# Patient Record
Sex: FEMALE | Race: BLACK OR AFRICAN AMERICAN | NOT HISPANIC OR LATINO | URBAN - METROPOLITAN AREA
[De-identification: names, ages, dates, MRNs, and addresses within clinical notes are randomized per-mention and may not be internally consistent; named-entity substitution may affect disease eponyms.]

---

## 2021-08-02 ENCOUNTER — LAB VISIT (OUTPATIENT)
Dept: PRIMARY CARE CLINIC | Facility: CLINIC | Age: 66
End: 2021-08-02
Payer: OTHER GOVERNMENT

## 2021-08-02 DIAGNOSIS — R11.0 NAUSEA: ICD-10-CM

## 2021-08-02 DIAGNOSIS — R05.9 COUGH: ICD-10-CM

## 2021-08-02 PROCEDURE — U0003 INFECTIOUS AGENT DETECTION BY NUCLEIC ACID (DNA OR RNA); SEVERE ACUTE RESPIRATORY SYNDROME CORONAVIRUS 2 (SARS-COV-2) (CORONAVIRUS DISEASE [COVID-19]), AMPLIFIED PROBE TECHNIQUE, MAKING USE OF HIGH THROUGHPUT TECHNOLOGIES AS DESCRIBED BY CMS-2020-01-R: HCPCS | Performed by: INTERNAL MEDICINE

## 2021-08-02 PROCEDURE — U0005 INFEC AGEN DETEC AMPLI PROBE: HCPCS | Performed by: INTERNAL MEDICINE

## 2021-08-03 LAB
SARS-COV-2 RNA RESP QL NAA+PROBE: NOT DETECTED
SARS-COV-2- CYCLE NUMBER: -1

## 2025-07-02 ENCOUNTER — HOSPITAL ENCOUNTER (INPATIENT)
Facility: HOSPITAL | Age: 70
LOS: 7 days | Discharge: NURSING, REHAB OR LONG-TERM CARE | DRG: 871 | End: 2025-07-09
Attending: EMERGENCY MEDICINE | Admitting: STUDENT IN AN ORGANIZED HEALTH CARE EDUCATION/TRAINING PROGRAM
Payer: MEDICARE

## 2025-07-02 DIAGNOSIS — R07.9 CHEST PAIN: ICD-10-CM

## 2025-07-02 DIAGNOSIS — N30.90 CYSTITIS: ICD-10-CM

## 2025-07-02 DIAGNOSIS — A41.9 SEPTIC SHOCK: ICD-10-CM

## 2025-07-02 DIAGNOSIS — I50.9 HEART FAILURE: ICD-10-CM

## 2025-07-02 DIAGNOSIS — E87.20 LACTIC ACIDOSIS: ICD-10-CM

## 2025-07-02 DIAGNOSIS — R06.02 SHORTNESS OF BREATH: ICD-10-CM

## 2025-07-02 DIAGNOSIS — R41.82 ALTERED MENTAL STATUS, UNSPECIFIED ALTERED MENTAL STATUS TYPE: ICD-10-CM

## 2025-07-02 DIAGNOSIS — A41.9 SEVERE SEPSIS: Primary | ICD-10-CM

## 2025-07-02 DIAGNOSIS — R65.21 SEPTIC SHOCK: ICD-10-CM

## 2025-07-02 DIAGNOSIS — R00.0 SINUS TACHYCARDIA: ICD-10-CM

## 2025-07-02 DIAGNOSIS — T14.8XXA OPEN WOUND OF SKIN: ICD-10-CM

## 2025-07-02 DIAGNOSIS — R65.20 SEVERE SEPSIS: Primary | ICD-10-CM

## 2025-07-02 PROBLEM — Z93.1 PEG (PERCUTANEOUS ENDOSCOPIC GASTROSTOMY) STATUS: Status: ACTIVE | Noted: 2025-07-02

## 2025-07-02 PROBLEM — I50.42 CHRONIC COMBINED SYSTOLIC AND DIASTOLIC CHF (CONGESTIVE HEART FAILURE): Status: ACTIVE | Noted: 2025-07-02

## 2025-07-02 PROBLEM — Z86.73 HISTORY OF CVA (CEREBROVASCULAR ACCIDENT): Status: ACTIVE | Noted: 2025-07-02

## 2025-07-02 PROBLEM — I10 HYPERTENSION: Status: ACTIVE | Noted: 2025-04-29

## 2025-07-02 PROBLEM — Z86.711 HISTORY OF PULMONARY EMBOLISM: Status: ACTIVE | Noted: 2025-07-02

## 2025-07-02 PROBLEM — Z86.19 HISTORY OF CLOSTRIDIOIDES DIFFICILE COLITIS: Status: ACTIVE | Noted: 2025-07-02

## 2025-07-02 PROBLEM — R56.9 SEIZURES: Status: ACTIVE | Noted: 2025-07-02

## 2025-07-02 PROBLEM — R47.01 GLOBAL APHASIA: Status: ACTIVE | Noted: 2025-04-29

## 2025-07-02 PROBLEM — I69.351 HEMIPARESIS OF RIGHT DOMINANT SIDE AS LATE EFFECT OF CEREBRAL INFARCTION: Status: ACTIVE | Noted: 2025-04-29

## 2025-07-02 PROBLEM — Z71.89 ADVANCED CARE PLANNING/COUNSELING DISCUSSION: Status: ACTIVE | Noted: 2025-07-02

## 2025-07-02 LAB
ABSOLUTE EOSINOPHIL (OHS): 0.09 K/UL
ABSOLUTE MONOCYTE (OHS): 2.09 K/UL (ref 0.3–1)
ABSOLUTE NEUTROPHIL COUNT (OHS): 15.32 K/UL (ref 1.8–7.7)
ALBUMIN SERPL BCP-MCNC: 2.3 G/DL (ref 3.5–5.2)
ALLENS TEST: ABNORMAL
ALLENS TEST: NORMAL
ALP SERPL-CCNC: 100 UNIT/L (ref 40–150)
ALT SERPL W/O P-5'-P-CCNC: 35 UNIT/L (ref 10–44)
ANION GAP (OHS): 17 MMOL/L (ref 8–16)
AST SERPL-CCNC: 137 UNIT/L (ref 11–45)
BACTERIA #/AREA URNS AUTO: ABNORMAL /HPF
BASOPHILS # BLD AUTO: 0.15 K/UL
BASOPHILS NFR BLD AUTO: 0.7 %
BILIRUB SERPL-MCNC: 0.3 MG/DL (ref 0.1–1)
BILIRUB UR QL STRIP.AUTO: NEGATIVE
BUN SERPL-MCNC: 40 MG/DL (ref 8–23)
CALCIUM SERPL-MCNC: 11 MG/DL (ref 8.7–10.5)
CHLORIDE SERPL-SCNC: 97 MMOL/L (ref 95–110)
CLARITY UR: ABNORMAL
CO2 SERPL-SCNC: 25 MMOL/L (ref 23–29)
COLOR UR AUTO: YELLOW
CREAT SERPL-MCNC: 1.2 MG/DL (ref 0.5–1.4)
ERYTHROCYTE [DISTWIDTH] IN BLOOD BY AUTOMATED COUNT: 15.2 % (ref 11.5–14.5)
GFR SERPLBLD CREATININE-BSD FMLA CKD-EPI: 49 ML/MIN/1.73/M2
GLUCOSE SERPL-MCNC: 137 MG/DL (ref 70–110)
GLUCOSE UR QL STRIP: ABNORMAL
GRAN CASTS UR QL COMP ASSIST: 18 /LPF (ref ?–0)
HCT VFR BLD AUTO: 33.4 % (ref 37–48.5)
HGB BLD-MCNC: 10.6 GM/DL (ref 12–16)
HGB UR QL STRIP: ABNORMAL
HYALINE CASTS UR QL AUTO: 27 /LPF (ref 0–1)
IMM GRANULOCYTES # BLD AUTO: 0.21 K/UL (ref 0–0.04)
IMM GRANULOCYTES NFR BLD AUTO: 0.9 % (ref 0–0.5)
INFLUENZA A MOLECULAR (OHS): NEGATIVE
INFLUENZA B MOLECULAR (OHS): NEGATIVE
KETONES UR QL STRIP: NEGATIVE
LACTATE SERPL-SCNC: 2.4 MMOL/L (ref 0.5–2.2)
LACTATE SERPL-SCNC: 3.5 MMOL/L (ref 0.5–2.2)
LDH SERPL L TO P-CCNC: 4.08 MMOL/L (ref 0.5–2.2)
LDH SERPL L TO P-CCNC: NORMAL MMOL/L
LEUKOCYTE ESTERASE UR QL STRIP: ABNORMAL
LYMPHOCYTES # BLD AUTO: 4.73 K/UL (ref 1–4.8)
MCH RBC QN AUTO: 32.6 PG (ref 27–31)
MCHC RBC AUTO-ENTMCNC: 31.7 G/DL (ref 32–36)
MCV RBC AUTO: 103 FL (ref 82–98)
MICROSCOPIC COMMENT: ABNORMAL
NITRITE UR QL STRIP: NEGATIVE
NON-SQ EPI CELLS #/AREA URNS AUTO: 3 /HPF
NUCLEATED RBC (/100WBC) (OHS): 0 /100 WBC
PH UR STRIP: 6 [PH]
PLATELET # BLD AUTO: 571 K/UL (ref 150–450)
PMV BLD AUTO: 10.4 FL (ref 9.2–12.9)
POTASSIUM SERPL-SCNC: 4.8 MMOL/L (ref 3.5–5.1)
PROT SERPL-MCNC: 9.2 GM/DL (ref 6–8.4)
PROT UR QL STRIP: ABNORMAL
RBC # BLD AUTO: 3.25 M/UL (ref 4–5.4)
RBC #/AREA URNS AUTO: >100 /HPF (ref 0–4)
RELATIVE EOSINOPHIL (OHS): 0.4 %
RELATIVE LYMPHOCYTE (OHS): 20.9 % (ref 18–48)
RELATIVE MONOCYTE (OHS): 9.3 % (ref 4–15)
RELATIVE NEUTROPHIL (OHS): 67.8 % (ref 38–73)
SAMPLE: ABNORMAL
SAMPLE: NORMAL
SARS-COV-2 RDRP RESP QL NAA+PROBE: NEGATIVE
SITE: ABNORMAL
SITE: NORMAL
SODIUM SERPL-SCNC: 139 MMOL/L (ref 136–145)
SP GR UR STRIP: 1.02
SQUAMOUS #/AREA URNS AUTO: 2 /HPF
UROBILINOGEN UR STRIP-ACNC: NEGATIVE EU/DL
WBC # BLD AUTO: 22.59 K/UL (ref 3.9–12.7)
WBC #/AREA URNS AUTO: >100 /HPF (ref 0–5)
WBC CLUMPS UR QL AUTO: ABNORMAL
YEAST UR QL AUTO: ABNORMAL /HPF

## 2025-07-02 PROCEDURE — 96367 TX/PROPH/DG ADDL SEQ IV INF: CPT

## 2025-07-02 PROCEDURE — 25000003 PHARM REV CODE 250: Performed by: EMERGENCY MEDICINE

## 2025-07-02 PROCEDURE — 0202U NFCT DS 22 TRGT SARS-COV-2: CPT | Performed by: STUDENT IN AN ORGANIZED HEALTH CARE EDUCATION/TRAINING PROGRAM

## 2025-07-02 PROCEDURE — 96360 HYDRATION IV INFUSION INIT: CPT | Mod: 59

## 2025-07-02 PROCEDURE — 94640 AIRWAY INHALATION TREATMENT: CPT

## 2025-07-02 PROCEDURE — U0002 COVID-19 LAB TEST NON-CDC: HCPCS | Performed by: STUDENT IN AN ORGANIZED HEALTH CARE EDUCATION/TRAINING PROGRAM

## 2025-07-02 PROCEDURE — 25000242 PHARM REV CODE 250 ALT 637 W/ HCPCS: Performed by: EMERGENCY MEDICINE

## 2025-07-02 PROCEDURE — 25500020 PHARM REV CODE 255: Performed by: STUDENT IN AN ORGANIZED HEALTH CARE EDUCATION/TRAINING PROGRAM

## 2025-07-02 PROCEDURE — 93005 ELECTROCARDIOGRAM TRACING: CPT

## 2025-07-02 PROCEDURE — 99900035 HC TECH TIME PER 15 MIN (STAT)

## 2025-07-02 PROCEDURE — 83605 ASSAY OF LACTIC ACID: CPT

## 2025-07-02 PROCEDURE — 87449 NOS EACH ORGANISM AG IA: CPT | Performed by: PHYSICIAN ASSISTANT

## 2025-07-02 PROCEDURE — 27000207 HC ISOLATION

## 2025-07-02 PROCEDURE — 20000000 HC ICU ROOM

## 2025-07-02 PROCEDURE — 87502 INFLUENZA DNA AMP PROBE: CPT | Performed by: STUDENT IN AN ORGANIZED HEALTH CARE EDUCATION/TRAINING PROGRAM

## 2025-07-02 PROCEDURE — 99291 CRITICAL CARE FIRST HOUR: CPT

## 2025-07-02 PROCEDURE — 63600175 PHARM REV CODE 636 W HCPCS: Performed by: PHYSICIAN ASSISTANT

## 2025-07-02 PROCEDURE — 81001 URINALYSIS AUTO W/SCOPE: CPT | Performed by: EMERGENCY MEDICINE

## 2025-07-02 PROCEDURE — 25000003 PHARM REV CODE 250: Performed by: INTERNAL MEDICINE

## 2025-07-02 PROCEDURE — 94760 N-INVAS EAR/PLS OXIMETRY 1: CPT | Mod: XB

## 2025-07-02 PROCEDURE — 25000003 PHARM REV CODE 250: Performed by: STUDENT IN AN ORGANIZED HEALTH CARE EDUCATION/TRAINING PROGRAM

## 2025-07-02 PROCEDURE — 80053 COMPREHEN METABOLIC PANEL: CPT | Performed by: EMERGENCY MEDICINE

## 2025-07-02 PROCEDURE — 93010 ELECTROCARDIOGRAM REPORT: CPT | Mod: ,,, | Performed by: INTERNAL MEDICINE

## 2025-07-02 PROCEDURE — 83605 ASSAY OF LACTIC ACID: CPT | Performed by: EMERGENCY MEDICINE

## 2025-07-02 PROCEDURE — 36600 WITHDRAWAL OF ARTERIAL BLOOD: CPT

## 2025-07-02 PROCEDURE — 85025 COMPLETE CBC W/AUTO DIFF WBC: CPT | Performed by: EMERGENCY MEDICINE

## 2025-07-02 PROCEDURE — 63600175 PHARM REV CODE 636 W HCPCS: Performed by: EMERGENCY MEDICINE

## 2025-07-02 PROCEDURE — 96365 THER/PROPH/DIAG IV INF INIT: CPT

## 2025-07-02 PROCEDURE — 87040 BLOOD CULTURE FOR BACTERIA: CPT | Performed by: EMERGENCY MEDICINE

## 2025-07-02 PROCEDURE — 82803 BLOOD GASES ANY COMBINATION: CPT

## 2025-07-02 PROCEDURE — 87086 URINE CULTURE/COLONY COUNT: CPT | Performed by: EMERGENCY MEDICINE

## 2025-07-02 RX ORDER — VANCOMYCIN HYDROCHLORIDE 25 MG/ML
5 KIT ORAL 4 TIMES DAILY
Status: ON HOLD | COMMUNITY
Start: 2025-06-14 | End: 2025-07-08 | Stop reason: HOSPADM

## 2025-07-02 RX ORDER — IPRATROPIUM BROMIDE AND ALBUTEROL SULFATE 2.5; .5 MG/3ML; MG/3ML
3 SOLUTION RESPIRATORY (INHALATION)
Status: COMPLETED | OUTPATIENT
Start: 2025-07-02 | End: 2025-07-02

## 2025-07-02 RX ORDER — LANOLIN ALCOHOL/MO/W.PET/CERES
1000 CREAM (GRAM) TOPICAL
COMMUNITY
Start: 2025-05-21 | End: 2026-05-21

## 2025-07-02 RX ORDER — SODIUM CHLORIDE 0.9 % (FLUSH) 0.9 %
10 SYRINGE (ML) INJECTION
Status: DISCONTINUED | OUTPATIENT
Start: 2025-07-02 | End: 2025-07-09 | Stop reason: HOSPADM

## 2025-07-02 RX ORDER — FLUOXETINE 20 MG/5ML
40 SOLUTION ORAL
COMMUNITY
Start: 2025-05-21 | End: 2026-05-21

## 2025-07-02 RX ORDER — METOPROLOL TARTRATE 25 MG/1
50 TABLET, FILM COATED ORAL 2 TIMES DAILY
Status: ON HOLD | COMMUNITY
Start: 2025-06-14 | End: 2025-07-09

## 2025-07-02 RX ORDER — IPRATROPIUM BROMIDE 0.5 MG/2.5ML
0.5 SOLUTION RESPIRATORY (INHALATION) 4 TIMES DAILY
Status: ON HOLD | COMMUNITY
Start: 2025-05-20 | End: 2025-07-08 | Stop reason: HOSPADM

## 2025-07-02 RX ORDER — ENOXAPARIN SODIUM 100 MG/ML
1 INJECTION SUBCUTANEOUS EVERY 24 HOURS
Status: DISCONTINUED | OUTPATIENT
Start: 2025-07-02 | End: 2025-07-05

## 2025-07-02 RX ORDER — FUROSEMIDE 10 MG/ML
20 INJECTION INTRAMUSCULAR; INTRAVENOUS ONCE
Status: COMPLETED | OUTPATIENT
Start: 2025-07-02 | End: 2025-07-02

## 2025-07-02 RX ORDER — LEVETIRACETAM 500 MG/5ML
500 INJECTION, SOLUTION, CONCENTRATE INTRAVENOUS EVERY 12 HOURS
Status: DISCONTINUED | OUTPATIENT
Start: 2025-07-02 | End: 2025-07-09 | Stop reason: HOSPADM

## 2025-07-02 RX ORDER — IPRATROPIUM BROMIDE AND ALBUTEROL SULFATE 2.5; .5 MG/3ML; MG/3ML
3 SOLUTION RESPIRATORY (INHALATION) EVERY 4 HOURS PRN
Status: DISCONTINUED | OUTPATIENT
Start: 2025-07-02 | End: 2025-07-09 | Stop reason: HOSPADM

## 2025-07-02 RX ORDER — ACETAMINOPHEN 500 MG
1000 TABLET ORAL ONCE
Status: COMPLETED | OUTPATIENT
Start: 2025-07-02 | End: 2025-07-02

## 2025-07-02 RX ORDER — METRONIDAZOLE 500 MG/1
500 TABLET ORAL EVERY 8 HOURS
Status: DISCONTINUED | OUTPATIENT
Start: 2025-07-02 | End: 2025-07-03

## 2025-07-02 RX ORDER — ACETAMINOPHEN 500 MG
1000 TABLET ORAL ONCE
Status: DISCONTINUED | OUTPATIENT
Start: 2025-07-02 | End: 2025-07-02

## 2025-07-02 RX ORDER — CEFEPIME HYDROCHLORIDE 1 G/1
1 INJECTION, POWDER, FOR SOLUTION INTRAMUSCULAR; INTRAVENOUS
Status: DISCONTINUED | OUTPATIENT
Start: 2025-07-02 | End: 2025-07-03

## 2025-07-02 RX ORDER — SODIUM CHLORIDE 0.9 % (FLUSH) 0.9 %
10 SYRINGE (ML) INJECTION EVERY 8 HOURS
Status: DISCONTINUED | OUTPATIENT
Start: 2025-07-02 | End: 2025-07-02

## 2025-07-02 RX ORDER — TALC
6 POWDER (GRAM) TOPICAL NIGHTLY PRN
Status: DISCONTINUED | OUTPATIENT
Start: 2025-07-02 | End: 2025-07-02

## 2025-07-02 RX ORDER — LEVETIRACETAM 500 MG/1
500 TABLET ORAL 2 TIMES DAILY
COMMUNITY
Start: 2025-05-20 | End: 2026-05-20

## 2025-07-02 RX ORDER — TRAMADOL HYDROCHLORIDE 50 MG/1
50 TABLET, FILM COATED ORAL EVERY 6 HOURS PRN
Status: ON HOLD | COMMUNITY
Start: 2025-06-05 | End: 2025-07-08 | Stop reason: HOSPADM

## 2025-07-02 RX ORDER — METRONIDAZOLE 500 MG/1
500 TABLET ORAL EVERY 8 HOURS
Status: DISCONTINUED | OUTPATIENT
Start: 2025-07-02 | End: 2025-07-02

## 2025-07-02 RX ORDER — NALOXONE HCL 0.4 MG/ML
0.02 VIAL (ML) INJECTION
Status: DISCONTINUED | OUTPATIENT
Start: 2025-07-02 | End: 2025-07-09 | Stop reason: HOSPADM

## 2025-07-02 RX ADMIN — LEVETIRACETAM 500 MG: 100 INJECTION INTRAVENOUS at 09:07

## 2025-07-02 RX ADMIN — SODIUM CHLORIDE, POTASSIUM CHLORIDE, SODIUM LACTATE AND CALCIUM CHLORIDE 1170 ML: 600; 310; 30; 20 INJECTION, SOLUTION INTRAVENOUS at 09:07

## 2025-07-02 RX ADMIN — SODIUM CHLORIDE 1000 ML: 9 INJECTION, SOLUTION INTRAVENOUS at 12:07

## 2025-07-02 RX ADMIN — CEFEPIME 1 G: 1 INJECTION, POWDER, FOR SOLUTION INTRAMUSCULAR; INTRAVENOUS at 03:07

## 2025-07-02 RX ADMIN — IPRATROPIUM BROMIDE AND ALBUTEROL SULFATE 3 ML: 2.5; .5 SOLUTION RESPIRATORY (INHALATION) at 01:07

## 2025-07-02 RX ADMIN — ENOXAPARIN SODIUM 40 MG: 40 INJECTION SUBCUTANEOUS at 09:07

## 2025-07-02 RX ADMIN — METRONIDAZOLE 500 MG: 500 TABLET ORAL at 09:07

## 2025-07-02 RX ADMIN — FUROSEMIDE 20 MG: 10 INJECTION, SOLUTION INTRAMUSCULAR; INTRAVENOUS at 07:07

## 2025-07-02 RX ADMIN — PIPERACILLIN SODIUM AND TAZOBACTAM SODIUM 4.5 G: 4; .5 INJECTION, POWDER, FOR SOLUTION INTRAVENOUS at 09:07

## 2025-07-02 RX ADMIN — VANCOMYCIN HYDROCHLORIDE 750 MG: 750 INJECTION, POWDER, LYOPHILIZED, FOR SOLUTION INTRAVENOUS at 09:07

## 2025-07-02 RX ADMIN — IOHEXOL 70 ML: 350 INJECTION, SOLUTION INTRAVENOUS at 02:07

## 2025-07-02 RX ADMIN — ACETAMINOPHEN 1000 MG: 500 TABLET ORAL at 12:07

## 2025-07-02 NOTE — PLAN OF CARE
Case Management Assessment     PCP: Rut Rosenthal MD    Pharmacy: Nursing Home Orders    Patient Arrived From: [facility] ([facility])  Existing Help at Home: Currently at a nursing facility    Barriers to Discharge: none    Discharge Plan:    A. SNF   B. NH      Discharge planning assessment completed with assistance from patient's daughter, Ida Mancilla, via telephone.  When medically stable, patient will return to SNF.             07/02/25 1229   Discharge Assessment   Assessment Type Discharge Planning Assessment   Confirmed/corrected address, phone number and insurance Yes   Confirmed Demographics Correct on Facesheet   Source of Information health record   If unable to respond/provide information was family/caregiver contacted? Yes   Contact Name/Number Ida Mancilla, daughter,  [phone]   People in Home facility resident  (SNF (per daughter))   Name(s) of People in Home n/a   Facility Arrived From: [facility]. ([facility])   Do you expect to return to your current living situation? Yes   Do you have help at home or someone to help you manage your care at home? Yes   Who are your caregiver(s) and their phone number(s)? Nursing home staff  [phone]   Prior to hospitilization cognitive status: Unable to Assess   Current cognitive status: Unable to Assess  (Patient lying on strecher moaning.  Was not able to assist with discharge planning.)   Walking or Climbing Stairs Difficulty yes   Walking or Climbing Stairs ambulation difficulty, requires equipment   Dressing/Bathing Difficulty yes   Dressing/Bathing bathing difficulty, assistance 1 person   Home Accessibility wheelchair accessible  (NH)   Equipment Currently Used at Home wheelchair   Readmission within 30 days? No   Patient currently being followed by outpatient case management? No   Do you currently have service(s) that help you manage your care at home? No   Do you take prescription medications? Yes   Do you have prescription coverage?    (Unknown)   Who is going to help you get home at discharge? Ambulance or wheelchair van (depending on patient's postural status at discharge).   How do you get to doctors appointments? other (see comments)   Are you on dialysis? No   Do you take coumadin? No   Discharge Plan A Skilled Nursing Facility   Discharge Plan B Return to Nursing Home   DME Needed Upon Discharge  none   Discharge Plan discussed with: Adult children   Transition of Care Barriers None

## 2025-07-02 NOTE — ASSESSMENT & PLAN NOTE
"Patient has Combined Systolic and Diastolic heart failure that is Chronic. On presentation their CHF was well compensated. Most recent BNP and echo results are listed below.  No results for input(s): "BNP" in the last 72 hours.  Latest ECHO  No results found for this or any previous visit.    Current Heart Failure Medications  , Daily, Per G Tube  , 2 times daily, FEEDING TUBE    Plan  - Monitor strict I&Os and daily weights.    - Place on telemetry  - Low sodium diet  - Place on fluid restriction of 1.5 L.   - Cardiology has not been consulted  - The patient's volume status is at their baseline  - home metoprolol and entresto held for sepsis.     Echo in care everywhere with EF of 35 to 40% and grade 1 DD.           "

## 2025-07-02 NOTE — SUBJECTIVE & OBJECTIVE
Past Medical History:   Diagnosis Date    Anticoagulant long-term use     CHF (congestive heart failure)     Seizures     Stroke        No past surgical history on file.    Review of patient's allergies indicates:  No Known Allergies    No current facility-administered medications on file prior to encounter.     Current Outpatient Medications on File Prior to Encounter   Medication Sig    apixaban (ELIQUIS) 5 mg Tab 5 mg by Per G Tube route 2 (two) times daily.    cyanocobalamin (VITAMIN B-12) 1000 MCG tablet 1,000 mcg by FEEDING TUBE route.    empagliflozin (JARDIANCE) 10 mg tablet 10 mg by Per G Tube route once daily.    FLUoxetine (PROZAC) 20 mg/5 mL (4 mg/mL) solution 40 mg by FEEDING TUBE route.    ipratropium (ATROVENT) 0.02 % nebulizer solution Inhale 0.5 mg into the lungs 4 (four) times daily.    levETIRAcetam (KEPPRA) 500 MG Tab 500 mg by FEEDING TUBE route 2 (two) times daily.    metoprolol tartrate (LOPRESSOR) 25 MG tablet 50 mg by FEEDING TUBE route 2 (two) times daily.    sacubitriL-valsartan (ENTRESTO) 24-26 mg per tablet 1 tablet by FEEDING TUBE route 2 (two) times daily.    traMADoL (ULTRAM) 50 mg tablet Take 50 mg by mouth every 6 (six) hours as needed.    vancomycin (FIRVANQ) 25 mg/mL SolR 5 mLs by Per G Tube route 4 (four) times daily.     Family History    None       Tobacco Use    Smoking status: Not on file    Smokeless tobacco: Not on file   Substance and Sexual Activity    Alcohol use: Not on file    Drug use: Not on file    Sexual activity: Not on file     Review of Systems   Unable to perform ROS: Patient nonverbal     Objective:     Vital Signs (Most Recent):  Temp: 100 °F (37.8 °C) (07/02/25 1258)  Pulse: (!) 137 (07/02/25 1400)  Resp: (!) 24 (07/02/25 1320)  BP: (!) 116/53 (07/02/25 1400)  SpO2: 96 % (07/02/25 1400) Vital Signs (24h Range):  Temp:  [98.6 °F (37 °C)-100 °F (37.8 °C)] 100 °F (37.8 °C)  Pulse:  [100-142] 137  Resp:  [24-75] 24  SpO2:  [96 %-100 %] 96 %  BP: ()/(40-70)  116/53     Weight: 39 kg (85 lb 15.7 oz)  Body mass index is 16.79 kg/m².     Physical Exam  Vitals and nursing note reviewed.   Constitutional:       General: She is not in acute distress.     Appearance: She is well-developed.   HENT:      Head: Normocephalic and atraumatic.   Eyes:      General:         Right eye: No discharge.         Left eye: No discharge.      Conjunctiva/sclera: Conjunctivae normal.   Neck:      Thyroid: No thyromegaly.   Cardiovascular:      Rate and Rhythm: Normal rate and regular rhythm.      Heart sounds: No murmur heard.  Pulmonary:      Effort: Pulmonary effort is normal. No respiratory distress.      Breath sounds: Normal breath sounds.      Comments: On nasal cannula  Abdominal:      General: Bowel sounds are normal. There is no distension.      Palpations: Abdomen is soft. There is no mass.      Tenderness: There is no abdominal tenderness.   Musculoskeletal:         General: No deformity.      Cervical back: Normal range of motion.   Skin:     General: Skin is warm and dry.   Neurological:      Mental Status: She is alert.      Comments: Right sided hemiparesis. Non-verbal. Does not track. Spontaneously moves fingers of left hand.    Psychiatric:      Comments: Unable to assess                Significant Labs: CBC:   Recent Labs   Lab 07/02/25  0852   WBC 22.59*   HGB 10.6*   HCT 33.4*   *     CMP:   Recent Labs   Lab 07/02/25  0852      K 4.8   CL 97   CO2 25   *   BUN 40*   CREATININE 1.2   CALCIUM 11.0*   PROT 9.2*   ALBUMIN 2.3*   BILITOT 0.3   ALKPHOS 100   *   ALT 35   ANIONGAP 17*     Lactic Acid:   Recent Labs   Lab 07/02/25  0852 07/02/25  1133   LACTATE 3.5* 2.4*     Urine Studies:   Recent Labs   Lab 07/02/25  1044   COLORU Yellow   APPEARANCEUA Cloudy*   PHUR 6.0   SPECGRAV 1.025   PROTEINUA 1+*   GLUCUA 3+*   BILIRUBINUA Negative   OCCULTUA 3+*   NITRITE Negative   UROBILINOGEN Negative   LEUKOCYTESUR 3+*   RBCUA >100*   WBCUA >100*    BACTERIA Moderate*   HYALINECASTS 27*       Significant Imaging:   Imaging Results              X-Ray Chest 1 View (Final result)  Result time 07/02/25 13:47:16      Final result by Jose Alston MD (07/02/25 13:47:16)                   Impression:      As above      Electronically signed by: Jose Alsotn MD  Date:    07/02/2025  Time:    13:47               Narrative:    EXAMINATION:  XR CHEST 1 VIEW    CLINICAL HISTORY:  Shortness of breath    TECHNIQUE:  Single frontal view of the chest was performed.    COMPARISON:  07/02/2025    FINDINGS:  There is no pneumothorax.  Coarse interstitial attenuation is more conspicuous than on the previous exam, developing edema or infection is a consideration.  There is no pneumothorax or other significant detrimental change since the previous exam.                                       X-Ray Chest AP Portable (Final result)  Result time 07/02/25 11:03:14      Final result by Rakesh Mistry MD (07/02/25 11:03:14)                   Impression:      No acute radiographic findings in the chest on this single view.      Electronically signed by: Rakesh Mistry MD  Date:    07/02/2025  Time:    11:03               Narrative:    EXAMINATION:  XR CHEST AP PORTABLE    CLINICAL HISTORY:  Sepsis;    TECHNIQUE:  Single frontal view of the chest was performed.    COMPARISON:  None.    FINDINGS:  Cardiac monitoring leads project over the bilateral hemithoraces.  Mediastinal structures are midline. Hilar contours are unremarkable.  Cardiac silhouette is normal in size. Lung volumes are normal and symmetric.  No consolidation.  No pneumothorax or pleural effusion.  No free air beneath the diaphragm. No acute osseous abnormalities.  Degenerative changes of the spine and shoulders.

## 2025-07-02 NOTE — H&P
[facility]  [facility] Medicine  History & Physical    Patient Name: Lynda Mackenzie  MRN: [id number]  Patient Class: IP- Inpatient  Admission Date: 7/2/2025  Attending Physician: Tanner Colmenares MD   Primary Care Provider: Rut Rosenthal MD         Patient information was obtained from patient, past medical records, and ER records.     Subjective:     Principal Problem:Severe sepsis    Chief Complaint:   Chief Complaint   Patient presents with    Shortness of Breath     Pt from [facility] [place] with reports SOB starting this morning. Patient on NRB upon arrival. Sats were initially 96% but was tachypneic and dropped to 80% for EMS. Denies hx COPD. + HX PE. Pt with mild increased WOB at arrival.        HPI: Lynda Mackenzie 69 y.o. female with CHF, history of PE on eliquis, seizure disorder on keppra, previous CVA with residual aphasia and right sided hemiparesis, PEG status presents to the hospital from [facility] after witnessed hypoxia and increased work of breathing. The patient is unable to provide any history due to baseline aphasia.     Discussed with nurse from [facility] she reports this morning patient's respiratory rate was elevated near 30 and she was hypoxic requiring supplemental oxygen causing activation of EMS.  At baseline she is not on supplemental oxygen.  She is nonverbal at baseline.  She has been tolerating her tube feeds at 40 cc an hour continuous with 140cc every 4 hours FWF.  She completed her vancomycin from previous hospitalization for C diff colitis.  There has been no diarrhea at the nursing home.  There have been no witnessed episodes of emesis or seizures.  She has not had a fever.  She receives her Keppra and Eliquis via PEG.    Discussed with patient's daughter KIRK he will confirms DNR status.  She has baseline aphasia and right-sided hemiparesis from previous CVA. She is not on home oxygen.     In the ED, tachycardic near 140 white blood cell count 22 initial lactic  acid 3.5 repeat 2.4 UA with white blood cells bacteria initial chest x-ray without acute process, repeat after 1.7 L of IVF with increased conspicuous lung markings.        Past Medical History:   Diagnosis Date    Anticoagulant long-term use     CHF (congestive heart failure)     Seizures     Stroke        No past surgical history on file.    Review of patient's allergies indicates:  No Known Allergies    No current facility-administered medications on file prior to encounter.     Current Outpatient Medications on File Prior to Encounter   Medication Sig    apixaban (ELIQUIS) 5 mg Tab 5 mg by Per G Tube route 2 (two) times daily.    cyanocobalamin (VITAMIN B-12) 1000 MCG tablet 1,000 mcg by FEEDING TUBE route.    empagliflozin (JARDIANCE) 10 mg tablet 10 mg by Per G Tube route once daily.    FLUoxetine (PROZAC) 20 mg/5 mL (4 mg/mL) solution 40 mg by FEEDING TUBE route.    ipratropium (ATROVENT) 0.02 % nebulizer solution Inhale 0.5 mg into the lungs 4 (four) times daily.    levETIRAcetam (KEPPRA) 500 MG Tab 500 mg by FEEDING TUBE route 2 (two) times daily.    metoprolol tartrate (LOPRESSOR) 25 MG tablet 50 mg by FEEDING TUBE route 2 (two) times daily.    sacubitriL-valsartan (ENTRESTO) 24-26 mg per tablet 1 tablet by FEEDING TUBE route 2 (two) times daily.    traMADoL (ULTRAM) 50 mg tablet Take 50 mg by mouth every 6 (six) hours as needed.    vancomycin (FIRVANQ) 25 mg/mL SolR 5 mLs by Per G Tube route 4 (four) times daily.     Family History    None       Tobacco Use    Smoking status: Not on file    Smokeless tobacco: Not on file   Substance and Sexual Activity    Alcohol use: Not on file    Drug use: Not on file    Sexual activity: Not on file     Review of Systems   Unable to perform ROS: Patient nonverbal     Objective:     Vital Signs (Most Recent):  Temp: 100 °F (37.8 °C) (07/02/25 1258)  Pulse: (!) 137 (07/02/25 1400)  Resp: (!) 24 (07/02/25 1320)  BP: (!) 116/53 (07/02/25 1400)  SpO2: 96 % (07/02/25 1400)  Vital Signs (24h Range):  Temp:  [98.6 °F (37 °C)-100 °F (37.8 °C)] 100 °F (37.8 °C)  Pulse:  [100-142] 137  Resp:  [24-75] 24  SpO2:  [96 %-100 %] 96 %  BP: ()/(40-70) 116/53     Weight: 39 kg (85 lb 15.7 oz)  Body mass index is 16.79 kg/m².     Physical Exam  Vitals and nursing note reviewed.   Constitutional:       General: She is not in acute distress.     Appearance: She is well-developed.   HENT:      Head: Normocephalic and atraumatic.   Eyes:      General:         Right eye: No discharge.         Left eye: No discharge.      Conjunctiva/sclera: Conjunctivae normal.   Neck:      Thyroid: No thyromegaly.   Cardiovascular:      Rate and Rhythm: Normal rate and regular rhythm.      Heart sounds: No murmur heard.  Pulmonary:      Effort: Pulmonary effort is normal. No respiratory distress.      Breath sounds: Normal breath sounds.      Comments: On nasal cannula  Abdominal:      General: Bowel sounds are normal. There is no distension.      Palpations: Abdomen is soft. There is no mass.      Tenderness: There is no abdominal tenderness.   Musculoskeletal:         General: No deformity.      Cervical back: Normal range of motion.   Skin:     General: Skin is warm and dry.   Neurological:      Mental Status: She is alert.      Comments: Right sided hemiparesis. Non-verbal. Does not track. Spontaneously moves fingers of left hand.    Psychiatric:      Comments: Unable to assess                Significant Labs: CBC:   Recent Labs   Lab 07/02/25  0852   WBC 22.59*   HGB 10.6*   HCT 33.4*   *     CMP:   Recent Labs   Lab 07/02/25  0852      K 4.8   CL 97   CO2 25   *   BUN 40*   CREATININE 1.2   CALCIUM 11.0*   PROT 9.2*   ALBUMIN 2.3*   BILITOT 0.3   ALKPHOS 100   *   ALT 35   ANIONGAP 17*     Lactic Acid:   Recent Labs   Lab 07/02/25  0852 07/02/25  1133   LACTATE 3.5* 2.4*     Urine Studies:   Recent Labs   Lab 07/02/25  1044   COLORU Yellow   APPEARANCEUA Cloudy*   PHUR 6.0    SPECGRAV 1.025   PROTEINUA 1+*   GLUCUA 3+*   BILIRUBINUA Negative   OCCULTUA 3+*   NITRITE Negative   UROBILINOGEN Negative   LEUKOCYTESUR 3+*   RBCUA >100*   WBCUA >100*   BACTERIA Moderate*   HYALINECASTS 27*       Significant Imaging:   Imaging Results              X-Ray Chest 1 View (Final result)  Result time 07/02/25 13:47:16      Final result by Jose Alston MD (07/02/25 13:47:16)                   Impression:      As above      Electronically signed by: Jose Alston MD  Date:    07/02/2025  Time:    13:47               Narrative:    EXAMINATION:  XR CHEST 1 VIEW    CLINICAL HISTORY:  Shortness of breath    TECHNIQUE:  Single frontal view of the chest was performed.    COMPARISON:  07/02/2025    FINDINGS:  There is no pneumothorax.  Coarse interstitial attenuation is more conspicuous than on the previous exam, developing edema or infection is a consideration.  There is no pneumothorax or other significant detrimental change since the previous exam.                                       X-Ray Chest AP Portable (Final result)  Result time 07/02/25 11:03:14      Final result by Rakesh Mistry MD (07/02/25 11:03:14)                   Impression:      No acute radiographic findings in the chest on this single view.      Electronically signed by: Rakesh Mistry MD  Date:    07/02/2025  Time:    11:03               Narrative:    EXAMINATION:  XR CHEST AP PORTABLE    CLINICAL HISTORY:  Sepsis;    TECHNIQUE:  Single frontal view of the chest was performed.    COMPARISON:  None.    FINDINGS:  Cardiac monitoring leads project over the bilateral hemithoraces.  Mediastinal structures are midline. Hilar contours are unremarkable.  Cardiac silhouette is normal in size. Lung volumes are normal and symmetric.  No consolidation.  No pneumothorax or pleural effusion.  No free air beneath the diaphragm. No acute osseous abnormalities.  Degenerative changes of the spine and shoulders.                            "           Assessment/Plan:     Assessment & Plan  Severe sepsis  Presents with hypoxia, tachycardia, and WBC of 22 from [facility]. Remains tachycardic in the ED despite IVF, and remains on NC. With sacral decubitus without surrounding cellulitis or drainage. Possible source includes urine. Was tachycardic persistently at [facility] during last hospitalization.  Continue cefepime/vanc/flagyl  Blood/urine cultures pending  Further IVF deferred given history of CHF and repeat x-ray with more conspicuous lung markings  Check CTA chest given hypoxia, tachycardia, and history of PE-GFR 49 above cut off for contrast, discussed with CT        Patient has sepsis with Acute respiratory failure secondary to Urinary Tract Infection. A review of systems was completed. Patient's sepsis is stable    Current Antibiotics    , 4 times daily, Per G Tube  ceFEPIme injection 1 g, Every 12 hours (non-standard times), Intravenous  vancomycin - pharmacy to dose, pharmacy to manage frequency, Intravenous  metroNIDAZOLE tablet 500 mg, Every 8 hours, Oral    Lactate  Recent Labs   Lab 07/02/25  0852 07/02/25  0901 07/02/25  0906 07/02/25  1133   LACTATE 3.5*  --   --  2.4*   POCLAC  --  UNAVAILABLE 4.08*  --      Culture Data  Blood Cultures No results found for: "CULTBLD"   Urine Culture No results found for: "LABURIN"   mSOFA  MSOFA Total  Min: 0   Min taken time: 07/02/25 0915  Max: 3   Max taken time: 07/02/25 1416    Plan  - Antibiotics as listed above  - Fluid resuscitation as follows:Contraindicated- Fluid bolus is contraindicated in this patient due to Congestive Heart Failure   - Trend lactate to resolution  - Follow up culture data  - Vasopressors were initiated to maintain MAP >65 due to persistent hypotension after appropriate fluid administration  - The following services were consulted:Palliative Medicine.    Global aphasia  Non-verbal at baseline. And only eats via tube feedings.     Hemiparesis of right dominant side as late effect " "of cerebral infarction  Per care everywhere. Aphasic at baseline per NH  Confirmed with daughter residual right sided hemiparesis from previous CVA  Hypertension  Patient's blood pressure range in the last 24 hours was: BP  Min: 90/40  Max: 143/70.The patient's inpatient anti-hypertensive regimen is listed below:  Current Antihypertensives  , 2 times daily, FEEDING TUBE    Plan  - BP is controlled, no changes needed to their regimen  - home metoprolol/entresto held for cocnern for sepsis  History of CVA (cerebrovascular accident)  With residual non-verbal status and PEG dependent per discussion with NH    Chronic combined systolic and diastolic CHF (congestive heart failure)  Patient has Combined Systolic and Diastolic heart failure that is Chronic. On presentation their CHF was well compensated. Most recent BNP and echo results are listed below.  No results for input(s): "BNP" in the last 72 hours.  Latest ECHO  No results found for this or any previous visit.    Current Heart Failure Medications  , Daily, Per G Tube  , 2 times daily, FEEDING TUBE    Plan  - Monitor strict I&Os and daily weights.    - Place on telemetry  - Low sodium diet  - Place on fluid restriction of 1.5 L.   - Cardiology has not been consulted  - The patient's volume status is at their baseline  - home metoprolol and entresto held for sepsis.     Echo in care everywhere with EF of 35 to 40% and grade 1 DD.           PEG (percutaneous endoscopic gastrostomy) status  Patient noted to have a percutaneous endoscopic gastrostomy tube in place. I have personally inspected the tube.Tube was placed prior to this admission There are no signs of drainage or infection around the site. The tube is patent. Medications have converted to liquid form if available.  Routine care to be done by wound care and nursing staff.       On Jevity 1.5 continuous per [facility] at 40 cc/hr with 120 FWF every 4. Nutrition consulted  Will hold resuming tube feedings today " given receieved almost 2L of IVF in ED with CHF and repeat x-ray with increased lung markings    Advanced care planning/counseling discussion  Advance Care Planning     Date: 07/02/2025    Code Status  In light of the patients advanced and life limiting illness,I engaged the the family and healthcare power of   in a voluntary conversation about the patient's preferences for care  at the very end of life. The patient wishes to have a natural, peaceful death.  Along those lines, the patient does not wish to have CPR or other invasive treatments performed when her heart and/or breathing stops. I communicated to the family and healthcare power of   that a DNR order would be placed in her medical record to reflect this preference.    A total of 16 min was spent on advance care planning, goals of care discussion, emotional support, formulating and communicating prognosis and exploring burden/benefit of various approaches of treatment. This discussion occurred on a fully voluntary basis with the verbal consent of the patient and/or family.     Discussed with KIRK who is also director of nursing at NH where patient resides. Confirmed DNR status. Palliative care consulted       Seizures  Convert home keppra to IV while hospitalized  History of Clostridioides difficile colitis  Diagnosed 6/2025. Confirmed with NH completed, vanc and no diarrhea prior to arrival  History of pulmonary embolism  Will convert to lovenox while hospitalized. See above. Repeat CTA given  hypoxia tachycardia persistent despite IVF. Confirmed with NH receiving eliquis  VTE Risk Mitigation (From admission, onward)           Ordered     IP VTE HIGH RISK PATIENT  Once         07/02/25 1344     Place sequential compression device  Until discontinued         07/02/25 1344     Place DEBBY hose  Until discontinued         07/02/25 1344                    Discussed with ED physician.    VTE: lovenox  Code: DNR  Diet: NPO  Dispo: pending  cultures  As clarification, on 7/2/2025, patient should be admitted to inpatient services under my care in collaboration with Tanner Colmenares MD. Carlos Nichols PA-C      Critical care time spent on the evaluation and treatment of severe organ dysfunction, review of pertinent labs and imaging studies, discussions with consulting providers and discussions with patient/family: 60 minutes.      Pharmacokinetic Initial Assessment: IV Vancomycin    Assessment/Plan:    Initiate intravenous vancomycin with loading dose of 750 mg once with subsequent doses when random concentrations are less than 20 mcg/mL  Desired empiric serum trough concentration is 10 to 20 mcg/mL  Draw vancomycin random level on 7/3/25 at 0400.  Pharmacy will continue to follow and monitor vancomycin.      Please contact pharmacy at extension [phone] with any questions regarding this assessment.     Thank you for the consult,   Ezra Moser       Patient brief summary:  Lynda Mackenzie is a 69 y.o. female initiated on antimicrobial therapy with IV Vancomycin for treatment of suspected bacteremia    Drug Allergies:   Review of patient's allergies indicates:  No Known Allergies    Actual Body Weight:   39 kg    Renal Function:   Estimated Creatinine Clearance: 27.2 mL/min (based on SCr of 1.2 mg/dL).,     Dialysis Method (if applicable):  N/A    CBC (last 72 hours):  Recent Labs   Lab Result Units 07/02/25  0852   WBC K/uL 22.59*   HGB gm/dL 10.6*   HCT % 33.4*   Platelet Count K/uL 571*   Lymph % % 20.9   Mono % % 9.3   Eos % % 0.4   Basophil % % 0.7       Metabolic Panel (last 72 hours):  Recent Labs   Lab Result Units 07/02/25  0852 07/02/25  1044   Sodium mmol/L 139  --    Potassium mmol/L 4.8  --    Chloride mmol/L 97  --    CO2 mmol/L 25  --    Glucose mg/dL 137*  --    Glucose, UA   --  3+*   BUN mg/dL 40*  --    Creatinine mg/dL 1.2  --    Albumin g/dL 2.3*  --    Bilirubin Total mg/dL 0.3  --    ALP unit/L 100  --    AST unit/L 137*  --   "  ALT unit/L 35  --        Drug levels (last 3 results):  No results for input(s): "VANCOMYCINRA", "VANCORANDOM", "VANCOMYCINPE", "VANCOPEAK", "VANCOMYCINTR", "VANCOTROUGH" in the last 72 hours.    Microbiologic Results:  Microbiology Results (last 7 days)       Procedure Component Value Units Date/Time    Urine culture [[id number]] Collected: 07/02/25 1044    Order Status: Sent Specimen: Urine, Catheterized Updated: 07/02/25 1110    Blood culture x two cultures. Draw prior to antibiotics. [[id number]] Collected: 07/02/25 0917    Order Status: Resulted Specimen: Blood from Peripheral, Forearm, Left Updated: 07/02/25 0957    Blood culture x two cultures. Draw prior to antibiotics. [[id number]] Collected: 07/02/25 0852    Order Status: Resulted Specimen: Blood from Peripheral, Antecubital, Right Updated: 07/02/25 0859              Carlos Nichols PA-C  Department of Hospital Medicine  [facility] - Emergency Dept          "

## 2025-07-02 NOTE — ASSESSMENT & PLAN NOTE
Per care everywhere. Aphasic at baseline per NH  Confirmed with daughter residual right sided hemiparesis from previous CVA

## 2025-07-02 NOTE — EICU
Virtual ICU Admission    Admit Date: 2025  LOS: 0  Code Status: DNR   : 1955  Bed: W278/W278 A:     Diagnosis: Severe sepsis    Patient  has a past medical history of Anticoagulant long-term use, CHF (congestive heart failure), Seizures, and Stroke.    Last VS: /60   Pulse (!) 131   Temp 100 °F (37.8 °C)   Resp (!) 26   Ht 5' (1.524 m)   Wt 39 kg (85 lb 15.7 oz)   SpO2 100%   Breastfeeding No   BMI 16.79 kg/m²       VICU Review    VICU nurse assessment :  [place] completed, LDA documentation reconciliation completed, and VTE prophylaxis review        Nursing orders placed : IP YUE Peripheral IV Access and Gutiérrez Panel

## 2025-07-02 NOTE — PROGRESS NOTES
"Pharmacokinetic Initial Assessment: IV Vancomycin    Assessment/Plan:    Initiate intravenous vancomycin with loading dose of 750 mg once with subsequent doses when random concentrations are less than 20 mcg/mL  Desired empiric serum trough concentration is 10 to 20 mcg/mL  Draw vancomycin random level on 7/3/25 at 0400.  Pharmacy will continue to follow and monitor vancomycin.      Please contact pharmacy at extension [phone] with any questions regarding this assessment.     Thank you for the consult,   Ezra Moser       Patient brief summary:  Lynda Mackenzie is a 69 y.o. female initiated on antimicrobial therapy with IV Vancomycin for treatment of suspected bacteremia    Drug Allergies:   Review of patient's allergies indicates:  No Known Allergies    Actual Body Weight:   39 kg    Renal Function:   Estimated Creatinine Clearance: 27.2 mL/min (based on SCr of 1.2 mg/dL).,     Dialysis Method (if applicable):  N/A    CBC (last 72 hours):  Recent Labs   Lab Result Units 07/02/25  0852   WBC K/uL 22.59*   HGB gm/dL 10.6*   HCT % 33.4*   Platelet Count K/uL 571*   Lymph % % 20.9   Mono % % 9.3   Eos % % 0.4   Basophil % % 0.7       Metabolic Panel (last 72 hours):  Recent Labs   Lab Result Units 07/02/25  0852 07/02/25  1044   Sodium mmol/L 139  --    Potassium mmol/L 4.8  --    Chloride mmol/L 97  --    CO2 mmol/L 25  --    Glucose mg/dL 137*  --    Glucose, UA   --  3+*   BUN mg/dL 40*  --    Creatinine mg/dL 1.2  --    Albumin g/dL 2.3*  --    Bilirubin Total mg/dL 0.3  --    ALP unit/L 100  --    AST unit/L 137*  --    ALT unit/L 35  --        Drug levels (last 3 results):  No results for input(s): "VANCOMYCINRA", "VANCORANDOM", "VANCOMYCINPE", "VANCOPEAK", "VANCOMYCINTR", "VANCOTROUGH" in the last 72 hours.    Microbiologic Results:  Microbiology Results (last 7 days)       Procedure Component Value Units Date/Time    Urine culture [[id number]] Collected: 07/02/25 1044    Order Status: Sent Specimen: " Urine, Catheterized Updated: 07/02/25 1110    Blood culture x two cultures. Draw prior to antibiotics. [[id number]] Collected: 07/02/25 0917    Order Status: Resulted Specimen: Blood from Peripheral, Forearm, Left Updated: 07/02/25 0957    Blood culture x two cultures. Draw prior to antibiotics. [[id number]] Collected: 07/02/25 0852    Order Status: Resulted Specimen: Blood from Peripheral, Antecubital, Right Updated: 07/02/25 0859

## 2025-07-02 NOTE — HPI
Lynda Mackenzie 69 y.o. female with CHF, history of PE on eliquis, seizure disorder on keppra, previous CVA with residual aphasia and right sided hemiparesis, PEG status presents to the hospital from [facility] after witnessed hypoxia and increased work of breathing. The patient is unable to provide any history due to baseline aphasia.     Discussed with nurse from [facility] she reports this morning patient's respiratory rate was elevated near 30 and she was hypoxic requiring supplemental oxygen causing activation of EMS.  At baseline she is not on supplemental oxygen.  She is nonverbal at baseline.  She has been tolerating her tube feeds at 40 cc an hour continuous with 140cc every 4 hours FWF.  She completed her vancomycin from previous hospitalization for C diff colitis.  There has been no diarrhea at the nursing home.  There have been no witnessed episodes of emesis or seizures.  She has not had a fever.  She receives her Keppra and Eliquis via PEG.    Discussed with patient's daughter KIRK he will confirms DNR status.  She has baseline aphasia and right-sided hemiparesis from previous CVA. She is not on home oxygen.     In the ED, tachycardic near 140 white blood cell count 22 initial lactic acid 3.5 repeat 2.4 UA with white blood cells bacteria initial chest x-ray without acute process, repeat after 1.7 L of IVF with increased conspicuous lung markings.

## 2025-07-02 NOTE — ED TRIAGE NOTES
Patient to the ED with EMS report from  [facility], patient was found by Nurse this morning with SOB, tachycardia and retractions noted. Patient opens eyes spontaneously, disoriented x4, non verbal, paralysis noted to right arm, jacyob LE contractures.

## 2025-07-02 NOTE — CLINICAL REVIEW
IP Sepsis Screen (most recent)       Sepsis Screen (IP) - 07/02/25 6853       Is the patient's history or complaint suggestive of a possible infection? Yes  -WL    Are there at least two of the following signs and symptoms present? Yes  -WL    Sepsis signs/symptoms - Tachycardia Tachycardia     >90  -WL    Sepsis signs/symptoms - Tachypnea Tachypnea     >20  -WL    Sepsis signs/symptoms - WBC WBC < 4,000 or WBC > 12,000  -WL    Are any of the following organ dysfunction criteria present and not considered to be due to a chronic condition? Yes  -WL    Organ Dysfunction Criteria Lactate > 2.0  -WL    Initiate Sepsis Protocol No  -WL    Reason sepsis not considered Pt. receiving appropriate management   Sepsis w/u in process, BCx2, LAx2, IVAB -WL              User Key  (r) = Recorded By, (t) = Taken By, (c) = Cosigned By      Initials Name    Stacy Rubin RN

## 2025-07-02 NOTE — PHARMACY MED REC
"    07/02/2025 Unable to assess patient. She does not talk. [facility] is in [facility] Spoke with Sumeet and requested medication list for patient.      Admission Medication History     The home medication history was taken by Jacinto Bravo.    You may go to "Admission" then "Reconcile Home Medications" tabs to review and/or act upon these items.     The home medication list has been updated by the Pharmacy department.   Please read ALL comments highlighted in yellow.   Please address this information as you see fit.    Feel free to contact us if you have any questions or require assistance.      The medications listed below were removed from the home medication list. Please reorder if appropriate:  Patient reports no longer taking the following medication(s):          Medications listed below were obtained from: [employer]- [employer] and Nursing home  (Not in a hospital admission)    Jacinto Bravo [facility] Medication Access Specialist [phone]                    .          "

## 2025-07-02 NOTE — ASSESSMENT & PLAN NOTE
Advance Care Planning     Date: 07/02/2025    Code Status  In light of the patients advanced and life limiting illness,I engaged the the family and healthcare power of   in a voluntary conversation about the patient's preferences for care  at the very end of life. The patient wishes to have a natural, peaceful death.  Along those lines, the patient does not wish to have CPR or other invasive treatments performed when her heart and/or breathing stops. I communicated to the family and healthcare power of   that a DNR order would be placed in her medical record to reflect this preference.    A total of 16 min was spent on advance care planning, goals of care discussion, emotional support, formulating and communicating prognosis and exploring burden/benefit of various approaches of treatment. This discussion occurred on a fully voluntary basis with the verbal consent of the patient and/or family.     Discussed with KIRK who is also director of nursing at NH where patient resides. Confirmed DNR status. Palliative care consulted

## 2025-07-02 NOTE — ASSESSMENT & PLAN NOTE
Patient noted to have a percutaneous endoscopic gastrostomy tube in place. I have personally inspected the tube.Tube was placed prior to this admission There are no signs of drainage or infection around the site. The tube is patent. Medications have converted to liquid form if available.  Routine care to be done by wound care and nursing staff.       On Jevity 1.5 continuous per [facility] at 40 cc/hr with 120 FWF every 4. Nutrition consulted  Will hold resuming tube feedings today given receieved almost 2L of IVF in ED with CHF and repeat x-ray with increased lung markings

## 2025-07-02 NOTE — ASSESSMENT & PLAN NOTE
"Presents with hypoxia, tachycardia, and WBC of 22 from [facility]. Remains tachycardic in the ED despite IVF, and remains on NC. With sacral decubitus without surrounding cellulitis or drainage. Possible source includes urine. Was tachycardic persistently at  during last hospitalization.  Continue cefepime/vanc/flagyl  Blood/urine cultures pending  Further IVF deferred given history of CHF and repeat x-ray with more conspicuous lung markings  Check CTA chest given hypoxia, tachycardia, and history of PE-GFR 49 above cut off for contrast, discussed with CT        Patient has sepsis with Acute respiratory failure secondary to Urinary Tract Infection. A review of systems was completed. Patient's sepsis is stable    Current Antibiotics    , 4 times daily, Per G Tube  ceFEPIme injection 1 g, Every 12 hours (non-standard times), Intravenous  vancomycin - pharmacy to dose, pharmacy to manage frequency, Intravenous  metroNIDAZOLE tablet 500 mg, Every 8 hours, Oral    Lactate  Recent Labs   Lab 07/02/25  0852 07/02/25  0901 07/02/25  0906 07/02/25  1133   LACTATE 3.5*  --   --  2.4*   POCLAC  --  UNAVAILABLE 4.08*  --      Culture Data  Blood Cultures No results found for: "CULTBLD"   Urine Culture No results found for: "LABURIN"   mSOFA  MSOFA Total  Min: 0   Min taken time: 07/02/25 0915  Max: 3   Max taken time: 07/02/25 1416    Plan  - Antibiotics as listed above  - Fluid resuscitation as follows:Contraindicated- Fluid bolus is contraindicated in this patient due to Congestive Heart Failure   - Trend lactate to resolution  - Follow up culture data  - Vasopressors were initiated to maintain MAP >65 due to persistent hypotension after appropriate fluid administration  - The following services were consulted:Palliative Medicine.    "

## 2025-07-02 NOTE — ASSESSMENT & PLAN NOTE
Patient's blood pressure range in the last 24 hours was: BP  Min: 90/40  Max: 143/70.The patient's inpatient anti-hypertensive regimen is listed below:  Current Antihypertensives  , 2 times daily, FEEDING TUBE    Plan  - BP is controlled, no changes needed to their regimen  - home metoprolol/entresto held for cocnern for sepsis

## 2025-07-02 NOTE — ASSESSMENT & PLAN NOTE
Will convert to lovenox while hospitalized. See above. Repeat CTA given  hypoxia tachycardia persistent despite IVF. Confirmed with NH receiving eliquis

## 2025-07-02 NOTE — ED PROVIDER NOTES
Encounter Date: 7/2/2025    SCRIBE #1 NOTE: I, Pau Valle, am scribing for, and in the presence of,  Carrington To MD. I have scribed the following portions of the note - Other sections scribed: HPI, ROS.       History     Chief Complaint   Patient presents with    Shortness of Breath     Pt from [facility] with reports SOB starting this morning. Patient on NRB upon arrival. Sats were initially 96% but was tachypneic and dropped to 80% for EMS. Denies hx COPD. + HX PE. Pt with mild increased WOB at arrival.     69-year-old female with a PMHx of CVA w/ global aphasia, hypertension, s/p PEG tube, seizures (on Keppra,) heart failure, on aspirin, who presents to the Emergency Department via EMS from [facility] with dyspnea since this morning. Per EMS, nurse found patient with O2 sats 80%, with EMS pulse ox sats in 90s.     Per chart review, 06/09/2025 patient admitted with sepsis and C diff infection, Oral vancomycin, UTI treated with 3 days ceftriaxone, Hospital course was complicated with tachycardia and fever, workup revealed small left lower lobe segmental PE, Lovenox b.i.d., switched to Eliquis. Patient also has intolerance to tube feeds. Medication change metoprolol tartrate 25 mg 2 tab per G tube route bid,     The history is provided by the EMS personnel and medical records. The history is limited by the absence of a caregiver. No  was used.     Review of patient's allergies indicates:  No Known Allergies  No past medical history on file.  No past surgical history on file.  No family history on file.  Social History[1]  Review of Systems   Unable to perform ROS: Unstable vital signs   Respiratory:  Positive for shortness of breath.        Physical Exam     Initial Vitals [07/02/25 0814]   BP Pulse Resp Temp SpO2   (!) 90/40 100 (!) 24 98.6 °F (37 °C) 96 %      MAP       --         Physical Exam  The patient was examined specifically for the following:   General:No significant  distress, Good color, Warm and dry. Head and neck:Scalp atraumatic, Neck supple. Neurological:Appropriate conversation, Gross motor deficits. Eyes:Conjugate gaze, Clear corneas. ENT: No epistaxis. Cardiac: Regular rate and rhythm, Grossly normal heart tones. Pulmonary: Wheezing, Rales. Gastrointestinal: Abdominal tenderness, Abdominal distention. Musculoskeletal: Extremity deformity, Apparent pain with range of motion of the joints. Skin: Rash.   The findings on examination were normal except for the following:  The patient will track me with her eyes.  Oxygen saturations are 96% on 100% non-rebreather.  The lungs are clear and free of wheezing rales rubs or rhonchi.  The patient has a gastrostomy tube in the abdomen.  She has a deep 6 cm sacral decubitus ulcer looks clean without surrounding cellulitis or infection.  The patient spontaneously postures in a fetal position.  Her legs can be extended almost straight.  The abdomen seems soft.  The patient's blood pressure is 90/40.  ED Course   Critical Care    Date/Time: 7/2/2025 11:58 AM    Performed by: Carrington To MD  Authorized by: Carrington To MD  Direct patient critical care time: 22 minutes  Additional history critical care time: 11 minutes  Ordering / reviewing critical care time: 11 minutes  Documentation critical care time: 11 minutes  Consulting other physicians critical care time: 11 minutes  Total critical care time (exclusive of procedural time) : 66 minutes  Critical care time was exclusive of separately billable procedures and treating other patients and teaching time.  Critical care was necessary to treat or prevent imminent or life-threatening deterioration of the following conditions: circulatory failure, sepsis and CNS failure or compromise.  Critical care was time spent personally by me on the following activities: development of treatment plan with patient or surrogate, discussions with primary provider, interpretation of cardiac  output measurements, evaluation of patient's response to treatment, examination of patient, obtaining history from patient or surrogate, ordering and performing treatments and interventions, ordering and review of laboratory studies, ordering and review of radiographic studies, pulse oximetry, re-evaluation of patient's condition and review of old charts.        Labs Reviewed   COMPREHENSIVE METABOLIC PANEL - Abnormal       Result Value    Sodium 139      Potassium 4.8      Chloride 97      CO2 25      Glucose 137 (*)     BUN 40 (*)     Creatinine 1.2      Calcium 11.0 (*)     Protein Total 9.2 (*)     Albumin 2.3 (*)     Bilirubin Total 0.3             (*)     ALT 35      Anion Gap 17 (*)     eGFR 49 (*)    LACTIC ACID, PLASMA - Abnormal    Lactic Acid Level 3.5 (*)     Narrative:     Falsely low lactic acid results can be found in samples containing >=13.0 mg/dL total bilirubin and/or >=3.5 mg/dL direct bilirubin.    URINALYSIS, REFLEX TO URINE CULTURE - Abnormal    Color, UA Yellow      Appearance, UA Cloudy (*)     pH, UA 6.0      Spec Grav UA 1.025      Protein, UA 1+ (*)     Glucose, UA 3+ (*)     Ketones, UA Negative      Bilirubin, UA Negative      Blood, UA 3+ (*)     Nitrites, UA Negative      Urobilinogen, UA Negative      Leukocyte Esterase, UA 3+ (*)    CBC WITH DIFFERENTIAL - Abnormal    WBC 22.59 (*)     RBC 3.25 (*)     HGB 10.6 (*)     HCT 33.4 (*)      (*)     MCH 32.6 (*)     MCHC 31.7 (*)     RDW 15.2 (*)     Platelet Count 571 (*)     MPV 10.4      Nucleated RBC 0      Neut % 67.8      Lymph % 20.9      Mono % 9.3      Eos % 0.4      Basophil % 0.7      Imm Grans % 0.9 (*)     Neut # 15.32 (*)     Lymph # 4.73      Mono # 2.09 (*)     Eos # 0.09      Baso # 0.15      Imm Grans # 0.21 (*)    URINALYSIS MICROSCOPIC - Abnormal    RBC, UA >100 (*)     WBC, UA >100 (*)     WBC Clumps, UA Many (*)     Bacteria, UA Moderate (*)     Yeast, UA Many (*)     Squamous Epithelial Cells,  UA 2      Non-Squamous Epithelial Cells 3 (*)     Hyaline Casts, UA 27 (*)     Granular Casts 18 (*)     Microscopic Comment       ISTAT LACTATE - Abnormal    POC Lactate 4.08 (*)     Sample VENOUS      Site Other      Allens Test N/A     CULTURE, BLOOD   CULTURE, BLOOD   CULTURE, URINE   CBC W/ AUTO DIFFERENTIAL    Narrative:     The following orders were created for panel order CBC auto differential.  Procedure                               Abnormality         Status                     ---------                               -----------         ------                     CBC with Differential[[id number]]       Abnormal            Final result                 Please view results for these tests on the individual orders.   LACTIC ACID, PLASMA   LACTIC ACID, PLASMA   GREY TOP URINE HOLD   ISTAT LACTATE    POC Lactate UNAVAILABLE      Sample VENOUS      Site Other      Allens Test N/A            Imaging Results              X-Ray Chest AP Portable (Final result)  Result time 07/02/25 11:03:14      Final result by Rakesh Mistry MD (07/02/25 11:03:14)                   Impression:      No acute radiographic findings in the chest on this single view.      Electronically signed by: Rakesh Mistry MD  Date:    07/02/2025  Time:    11:03               Narrative:    EXAMINATION:  XR CHEST AP PORTABLE    CLINICAL HISTORY:  Sepsis;    TECHNIQUE:  Single frontal view of the chest was performed.    COMPARISON:  None.    FINDINGS:  Cardiac monitoring leads project over the bilateral hemithoraces.  Mediastinal structures are midline. Hilar contours are unremarkable.  Cardiac silhouette is normal in size. Lung volumes are normal and symmetric.  No consolidation.  No pneumothorax or pleural effusion.  No free air beneath the diaphragm. No acute osseous abnormalities.  Degenerative changes of the spine and shoulders.                                       Medications   sodium chloride 0.9% bolus 1,000 mL 1,000 mL (has no  administration in time range)   lactated ringers bolus 1,170 mL (0 mLs Intravenous Stopped 7/2/25 1007)   piperacillin-tazobactam (ZOSYN) 4.5 g in D5W 100 mL IVPB (MB+) (0 g Intravenous Stopped 7/2/25 0944)   vancomycin 750 mg in 0.9% NaCl 250 mL IVPB (admixture device) (0 mg Intravenous Stopped 7/2/25 1115)     Medical Decision Making  Amount and/or Complexity of Data Reviewed  Independent Historian: EMS  Labs: ordered. Decision-making details documented in ED Course.  Radiology: ordered. Decision-making details documented in ED Course.  ECG/medicine tests: ordered and independent interpretation performed. Decision-making details documented in ED Course.    Risk  Prescription drug management.  Decision regarding hospitalization.    Given the above, this patient presents emergency room with altered mental status.  There was some hypoxia in the field.  The patient is now on oxygen 2 L nasal cannula.  Chest x-ray is negative.  Oxygen saturations are 100%.  The white blood count is 10638.  Lactate is 3.5.  Patient's blood pressure is 105/57 after sepsis fluid resuscitation.  I will give more fluids.  The patient is treated with Zosyn and vancomycin in the emergency room.  Her heart rate is interesting for a tachycardia of 141.  The patient was somnolent on arrival.  Mental status is improving.  The patient's urine is greater than 100 white cells greater than 100 red cells.  I believe this patient is uroseptic.    A sepsis reperfusion examination was performed at 11:57 a.m..  The patient has a lactate at 3.5.    I spoke with the patient's daughter who reports that the patient has a history of an ejection fraction of 35%.  I will slow down my fluids.  We will shoot a chest x-ray.  I will give a DuoNeb treatment.  The patient will be admitted to the floor.  I have placed a DNR order at the daughter's request.  She specifically does not want chest compressions cardioversion intubation and mechanical ventilation.         Scribe Attestation:   Scribe #1: I performed the above scribed service and the documentation accurately describes the services I performed. I attest to the accuracy of the note.                               Clinical Impression:  Final diagnoses:  [A41.9, R65.21] Septic shock (Primary)  [N30.90] Cystitis  [R41.82] Altered mental status, unspecified altered mental status type  [R00.0] Sinus tachycardia  [E87.20] Lactic acidosis          ED Disposition Condition    Admit                Please note that the documentation on this chart was provided by the scribe above on the date of service noted above, and that the documentation in the chart accurately reflects the work and decisions made by me alone.  Signed, Carrington Payne MD  07/02/25 1201         [1]         Carrington To MD  07/02/25 5917

## 2025-07-03 PROBLEM — T14.8XXA OPEN WOUND OF SKIN: Status: ACTIVE | Noted: 2025-07-03

## 2025-07-03 PROBLEM — E43 SEVERE PROTEIN-CALORIE MALNUTRITION: Status: ACTIVE | Noted: 2025-07-03

## 2025-07-03 LAB
ABSOLUTE EOSINOPHIL (OHS): 0.13 K/UL
ABSOLUTE MONOCYTE (OHS): 1.01 K/UL (ref 0.3–1)
ABSOLUTE NEUTROPHIL COUNT (OHS): 14.83 K/UL (ref 1.8–7.7)
ALBUMIN SERPL BCP-MCNC: 2.1 G/DL (ref 3.5–5.2)
ALLENS TEST: ABNORMAL
ALP SERPL-CCNC: 89 UNIT/L (ref 40–150)
ALT SERPL W/O P-5'-P-CCNC: 33 UNIT/L (ref 10–44)
ANION GAP (OHS): 14 MMOL/L (ref 8–16)
AST SERPL-CCNC: 148 UNIT/L (ref 11–45)
B PERT DNA NPH QL NAA+PROBE: NOT DETECTED
BASOPHILS # BLD AUTO: 0.08 K/UL
BASOPHILS NFR BLD AUTO: 0.4 %
BILIRUB SERPL-MCNC: 0.3 MG/DL (ref 0.1–1)
BUN SERPL-MCNC: 28 MG/DL (ref 8–23)
C DIFF GDH STL QL: POSITIVE
C DIFF TOX A+B STL QL IA: POSITIVE
C PNEUM DNA LOWER RESP QL NAA+NON-PROBE: NOT DETECTED
CALCIUM SERPL-MCNC: 10.2 MG/DL (ref 8.7–10.5)
CHLORIDE SERPL-SCNC: 102 MMOL/L (ref 95–110)
CO2 SERPL-SCNC: 26 MMOL/L (ref 23–29)
CREAT SERPL-MCNC: 0.8 MG/DL (ref 0.5–1.4)
ERYTHROCYTE [DISTWIDTH] IN BLOOD BY AUTOMATED COUNT: 15 % (ref 11.5–14.5)
FLUAV RNA NPH QL NAA+NON-PROBE: NOT DETECTED
FLUBV RNA NPH QL NAA+NON-PROBE: NOT DETECTED
FOLATE SERPL-MCNC: 10 NG/ML (ref 4–24)
GFR SERPLBLD CREATININE-BSD FMLA CKD-EPI: >60 ML/MIN/1.73/M2
GLUCOSE SERPL-MCNC: 96 MG/DL (ref 70–110)
HADV DNA NPH QL NAA+NON-PROBE: NOT DETECTED
HCO3 UR-SCNC: 30.2 MMOL/L (ref 24–28)
HCOV 229E RNA NPH QL NAA+NON-PROBE: NOT DETECTED
HCOV HKU1 RNA NPH QL NAA+NON-PROBE: NOT DETECTED
HCOV NL63 RNA NPH QL NAA+NON-PROBE: NOT DETECTED
HCOV OC43 RNA NPH QL NAA+NON-PROBE: NOT DETECTED
HCT VFR BLD AUTO: 28.9 % (ref 37–48.5)
HGB BLD-MCNC: 9.2 GM/DL (ref 12–16)
HMPV RNA LOWER RESP QL NAA+NON-PROBE: NOT DETECTED
HMPV RNA NPH QL NAA+NON-PROBE: NOT DETECTED
HOLD SPECIMEN: NORMAL
HPIV1 RNA NPH QL NAA+NON-PROBE: NOT DETECTED
HPIV2 RNA NPH QL NAA+NON-PROBE: NOT DETECTED
HPIV3 RNA NPH QL NAA+NON-PROBE: NOT DETECTED
HPIV4 RNA NPH QL NAA+NON-PROBE: NOT DETECTED
IMM GRANULOCYTES # BLD AUTO: 0.13 K/UL (ref 0–0.04)
IMM GRANULOCYTES NFR BLD AUTO: 0.7 % (ref 0–0.5)
LACTATE SERPL-SCNC: 0.9 MMOL/L (ref 0.5–2.2)
LYMPHOCYTES # BLD AUTO: 2.09 K/UL (ref 1–4.8)
MCH RBC QN AUTO: 32.2 PG (ref 27–31)
MCHC RBC AUTO-ENTMCNC: 31.8 G/DL (ref 32–36)
MCV RBC AUTO: 101 FL (ref 82–98)
NUCLEATED RBC (/100WBC) (OHS): 0 /100 WBC
PCO2 BLDA: 44.8 MMHG (ref 35–45)
PH SMN: 7.44 [PH] (ref 7.35–7.45)
PLATELET # BLD AUTO: 475 K/UL (ref 150–450)
PMV BLD AUTO: 10.3 FL (ref 9.2–12.9)
PO2 BLDA: 412 MMHG (ref 80–100)
POC BE: 5 MMOL/L (ref -2–2)
POC SATURATED O2: 100 % (ref 95–100)
POC TCO2: 32 MMOL/L (ref 23–27)
POTASSIUM SERPL-SCNC: 3.9 MMOL/L (ref 3.5–5.1)
PROT SERPL-MCNC: 8 GM/DL (ref 6–8.4)
RBC # BLD AUTO: 2.86 M/UL (ref 4–5.4)
RELATIVE EOSINOPHIL (OHS): 0.7 %
RELATIVE LYMPHOCYTE (OHS): 11.4 % (ref 18–48)
RELATIVE MONOCYTE (OHS): 5.5 % (ref 4–15)
RELATIVE NEUTROPHIL (OHS): 81.3 % (ref 38–73)
RSV RNA NPH QL NAA+NON-PROBE: NOT DETECTED
RSV RNA NPH QL NAA+NON-PROBE: NOT DETECTED
RV+EV RNA NPH QL NAA+NON-PROBE: NOT DETECTED
SAMPLE: ABNORMAL
SARS-COV-2 RNA RESP QL NAA+PROBE: NOT DETECTED
SITE: ABNORMAL
SODIUM SERPL-SCNC: 142 MMOL/L (ref 136–145)
SPECIMEN SOURCE: NORMAL
VANCOMYCIN SERPL-MCNC: 8.6 UG/ML (ref ?–80)
VIT B12 SERPL-MCNC: >2000 PG/ML (ref 210–950)
WBC # BLD AUTO: 18.27 K/UL (ref 3.9–12.7)

## 2025-07-03 PROCEDURE — 63600175 PHARM REV CODE 636 W HCPCS: Performed by: INTERNAL MEDICINE

## 2025-07-03 PROCEDURE — 63600175 PHARM REV CODE 636 W HCPCS: Performed by: STUDENT IN AN ORGANIZED HEALTH CARE EDUCATION/TRAINING PROGRAM

## 2025-07-03 PROCEDURE — 82607 VITAMIN B-12: CPT | Performed by: STUDENT IN AN ORGANIZED HEALTH CARE EDUCATION/TRAINING PROGRAM

## 2025-07-03 PROCEDURE — 80053 COMPREHEN METABOLIC PANEL: CPT | Performed by: PHYSICIAN ASSISTANT

## 2025-07-03 PROCEDURE — 25000003 PHARM REV CODE 250: Performed by: INTERNAL MEDICINE

## 2025-07-03 PROCEDURE — 99223 1ST HOSP IP/OBS HIGH 75: CPT | Mod: ,,, | Performed by: INTERNAL MEDICINE

## 2025-07-03 PROCEDURE — 36415 COLL VENOUS BLD VENIPUNCTURE: CPT | Performed by: PHYSICIAN ASSISTANT

## 2025-07-03 PROCEDURE — 94799 UNLISTED PULMONARY SVC/PX: CPT

## 2025-07-03 PROCEDURE — A4216 STERILE WATER/SALINE, 10 ML: HCPCS | Performed by: STUDENT IN AN ORGANIZED HEALTH CARE EDUCATION/TRAINING PROGRAM

## 2025-07-03 PROCEDURE — 36415 COLL VENOUS BLD VENIPUNCTURE: CPT | Performed by: SURGERY

## 2025-07-03 PROCEDURE — 85025 COMPLETE CBC W/AUTO DIFF WBC: CPT | Performed by: PHYSICIAN ASSISTANT

## 2025-07-03 PROCEDURE — 36415 COLL VENOUS BLD VENIPUNCTURE: CPT | Performed by: STUDENT IN AN ORGANIZED HEALTH CARE EDUCATION/TRAINING PROGRAM

## 2025-07-03 PROCEDURE — 27000221 HC OXYGEN, UP TO 24 HOURS

## 2025-07-03 PROCEDURE — 80202 ASSAY OF VANCOMYCIN: CPT | Performed by: STUDENT IN AN ORGANIZED HEALTH CARE EDUCATION/TRAINING PROGRAM

## 2025-07-03 PROCEDURE — 25000003 PHARM REV CODE 250: Performed by: STUDENT IN AN ORGANIZED HEALTH CARE EDUCATION/TRAINING PROGRAM

## 2025-07-03 PROCEDURE — 99900035 HC TECH TIME PER 15 MIN (STAT)

## 2025-07-03 PROCEDURE — 94761 N-INVAS EAR/PLS OXIMETRY MLT: CPT

## 2025-07-03 PROCEDURE — 27000207 HC ISOLATION

## 2025-07-03 PROCEDURE — 12000002 HC ACUTE/MED SURGE SEMI-PRIVATE ROOM

## 2025-07-03 PROCEDURE — 82746 ASSAY OF FOLIC ACID SERUM: CPT | Performed by: STUDENT IN AN ORGANIZED HEALTH CARE EDUCATION/TRAINING PROGRAM

## 2025-07-03 PROCEDURE — 63600175 PHARM REV CODE 636 W HCPCS: Performed by: PHYSICIAN ASSISTANT

## 2025-07-03 PROCEDURE — 83605 ASSAY OF LACTIC ACID: CPT | Performed by: SURGERY

## 2025-07-03 PROCEDURE — 99223 1ST HOSP IP/OBS HIGH 75: CPT | Mod: ,,,

## 2025-07-03 RX ORDER — SODIUM CHLORIDE 9 MG/ML
INJECTION, SOLUTION INTRAVENOUS CONTINUOUS
Status: ACTIVE | OUTPATIENT
Start: 2025-07-03 | End: 2025-07-04

## 2025-07-03 RX ORDER — ACETAMINOPHEN 500 MG
1000 TABLET ORAL EVERY 8 HOURS
Status: DISCONTINUED | OUTPATIENT
Start: 2025-07-03 | End: 2025-07-07

## 2025-07-03 RX ORDER — MORPHINE SULFATE 4 MG/ML
2 INJECTION, SOLUTION INTRAMUSCULAR; INTRAVENOUS EVERY 4 HOURS PRN
Status: DISCONTINUED | OUTPATIENT
Start: 2025-07-03 | End: 2025-07-09 | Stop reason: HOSPADM

## 2025-07-03 RX ADMIN — SODIUM CHLORIDE: 9 INJECTION, SOLUTION INTRAVENOUS at 08:07

## 2025-07-03 RX ADMIN — LEVETIRACETAM 500 MG: 100 INJECTION INTRAVENOUS at 08:07

## 2025-07-03 RX ADMIN — ACETAMINOPHEN 1000 MG: 500 TABLET, FILM COATED ORAL at 09:07

## 2025-07-03 RX ADMIN — SODIUM CHLORIDE: 9 INJECTION, SOLUTION INTRAVENOUS at 03:07

## 2025-07-03 RX ADMIN — MORPHINE SULFATE 2 MG: 4 INJECTION, SOLUTION INTRAMUSCULAR; INTRAVENOUS at 04:07

## 2025-07-03 RX ADMIN — METRONIDAZOLE 500 MG: 500 TABLET ORAL at 06:07

## 2025-07-03 RX ADMIN — ENOXAPARIN SODIUM 40 MG: 40 INJECTION SUBCUTANEOUS at 09:07

## 2025-07-03 RX ADMIN — VANCOMYCIN HYDROCHLORIDE 750 MG: 750 INJECTION, POWDER, LYOPHILIZED, FOR SOLUTION INTRAVENOUS at 06:07

## 2025-07-03 RX ADMIN — ACETAMINOPHEN 1000 MG: 500 TABLET, FILM COATED ORAL at 02:07

## 2025-07-03 RX ADMIN — ACETAMINOPHEN 1000 MG: 500 TABLET, FILM COATED ORAL at 07:07

## 2025-07-03 RX ADMIN — LEVETIRACETAM 500 MG: 100 INJECTION INTRAVENOUS at 09:07

## 2025-07-03 RX ADMIN — CEFEPIME 1 G: 1 INJECTION, POWDER, FOR SOLUTION INTRAMUSCULAR; INTRAVENOUS at 03:07

## 2025-07-03 RX ADMIN — MICAFUNGIN SODIUM 100 MG: 100 INJECTION, POWDER, LYOPHILIZED, FOR SOLUTION INTRAVENOUS at 10:07

## 2025-07-03 RX ADMIN — Medication 125 MG: at 01:07

## 2025-07-03 RX ADMIN — Medication 125 MG: at 05:07

## 2025-07-03 NOTE — CONSULTS
[facility] - Intensive Care  Palliative Medicine  Consult Note    Patient Name: Lynda Mackenzie  MRN: [id number]  Admission Date: 7/2/2025  Hospital Length of Stay: 1 days  Code Status: DNR   Attending Provider: Julien Paz MD  Consulting Provider: Carole Colmenares MD  Primary Care Physician: Rut Rosenthal MD  Principal Problem:Severe sepsis    Patient information was obtained from past medical records, ER records, and primary team.      Inpatient consult to Palliative Care  Consult performed by: Carole Colmenares MD  Consult ordered by: Carlos Nichols PA-C  Reason for consult: Advance Care Planning        Assessment/Plan:     Advance Care Planning    - Consult for support and advance care planning in pt with underlying history of CVA (with global aphasia, bedbound status at NH, multiple wounds, frequent hospital stays) currently in ICU with tachycardia and severe sepsis. Chart reviewed; extensively discussed pt by phone yesterday with Carlos Nichols (PA), as well as with Dr Paz during MDT rounds today.   - During visit to bedside, pt resting and appeared reasonably comfortable, though per bedside RN pt grimaces/groans any time she is repositioned.   - Along with Isra Swartz (palliative SW), initially tried to reach pt's daughter Ida Mancilla (MPOA), though this went to voicemail. We attempted her other child Naman after this, though this also went to voicemail. Will try again this afternoon.   - For now, would continue with current level of care; if goals become comfort-focused, pt qualifies for hospice care due to her severe protein calorie malnutrition, and prior CVA with resultant dementia. Even with maximal medical therapy, her overall prognosis is very poor.   - Updated bedside RN and primary staff; will follow up with pt and family early this upcoming week     Addendum 1:35 pm: Along with Dr Paz, we were able to speak to Ida by phone, who confirmed she is pt's daughter and MPOA. Medical update  provided, and addressed her questions and concerns. She seems to have good insight into severity of pt's illness; encouraged her to take things one day at a time while we attempt to treat pt's infection(s). I let her know we have PRN medications available for any signs of discomfort, to which she was in agreement. Plan remains to continue with current level of care in hopes of improvement in pt's infection and overall status; will have further discussions as clinical course evolves. Our team will follow up with pt and family early this upcoming week.     Neuro  Seizures  - Home medications per primary team     History of CVA (cerebrovascular accident)  - Noted history; at baseline is bedbound and requires assistance for all ADLs    Hemiparesis of right dominant side as late effect of cerebral infarction  - Noted    Global aphasia  - Secondary to prior CVA     ID  Severe sepsis  - Evaluation, mgmt per primary team; currently on broad spectrum abx and antifungals. Cultures in process, though has many possible sources, including PEG tube, multiple wounds, history of aspiration, history of C diff, etc.     History of Clostridioides difficile colitis  - Mgmt per primary team    Endocrine  Severe protein-calorie malnutrition  - BMI 16.79 despite tube feeds; this is a poor prognostic factor   - RD consult     GI  PEG (percutaneous endoscopic gastrostomy) status  - Local wound care as PEG site seems inflamed/infected   - RD consult for tube feed recommendations   - Despite these, pt appears frail and malnourished     Orthopedic  Open wound of skin  - Multiple wounds noted on arrival to hospital; reviewed wound pictures.   - Wound care consult  - These are secondary to pt's bedbound status and malnutrition   - As pt has signs of pain/discomfort with repositioning, have ordered low dose PRN morphine that can be given prior to repositioning and/or wound care    Thank you for your consult. I will follow-up with patient. Please  "contact us if you have any additional questions.    PANKAJ Burton  Palliative Medicine Staff   [phone]    > 50% of 70 min visit spent in chart review, face to face discussion of symptom assessment, coordination of care with other specialists, and discharge planning.    Subjective:     HPI:   (From H&P) "Lynda Mackenzie 69 y.o. female with CHF, history of PE on eliquis, seizure disorder on keppra, previous CVA with residual aphasia and right sided hemiparesis, PEG status presents to the hospital from [facility] after witnessed hypoxia and increased work of breathing. The patient is unable to provide any history due to baseline aphasia.      Discussed with nurse from [facility] she reports this morning patient's respiratory rate was elevated near 30 and she was hypoxic requiring supplemental oxygen causing activation of EMS.  At baseline she is not on supplemental oxygen.  She is nonverbal at baseline.  She has been tolerating her tube feeds at 40 cc an hour continuous with 140cc every 4 hours FWF.  She completed her vancomycin from previous hospitalization for C diff colitis.  There has been no diarrhea at the nursing home.  There have been no witnessed episodes of emesis or seizures.  She has not had a fever.  She receives her Keppra and Eliquis via PEG.     Discussed with patient's daughter KIRK he will confirms DNR status.  She has baseline aphasia and right-sided hemiparesis from previous CVA. She is not on home oxygen.      In the ED, tachycardic near 140 white blood cell count 22 initial lactic acid 3.5 repeat 2.4 UA with white blood cells bacteria initial chest x-ray without acute process, repeat after 1.7 L of IVF with increased conspicuous lung markings."     Advance care planning      Past Medical History:   Diagnosis Date    Anticoagulant long-term use     CHF (congestive heart failure)     Seizures     Stroke        No past surgical history on file.    Review of patient's allergies " indicates:  No Known Allergies    Medications:  Continuous Infusions:  Scheduled Meds:   acetaminophen  1,000 mg Per G Tube Q8H    enoxparin  1 mg/kg Subcutaneous Q24H (treatment, non-standard time)    levETIRAcetam (Keppra) IV (PEDS and ADULTS)  500 mg Intravenous Q12H    micafungin  100 mg Intravenous Q24H    vancomycin  125 mg Per G Tube Q6H    Followed by    [START ON 7/13/2025] vancomycin  125 mg Per G Tube Q12H    Followed by    [START ON 7/20/2025] vancomycin  125 mg Per G Tube Daily    Followed by    [START ON 7/27/2025] vancomycin  125 mg Per G Tube Q3 Days     PRN Meds:  Current Facility-Administered Medications:     albuterol-ipratropium, 3 mL, Nebulization, Q4H PRN    morphine, 2 mg, Intravenous, Q4H PRN    naloxone, 0.02 mg, Intravenous, PRN    sodium chloride 0.9%, 10 mL, Intravenous, PRN    Pharmacy to dose Vancomycin consult, , , Once **AND** vancomycin - pharmacy to dose, , Intravenous, pharmacy to manage frequency    Family History    None       Tobacco Use    Smoking status: Not on file    Smokeless tobacco: Not on file   Substance and Sexual Activity    Alcohol use: Not on file    Drug use: Not on file    Sexual activity: Not on file       Review of Systems   Unable to perform ROS: Dementia     Objective:     Vital Signs (Most Recent):  Temp: 98 °F (36.7 °C) (07/03/25 1101)  Pulse: (!) 122 (07/03/25 1101)  Resp: 19 (07/03/25 1101)  BP: (!) 93/43 (07/03/25 1104)  SpO2: 95 % (07/03/25 1101) Vital Signs (24h Range):  Temp:  [98 °F (36.7 °C)-100.1 °F (37.8 °C)] 98 °F (36.7 °C)  Pulse:  [122-153] 122  Resp:  [19-48] 19  SpO2:  [80 %-100 %] 95 %  BP: ()/(43-73) 93/43     Weight: 39 kg (85 lb 15.7 oz)  Body mass index is 16.79 kg/m².       Physical Exam  Constitutional:       Comments: Acutely and chronically ill-appearing, lying in bed, visible cachexia    Abdominal:      Comments: PEG tube in place with purulent drainage from underside    Skin:     Comments: Reviewed wound pictures from media  files       Significant Labs: All pertinent labs within the past 24 hours have been reviewed.  CBC:   Recent Labs   Lab 07/03/25 0313   WBC 18.27*   HGB 9.2*   HCT 28.9*   *   *     BMP:  Recent Labs   Lab 07/03/25 0313   GLU 96      K 3.9      CO2 26   BUN 28*   CREATININE 0.8   CALCIUM 10.2     LFT:  Lab Results   Component Value Date     (H) 07/03/2025    ALKPHOS 89 07/03/2025    BILITOT 0.3 07/03/2025     Albumin:   Albumin   Date Value Ref Range Status   07/03/2025 2.1 (L) 3.5 - 5.2 g/dL Final   06/10/2025 2.8 (L) 3.4 - 5.0 g/dL Final     Protein:   Protein Total   Date Value Ref Range Status   07/03/2025 8.0 6.0 - 8.4 gm/dL Final     Lactic acid:   Lab Results   Component Value Date    LACTATE 2.4 (H) 07/02/2025    LACTATE 3.5 (HH) 07/02/2025       Significant Imaging: I have reviewed all pertinent imaging results/findings within the past 24 hours.

## 2025-07-03 NOTE — ASSESSMENT & PLAN NOTE
- Multiple wounds noted on arrival to hospital; reviewed wound pictures.   - Wound care consult  - These are secondary to pt's bedbound status and malnutrition   - As pt has signs of pain/discomfort with repositioning, have ordered low dose PRN morphine that can be given prior to repositioning and/or wound care

## 2025-07-03 NOTE — ASSESSMENT & PLAN NOTE
Patient's blood pressure range in the last 24 hours was: BP  Min: 88/60  Max: 147/65.The patient's inpatient anti-hypertensive regimen is listed below:  Current Antihypertensives  , 2 times daily, FEEDING TUBE    Plan  - BP is controlled, no changes needed to their regimen  - home metoprolol/entresto held for cocnern for sepsis

## 2025-07-03 NOTE — HPI
"(From H&P) "Lynda CURTIS Gurdeep 69 y.o. 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 The patient is unable to provide any history due to baseline aphasia.      Discussed with nurse from [facility] she reports this morning patient's respiratory rate was elevated near 30 and she was hypoxic requiring supplemental oxygen causing activation of EMS.  At baseline she is not on supplemental oxygen.  She is nonverbal at baseline.  She has been tolerating her tube feeds at 40 cc an hour continuous with 140cc every 4 hours FWF.  She completed her vancomycin from previous hospitalization for C diff colitis.  There has been no diarrhea at the nursing home.  There have been no witnessed episodes of emesis or seizures.  She has not had a fever.  She receives her Keppra and Eliquis via PEG.     Discussed with patient's daughter KIRK he will confirms DNR status.  She has baseline aphasia and right-sided hemiparesis from previous CVA. She is not on home oxygen.      In the ED, tachycardic near 140 white blood cell count 22 initial lactic acid 3.5 repeat 2.4 UA with white blood cells bacteria initial chest x-ray without acute process, repeat after 1.7 L of IVF with increased conspicuous lung markings."     Advance care planning  "

## 2025-07-03 NOTE — PROGRESS NOTES
Pharmacokinetic Assessment Follow Up: IV Vancomycin    Vancomycin serum concentration assessment(s):    The random level was drawn correctly and can be used to guide therapy at this time. The measurement is below the desired definitive target range of 10 to 20 mcg/mL.    Vancomycin Regimen Plan:    Give Vancomycin 750 mg x1 dose and obtain random level 7/4 at 0300    Drug levels (last 3 results):  Recent Labs   Lab Result Units 07/03/25  0313   Vancomycin Random ug/ml 8.6       Pharmacy will continue to follow and monitor vancomycin.    Please contact pharmacy at extension [phone] for questions regarding this assessment.    Thank you for the consult,   Aryan Doyle       Patient brief summary:  Lynda Mackenzie is a 69 y.o. female initiated on antimicrobial therapy with IV Vancomycin for treatment of bacteremia    Drug Allergies:   Review of patient's allergies indicates:  No Known Allergies    Actual Body Weight:   39 kg    Renal Function:   Estimated Creatinine Clearance: 40.9 mL/min (based on SCr of 0.8 mg/dL).,     Dialysis Method (if applicable):  N/A    CBC (last 72 hours):  Recent Labs   Lab Result Units 07/02/25  0852 07/03/25  0313   WBC K/uL 22.59* 18.27*   HGB gm/dL 10.6* 9.2*   HCT % 33.4* 28.9*   Platelet Count K/uL 571* 475*   Lymph % % 20.9 11.4*   Mono % % 9.3 5.5   Eos % % 0.4 0.7   Basophil % % 0.7 0.4       Metabolic Panel (last 72 hours):  Recent Labs   Lab Result Units 07/02/25  0852 07/02/25  1044 07/03/25  0313   Sodium mmol/L 139  --  142   Potassium mmol/L 4.8  --  3.9   Chloride mmol/L 97  --  102   CO2 mmol/L 25  --  26   Glucose mg/dL 137*  --  96   Glucose, UA   --  3+*  --    BUN mg/dL 40*  --  28*   Creatinine mg/dL 1.2  --  0.8   Albumin g/dL 2.3*  --  2.1*   Bilirubin Total mg/dL 0.3  --  0.3   ALP unit/L 100  --  89   AST unit/L 137*  --  148*   ALT unit/L 35  --  33       Vancomycin Administrations:  vancomycin given in the last 96 hours                     vancomycin 750 mg  in 0.9% NaCl 250 mL IVPB (admixture device) (mg) 750 mg New Bag 07/02/25 0945                    Microbiologic Results:  Microbiology Results (last 7 days)       Procedure Component Value Units Date/Time    Clostridium difficile EIA [[id number]] Collected: 07/02/25 2333    Order Status: Sent Specimen: Stool Updated: 07/02/25 2350    Influenza A & B by Molecular [[id number]]  (Normal) Collected: 07/02/25 1829    Order Status: Completed Specimen: Nasal Swab Updated: 07/02/25 1928     INFLUENZA A MOLECULAR Negative     INFLUENZA B MOLECULAR  Negative    Respiratory Infection Panel (PCR), Nasopharyngeal [[id number]] Collected: 07/02/25 1829    Order Status: Sent Specimen: Nasopharyngeal Swab Updated: 07/02/25 1848    Blood culture x two cultures. Draw prior to antibiotics. [[id number]]  (Normal) Collected: 07/02/25 0917    Order Status: Completed Specimen: Blood from Peripheral, Forearm, Left Updated: 07/02/25 1701     Blood Culture No Growth After 6 Hours    Blood culture x two cultures. Draw prior to antibiotics. [[id number]]  (Normal) Collected: 07/02/25 0852    Order Status: Completed Specimen: Blood from Peripheral, Antecubital, Right Updated: 07/02/25 1501     Blood Culture No Growth After 6 Hours    Urine culture [[id number]] Collected: 07/02/25 1044    Order Status: Sent Specimen: Urine, Catheterized Updated: 07/02/25 1110

## 2025-07-03 NOTE — PROGRESS NOTES
[facility] [place]  Nurses Note -- 4 Eyes      7/3/2025       Skin assessed on: Q Shift      [] No Pressure Injuries Present    [x]Prevention Measures Documented    [x] Yes LDA  for Pressure Injury Previously documented     [] Yes New Pressure Injury Discovered   [] LDA for New Pressure Injury Added      Attending RN:  Kathrine Head RN     Second RN:  Shahida RN

## 2025-07-03 NOTE — CONSULTS
[facility] - Intensive Care  Adult Nutrition  Consult Note    SUMMARY     Recommendations    Recommendation:   1. Recommend initiation of EN regimen of Isosource 1.5 at 10 mL/hr and advance as tolerated to goal rate of 45 mL/hr to provide 1620 kcal, 73 gm pro, and 825 mL free water. Additional FWF per MD.   2. Addition of Bradley BID to promote wound healing. Mix 1 packet of Bradley in  mL water, dissolve well and administer via PEG. Addition of 30-60 mL FWF before and after administration.   3. Monitor weight/labs.   4. RD to follow and monitor nutrition status    Goals:   Pt to receive nutrition support by RD follow up    Nutrition Goal Status: new  Communication of RD Recs: other (comment) (POC)    Nutrition Discharge Planning     Nutrition Discharge Planning: Resume home/ nursing home regimen    Assessment and Plan    GI  PEG (percutaneous endoscopic gastrostomy) status  Nutrition Problem  Inadequate energy intake    Related to (etiology):   Physiological state    Signs and Symptoms (as evidenced by):   Pt PEG dependent, not currently receiving EN regimen. Increased metabolic needs due to multiple pressure injuries and suspected severe malnutrition.      Interventions/Recommendations (treatment strategy):  Collaboration with other providers  Enteral Nutrition- Isosource 1.5 with goal rate of 45 mL/hr  Modified Beverage- Bradley BID to promote wound healing     Nutrition Diagnosis Status:   New           Reason for Assessment    Reason For Assessment: consult (Tube feedings)  Diagnosis: infection/sepsis (Severe sepsis)  General Information Comments: Pt currentlty NPO, PEG dependent. 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 Pt currently on special contact precautions to rule-out COVID-19. PIV, PEG. Joe Score: 9 wounds: pressure injuries of sacral spine, right leg, right knee, left and right foot. NFPE not  completed today 2/2 pending COVID1-9 Rule-Out, suspected severe malnutrition due to BMI of 16.79, limited weight history available in chart    Nutrition/Diet History    Nutrition Intake History:  (Per last hospital admission at [facility] TF: Bolus 1.5 cartons (355 mL) Jevity 1.5 (3 x daily). FWF @ 115 mL before and after each bolus feed. TF + FWF = 1597 kcal, 67 gm protein, 1500 mL free water. Meets 100% kcal/protein needs)  Food Preferences: MAGALI  Food Allergies: NKFA  Factors Affecting Nutritional Intake: NPO, impaired cognitive status/motor control, difficulty/impaired swallowing, altered gastrointestinal function  Nutrition-related SDOH: Unable to assess at this time    Anthropometrics    Height: 5' (152.4 cm)  Height (inches): 60 in  Height Method: Stated  Weight: 39 kg (85 lb 15.7 oz)  Weight (lb): 85.98 lb  Weight Method: Stated  Ideal Body Weight (IBW), Female: 100 lb  % Ideal Body Weight, Female (lb): 85.98 %  BMI (Calculated): 16.8  BMI Grade: less than 18.5 - underweight       Lab/Procedures/Meds    Pertinent Labs Reviewed: reviewed  Pertinent Labs Comments: BUN 28, Alb 2.1,   Pertinent Medications Reviewed: reviewed  Pertinent Medications Comments: Acetaminophen, cefepime, enoxaparin, levetiracetam, metronidazole    Estimated/Assessed Needs    Weight Used For Calorie Calculations: 39 kg (85 lb 15.7 oz)  Energy Calorie Requirements (kcal): 1560  Energy Need Method: Kcal/kg (40 mL/hr)  Protein Requirements: 78 (2.0 gm/kg)  Weight Used For Protein Calculations: 39 kg (85 lb 15.7 oz)     Estimated Fluid Requirement Method: RDA Method (or PER MD)  RDA Method (mL): 1560         Nutrition Prescription Ordered    Current Diet Order: NPO    Evaluation of Received Nutrient/Fluid Intake    I/O: 1740/1550  Energy Calories Required: not meeting needs  Protein Required: not meeting needs  Fluid Required: not meeting needs  Comments: LBM 7/3  % Intake of Estimated Energy Needs: 0 - 25 %  % Meal Intake:  NPO    Nutrition Risk    Level of Risk/Frequency of Follow-up:  (2x/week)       Monitor and Evaluation    Monitor and Evaluation: Energy intake, Enteral and parenteral nutrition administration, Weight, Electrolyte and renal panel, Gastrointestinal profile, Glucose/endocrine profile, Inflammatory profile, Lipid profile       Nutrition Follow-Up    RD Follow-up?: Yes

## 2025-07-03 NOTE — PROGRESS NOTES
[facility]  [facility]  Progress Note    Patient Name: Lynda Mackenzie  MRN: [id number]  Patient Class: IP- Inpatient   Admission Date: 7/2/2025  Length of Stay: 1 days  Attending Physician: Julien Paz MD  Primary Care Provider: Rut Rosenthal MD        Subjective     Principal Problem:Severe sepsis        HPI:  Lynda Mackenzie 69 y.o. female with CHF, history of PE on eliquis, seizure disorder on keppra, previous CVA with residual aphasia and right sided hemiparesis, PEG status presents to the hospital from [facility] after witnessed hypoxia and increased work of breathing. The patient is unable to provide any history due to baseline aphasia.     Discussed with nurse from [facility] she reports this morning patient's respiratory rate was elevated near 30 and she was hypoxic requiring supplemental oxygen causing activation of EMS.  At baseline she is not on supplemental oxygen.  She is nonverbal at baseline.  She has been tolerating her tube feeds at 40 cc an hour continuous with 140cc every 4 hours FWF.  She completed her vancomycin from previous hospitalization for C diff colitis.  There has been no diarrhea at the nursing home.  There have been no witnessed episodes of emesis or seizures.  She has not had a fever.  She receives her Keppra and Eliquis via PEG.    Discussed with patient's daughter KIRK he will confirms DNR status.  She has baseline aphasia and right-sided hemiparesis from previous CVA. She is not on home oxygen.     In the ED, tachycardic near 140 white blood cell count 22 initial lactic acid 3.5 repeat 2.4 UA with white blood cells bacteria initial chest x-ray without acute process, repeat after 1.7 L of IVF with increased conspicuous lung markings.        Overview/Hospital Course:  Patient admitted with sepsis, with temp 100F, -150 ST, RR 24-28, and WBC 23k. Started on V+Cefepime+flagyl. WBC went down a bit to 18k, however, hb and platelets went down so suspect  hemodilution after sepsis fluids. Urine growing >100k CFU's of yeast, Micafungin added for now. Still having increased temps without fever (100.1F), will schedule tylenol to help with the ST (HR still 130-145).     She is Cdiff positive again, will order extended course treatment. GPC in one bottle, may be contaminant but will keep IV vanc on until it is determined. Does have some discharge around her PEG, unclear if fungal or food discharge, does have yeast in urine- will keep on micafungin until this is determined and blood cultures are proven to be clear of fungal infection for 24-72h. Cefepime and flagyl stopped, given repeat cdiff infections will try to limit abx quickly- lance GNR/anaerobic antibiotics.     Patient is DNR, watching in ICU until tachycardia reduces to 120 or less, can go to tele.     Interval History:  NAEON.  No new issues.   CC- SOB, fatigue  All questions answered and updates on care given.       ROS:  N/a     Vitals:    07/03/25 0815 07/03/25 0830 07/03/25 0900 07/03/25 0911   BP:   (!) 127/58    Pulse: (!) 137 (!) 134 (!) 134    Resp: (!) 33 (!) 28 (!) 28    Temp:  98.8 °F (37.1 °C)     TempSrc:       SpO2: 95% 96% 98%    Weight:    39 kg (85 lb 15.7 oz)   Height:    5' (1.524 m)          Body mass index is 16.79 kg/m².      PHYSICAL EXAM:  GENERAL APPEARANCE: alert      HEAD: NC/AT  CARDIAC: There is no cyanosis or pallor.   LUNGS: No apparent wheezing or stridor.  ABDOMEN: Non-distended. No guarding.  MSK: No joint erythema or tenderness.   EXTREMITIES: No significant new deformity or new joint abnormality.   SKIN: No lesions or eruptions.          Recent Results (from the past 24 hours)   Urinalysis, Reflex to Urine Culture Urine, Clean Catch    Collection Time: 07/02/25 10:44 AM    Specimen: Urine, Catheterized   Result Value Ref Range    Color, UA Yellow Straw, Charo, Yellow, Light-Orange    Appearance, UA Cloudy (A) Clear    pH, UA 6.0 5.0 - 8.0    Spec Grav UA 1.025 1.005 - 1.030     Protein, UA 1+ (A) Negative    Glucose, UA 3+ (A) Negative    Ketones, UA Negative Negative    Bilirubin, UA Negative Negative    Blood, UA 3+ (A) Negative    Nitrites, UA Negative Negative    Urobilinogen, UA Negative <2.0 EU/dL    Leukocyte Esterase, UA 3+ (A) Negative   Urinalysis Microscopic    Collection Time: 07/02/25 10:44 AM   Result Value Ref Range    RBC, UA >100 (H) 0 - 4 /HPF    WBC, UA >100 (H) 0 - 5 /HPF    WBC Clumps, UA Many (A) None, Rare    Bacteria, UA Moderate (A) None, Rare, Occasional /HPF    Yeast, UA Many (A) None /HPF    Squamous Epithelial Cells, UA 2 <=5 /HPF    Non-Squamous Epithelial Cells 3 (H) <=0 /HPF    Hyaline Casts, UA 27 (H) 0 - 1 /LPF    Granular Casts 18 (H) None  /LPF    Microscopic Comment     Urine culture    Collection Time: 07/02/25 10:44 AM    Specimen: Urine, Catheterized   Result Value Ref Range    Urine Culture >100,000 cfu/ml Yeast species (A)    Lactic acid, plasma #2    Collection Time: 07/02/25 11:33 AM   Result Value Ref Range    Lactic Acid Level 2.4 (H) 0.5 - 2.2 mmol/L   COVID-19 Rapid Screening    Collection Time: 07/02/25  6:29 PM   Result Value Ref Range    SARS COV-2 Molecular Negative Negative   Influenza A & B by Molecular    Collection Time: 07/02/25  6:29 PM    Specimen: Nasal Swab   Result Value Ref Range    INFLUENZA A MOLECULAR Negative Negative    INFLUENZA B MOLECULAR  Negative Negative   ISTAT PROCEDURE    Collection Time: 07/02/25  7:28 PM   Result Value Ref Range    POC PH 7.436 7.35 - 7.45    POC PCO2 44.8 35 - 45 mmHg    POC PO2 412 (H) 80 - 100 mmHg    POC HCO3 30.2 (H) 24 - 28 mmol/L    POC BE 5 (H) -2 to 2 mmol/L    POC SATURATED O2 100 95 - 100 %    POC TCO2 32 (H) 23 - 27 mmol/L    Sample ARTERIAL     Site RB     Allens Test N/A    Comprehensive Metabolic Panel    Collection Time: 07/03/25  3:13 AM   Result Value Ref Range    Sodium 142 136 - 145 mmol/L    Potassium 3.9 3.5 - 5.1 mmol/L    Chloride 102 95 - 110 mmol/L    CO2 26 23 - 29  mmol/L    Glucose 96 70 - 110 mg/dL    BUN 28 (H) 8 - 23 mg/dL    Creatinine 0.8 0.5 - 1.4 mg/dL    Calcium 10.2 8.7 - 10.5 mg/dL    Protein Total 8.0 6.0 - 8.4 gm/dL    Albumin 2.1 (L) 3.5 - 5.2 g/dL    Bilirubin Total 0.3 0.1 - 1.0 mg/dL    ALP 89 40 - 150 unit/L     (H) 11 - 45 unit/L    ALT 33 10 - 44 unit/L    Anion Gap 14 8 - 16 mmol/L    eGFR >60 >60 mL/min/1.73/m2   Vancomycin, Random    Collection Time: 07/03/25  3:13 AM   Result Value Ref Range    Vancomycin Random 8.6 Not established ug/ml   CBC with Differential    Collection Time: 07/03/25  3:13 AM   Result Value Ref Range    WBC 18.27 (H) 3.90 - 12.70 K/uL    RBC 2.86 (L) 4.00 - 5.40 M/uL    HGB 9.2 (L) 12.0 - 16.0 gm/dL    HCT 28.9 (L) 37.0 - 48.5 %     (H) 82 - 98 fL    MCH 32.2 (H) 27.0 - 31.0 pg    MCHC 31.8 (L) 32.0 - 36.0 g/dL    RDW 15.0 (H) 11.5 - 14.5 %    Platelet Count 475 (H) 150 - 450 K/uL    MPV 10.3 9.2 - 12.9 fL    Nucleated RBC 0 <=0 /100 WBC    Neut % 81.3 (H) 38 - 73 %    Lymph % 11.4 (L) 18 - 48 %    Mono % 5.5 4 - 15 %    Eos % 0.7 <=8 %    Basophil % 0.4 <=1.9 %    Imm Grans % 0.7 (H) 0.0 - 0.5 %    Neut # 14.83 (H) 1.8 - 7.7 K/uL    Lymph # 2.09 1 - 4.8 K/uL    Mono # 1.01 (H) 0.3 - 1 K/uL    Eos # 0.13 <=0.5 K/uL    Baso # 0.08 <=0.2 K/uL    Imm Grans # 0.13 (H) 0.00 - 0.04 K/uL       Microbiology Results (last 7 days)       Procedure Component Value Units Date/Time    Blood culture x two cultures. Draw prior to antibiotics. [[id number]]  (Normal) Collected: 07/02/25 0852    Order Status: Completed Specimen: Blood from Peripheral, Antecubital, Right Updated: 07/03/25 0901     Blood Culture No Growth After 24 Hours    Urine culture [[id number]]  (Abnormal) Collected: 07/02/25 1044    Order Status: Completed Specimen: Urine, Catheterized Updated: 07/03/25 0722     Urine Culture >100,000 cfu/ml Yeast species    Clostridium difficile EIA [[id number]] Collected: 07/02/25 2333    Order Status: Sent Specimen: Stool  Updated: 07/02/25 2350    Influenza A & B by Molecular [[id number]]  (Normal) Collected: 07/02/25 1829    Order Status: Completed Specimen: Nasal Swab Updated: 07/02/25 1928     INFLUENZA A MOLECULAR Negative     INFLUENZA B MOLECULAR  Negative    Respiratory Infection Panel (PCR), Nasopharyngeal [[id number]] Collected: 07/02/25 1829    Order Status: Sent Specimen: Nasopharyngeal Swab Updated: 07/02/25 1848    Blood culture x two cultures. Draw prior to antibiotics. [[id number]]  (Normal) Collected: 07/02/25 0917    Order Status: Completed Specimen: Blood from Peripheral, Forearm, Left Updated: 07/02/25 1701     Blood Culture No Growth After 6 Hours             Imaging Results              CTA Chest Non-Coronary (PE Studies) (Final result)  Result time 07/02/25 15:14:10      Final result by Jose Alston MD (07/02/25 15:14:10)                   Impression:      1. No pulmonary thromboembolism.  2. Scattered ground-glass attenuation, findings may reflect edema or other pneumonitis.  Short-term follow-up, no greater than 3 months, is recommended to ensure resolution.  3. Please see above for additional findings.      Electronically signed by: Jose Alston MD  Date:    07/02/2025  Time:    15:14               Narrative:    EXAMINATION:  CTA CHEST NON CORONARY (PE STUDIES)    CLINICAL HISTORY:  Pulmonary embolism (PE) suspected, unknown D-dimer;    TECHNIQUE:  Low dose axial images, sagittal and coronal reformations were obtained from the thoracic inlet to the lung bases following the IV administration of 70 mL of Omnipaque 350.  Contrast timing was optimized to evaluate the pulmonary arteries.  MIP images were performed.    COMPARISON:  Radiograph 07/02/2025    FINDINGS:  The structures at the base of the neck are unremarkable.  No significant mediastinal lymphadenopathy.  The thoracic aorta tapers normally noting atherosclerotic plaque and calcification along its course and in the distribution of the coronary  arteries.  The visualized portions of the kidneys, adrenal glands, spleen and liver are grossly unremarkable.    Allowing for motion artifact, the airways are patent centrally.  There is high attenuating material within few airways to the right upper lobe, uncertain significance.  There is scattered interlobular septal thickening.  There are multiple scattered regions of ground-glass attenuation particularly in a perihilar distribution, may reflect edema or other developing pneumonitis.  No large focal consolidation.  There is bilateral basilar dependent atelectasis.  No pneumothorax.  No pleural effusion.    Bolus timing is adequate for evaluation of pulmonary thromboembolism.  No convincing pulmonary arterial filling defect to the level of the proximal segmental branches bilaterally to suggest pulmonary thromboembolism.    There is osteopenia.  There are degenerative changes of the spine.  No significant axillary lymphadenopathy.                                       X-Ray Chest 1 View (Final result)  Result time 07/02/25 13:47:16      Final result by Jose Alston MD (07/02/25 13:47:16)                   Impression:      As above      Electronically signed by: Jose Alston MD  Date:    07/02/2025  Time:    13:47               Narrative:    EXAMINATION:  XR CHEST 1 VIEW    CLINICAL HISTORY:  Shortness of breath    TECHNIQUE:  Single frontal view of the chest was performed.    COMPARISON:  07/02/2025    FINDINGS:  There is no pneumothorax.  Coarse interstitial attenuation is more conspicuous than on the previous exam, developing edema or infection is a consideration.  There is no pneumothorax or other significant detrimental change since the previous exam.                                       X-Ray Chest AP Portable (Final result)  Result time 07/02/25 11:03:14      Final result by Rakesh Mistry MD (07/02/25 11:03:14)                   Impression:      No acute radiographic findings in the chest on  this single view.      Electronically signed by: Rakesh Mistry MD  Date:    07/02/2025  Time:    11:03               Narrative:    EXAMINATION:  XR CHEST AP PORTABLE    CLINICAL HISTORY:  Sepsis;    TECHNIQUE:  Single frontal view of the chest was performed.    COMPARISON:  None.    FINDINGS:  Cardiac monitoring leads project over the bilateral hemithoraces.  Mediastinal structures are midline. Hilar contours are unremarkable.  Cardiac silhouette is normal in size. Lung volumes are normal and symmetric.  No consolidation.  No pneumothorax or pleural effusion.  No free air beneath the diaphragm. No acute osseous abnormalities.  Degenerative changes of the spine and shoulders.                                               Assessment & Plan  Severe sepsis  Presents with hypoxia, tachycardia, and WBC of 22 from [facility]. Remains tachycardic in the ED despite IVF, and remains on NC. With sacral decubitus without surrounding cellulitis or drainage. Possible source includes urine. Was tachycardic persistently at [facility] during last hospitalization.  Continue cefepime/vanc/flagyl  Blood/urine cultures pending  Further IVF deferred given history of CHF and repeat x-ray with more conspicuous lung markings  Check CTA chest given hypoxia, tachycardia, and history of PE-GFR 49 above cut off for contrast, discussed with CT        Patient has sepsis with Acute respiratory failure secondary to Urinary Tract Infection. A review of systems was completed. Patient's sepsis is stable    Current Antibiotics    , 4 times daily, Per G Tube  ceFEPIme injection 1 g, Every 12 hours (non-standard times), Intravenous  vancomycin - pharmacy to dose, pharmacy to manage frequency, Intravenous  metroNIDAZOLE tablet 500 mg, Every 8 hours, Per G Tube    Lactate  Recent Labs   Lab 07/02/25  0852 07/02/25  0901 07/02/25  0906 07/02/25  1133   LACTATE 3.5*  --   --  2.4*   POCLAC  --  UNAVAILABLE 4.08*  --      Culture Data  Blood Cultures   Blood  "Culture   Date Value Ref Range Status   07/02/2025 No Growth After 6 Hours  Preliminary   07/02/2025 No Growth After 24 Hours  Preliminary      Urine Culture   Urine Culture   Date Value Ref Range Status   07/02/2025 >100,000 cfu/ml Yeast species (A)  Preliminary      mSOFA  MSOFA Total  Min: 0   Min taken time: 07/03/25 0901  Max: 4   Max taken time: 07/03/25 0402    Plan  - Antibiotics as listed above  - Fluid resuscitation as follows:Contraindicated- Fluid bolus is contraindicated in this patient due to Congestive Heart Failure   - Trend lactate to resolution  - Follow up culture data  - Vasopressors were initiated to maintain MAP >65 due to persistent hypotension after appropriate fluid administration  - The following services were consulted:Palliative Medicine.    Global aphasia  Non-verbal at baseline. And only eats via tube feedings.     Hemiparesis of right dominant side as late effect of cerebral infarction  Per care everywhere. Aphasic at baseline per NH  Confirmed with daughter residual right sided hemiparesis from previous CVA  Hypertension  Patient's blood pressure range in the last 24 hours was: BP  Min: 88/60  Max: 147/65.The patient's inpatient anti-hypertensive regimen is listed below:  Current Antihypertensives  , 2 times daily, FEEDING TUBE    Plan  - BP is controlled, no changes needed to their regimen  - [facility]  History of CVA (cerebrovascular accident)  With residual non-verbal status and PEG dependent per discussion with NH    Chronic combined systolic and diastolic CHF (congestive heart failure)  Patient has Combined Systolic and Diastolic heart failure that is Chronic. On presentation their CHF was well compensated. Most recent BNP and echo results are listed below.  No results for input(s): "BNP" in the last 72 hours.  Latest ECHO  No results found for this or any previous visit.    Current Heart Failure Medications  , Daily, Per G Tube  , 2 " times daily, FEEDING TUBE    Plan  - Monitor strict I&Os and daily weights.    - Place on telemetry  - Low sodium diet  - Place on fluid restriction of 1.5 L.   - Cardiology has not been consulted  - The patient's volume status is at their baseline  - home metoprolol and entresto held for sepsis.     Echo in care everywhere with EF of 35 to 40% and grade 1 DD.           PEG (percutaneous endoscopic gastrostomy) status  Patient noted to have a percutaneous endoscopic gastrostomy tube in place. I have personally inspected the tube.Tube was placed prior to this admission There are no signs of drainage or infection around the site. The tube is patent. Medications have converted to liquid form if available.  Routine care to be done by wound care and nursing staff.       On Jevity 1.5 continuous per [facility] at 40 cc/hr with 120 FWF every 4. Nutrition consulted  Will hold resuming tube feedings today given receieved almost 2L of IVF in ED with CHF and repeat x-ray with increased lung markings    Advanced care planning/counseling discussion  Advance Care Planning     Date: 07/02/2025    Code Status  In light of the patients advanced and life limiting illness,I engaged the the family and healthcare power of   in a voluntary conversation about the patient's preferences for care  at the very end of life. The patient wishes to have a natural, peaceful death.  Along those lines, the patient does not wish to have CPR or other invasive treatments performed when her heart and/or breathing stops. I communicated to the family and healthcare power of   that a DNR order would be placed in her medical record to reflect this preference.    A total of 16 min was spent on advance care planning, goals of care discussion, emotional support, formulating and communicating prognosis and exploring burden/benefit of various approaches of treatment. This discussion occurred on a fully voluntary basis with the verbal consent of  the patient and/or family.     Discussed with KIRK who is also director of nursing at NH where patient resides. Confirmed DNR status. Palliative care consulted       Seizures  Convert home keppra to IV while hospitalized  Avoid tramadol and other seizure lowering meds  History of Clostridioides difficile colitis  Diagnosed 6/2025. Confirmed with NH completed, vanc and no diarrhea prior to arrival  History of pulmonary embolism  Will convert to lovenox while hospitalized. See above. Repeat CTA given  hypoxia tachycardia persistent despite IVF. Confirmed with NH receiving eliquis  VTE Risk Mitigation (From admission, onward)           Ordered     enoxaparin injection 40 mg  Every 24 hours         07/02/25 1425     IP VTE HIGH RISK PATIENT  Once         07/02/25 1344     Place sequential compression device  Until discontinued         07/02/25 1344     Place DEBBY hose  Until discontinued         07/02/25 1344                    Discharge Planning   LORENA:      Code Status: DNR   Medical Readiness for Discharge Date:   Discharge Plan A: Skilled Nursing Facility              Critical care time spent on the evaluation and treatment of severe organ dysfunction, review of pertinent labs and imaging studies, discussions with consulting providers and discussions with patient/family: 35 minutes.          Julien Paz MD  Department of Hospital Medicine   [facility] - Intensive Care

## 2025-07-03 NOTE — HOSPITAL COURSE
Ms Lynda Mackenzie is a 69 y.o. woman with recent CVA with aphasia, PEG in place, R sided weakness who was admitted with sepsis. Diagnosed with Cdiff and Candida UTI. Continues on treatment with PO vanc (end 8/9/25) and IV micafungin (end 7/9/25). Completed micafungin. Stable for discharge back to nursing home with course of vanc per PEG. Continue wound care. Follow with PCP.

## 2025-07-03 NOTE — SUBJECTIVE & OBJECTIVE
Past Medical History:   Diagnosis Date    Anticoagulant long-term use     CHF (congestive heart failure)     Seizures     Stroke        No past surgical history on file.    Review of patient's allergies indicates:  No Known Allergies    Medications:  Continuous Infusions:  Scheduled Meds:   acetaminophen  1,000 mg Per G Tube Q8H    enoxparin  1 mg/kg Subcutaneous Q24H (treatment, non-standard time)    levETIRAcetam (Keppra) IV (PEDS and ADULTS)  500 mg Intravenous Q12H    micafungin  100 mg Intravenous Q24H    vancomycin  125 mg Per G Tube Q6H    Followed by    [START ON 7/13/2025] vancomycin  125 mg Per G Tube Q12H    Followed by    [START ON 7/20/2025] vancomycin  125 mg Per G Tube Daily    Followed by    [START ON 7/27/2025] vancomycin  125 mg Per G Tube Q3 Days     PRN Meds:  Current Facility-Administered Medications:     albuterol-ipratropium, 3 mL, Nebulization, Q4H PRN    morphine, 2 mg, Intravenous, Q4H PRN    naloxone, 0.02 mg, Intravenous, PRN    sodium chloride 0.9%, 10 mL, Intravenous, PRN    Pharmacy to dose Vancomycin consult, , , Once **AND** vancomycin - pharmacy to dose, , Intravenous, pharmacy to manage frequency    Family History    None       Tobacco Use    Smoking status: Not on file    Smokeless tobacco: Not on file   Substance and Sexual Activity    Alcohol use: Not on file    Drug use: Not on file    Sexual activity: Not on file       Review of Systems   Unable to perform ROS: Dementia     Objective:     Vital Signs (Most Recent):  Temp: 98 °F (36.7 °C) (07/03/25 1101)  Pulse: (!) 122 (07/03/25 1101)  Resp: 19 (07/03/25 1101)  BP: (!) 93/43 (07/03/25 1104)  SpO2: 95 % (07/03/25 1101) Vital Signs (24h Range):  Temp:  [98 °F (36.7 °C)-100.1 °F (37.8 °C)] 98 °F (36.7 °C)  Pulse:  [122-153] 122  Resp:  [19-48] 19  SpO2:  [80 %-100 %] 95 %  BP: ()/(43-73) 93/43     Weight: 39 kg (85 lb 15.7 oz)  Body mass index is 16.79 kg/m².       Physical Exam  Constitutional:       Comments: Acutely  and chronically ill-appearing, lying in bed, visible cachexia    Abdominal:      Comments: PEG tube in place with purulent drainage from underside    Skin:     Comments: Reviewed wound pictures from media files       Significant Labs: All pertinent labs within the past 24 hours have been reviewed.  CBC:   Recent Labs   Lab 07/03/25 0313   WBC 18.27*   HGB 9.2*   HCT 28.9*   *   *     BMP:  Recent Labs   Lab 07/03/25 0313   GLU 96      K 3.9      CO2 26   BUN 28*   CREATININE 0.8   CALCIUM 10.2     LFT:  Lab Results   Component Value Date     (H) 07/03/2025    ALKPHOS 89 07/03/2025    BILITOT 0.3 07/03/2025     Albumin:   Albumin   Date Value Ref Range Status   07/03/2025 2.1 (L) 3.5 - 5.2 g/dL Final   06/10/2025 2.8 (L) 3.4 - 5.0 g/dL Final     Protein:   Protein Total   Date Value Ref Range Status   07/03/2025 8.0 6.0 - 8.4 gm/dL Final     Lactic acid:   Lab Results   Component Value Date    LACTATE 2.4 (H) 07/02/2025    LACTATE 3.5 (HH) 07/02/2025       Significant Imaging: I have reviewed all pertinent imaging results/findings within the past 24 hours.

## 2025-07-03 NOTE — ASSESSMENT & PLAN NOTE
Nutrition Problem  Inadequate energy intake    Related to (etiology):   Physiological state    Signs and Symptoms (as evidenced by):   Pt PEG dependent, not currently receiving EN regimen. Increased metabolic needs due to multiple pressure injuries and suspected severe malnutrition.      Interventions/Recommendations (treatment strategy):  Collaboration with other providers  Enteral Nutrition- Isosource 1.5 with goal rate of 45 mL/hr  Modified Beverage- Bradley BID to promote wound healing     Nutrition Diagnosis Status:   New

## 2025-07-03 NOTE — ASSESSMENT & PLAN NOTE
- Evaluation, mgmt per primary team; currently on broad spectrum abx and antifungals. Cultures in process, though has many possible sources, including PEG tube, multiple wounds, history of aspiration, history of C diff, etc.

## 2025-07-03 NOTE — EICU
Intervention Initiated From:  COR / DEU    Yara intervened regarding:  Rounding (Video assessment)    VICU Night Rounds Checklist  24H Vital Sign Range:  Temp:  [98.6 °F (37 °C)-100 °F (37.8 °C)]   Pulse:  [100-142]   Resp:  [24-75]   BP: ()/(40-70)   SpO2:  [91 %-100 %]     Video rounds and LDA reconciliation

## 2025-07-03 NOTE — EICU
Virtual ICU Quality Rounds    Admit Date: 7/2/2025  Hospital Day: 1    ICU Day: 15h    24H Vital Sign Range:  Temp:  [98.2 °F (36.8 °C)-100.1 °F (37.8 °C)]   Pulse:  [126-153]   Resp:  [23-75]   BP: ()/(52-73)   SpO2:  [80 %-100 %]     VICU Surveillance Screening    Daily review of  line necessity(optional): Urinary catheter in place

## 2025-07-03 NOTE — PLAN OF CARE
Problem: Skin Injury Risk Increased  Goal: Skin Health and Integrity  Outcome: Not Progressing     Problem: Adult Inpatient Plan of Care  Goal: Absence of Hospital-Acquired Illness or Injury  Outcome: Progressing  Goal: Optimal Comfort and Wellbeing  Outcome: Not Progressing  Goal: Readiness for Transition of Care  Outcome: Progressing     Problem: Wound  Goal: Optimal Coping  Outcome: Not Progressing  Goal: Optimal Functional Ability  Outcome: Not Progressing

## 2025-07-03 NOTE — ASSESSMENT & PLAN NOTE
- Consult for support and advance care planning in pt with underlying history of CVA (with global aphasia, bedbound status, multiple wounds, frequent hospital stays) currently in ICU with tachycardia and severe sepsis. Chart reviewed; had extensively discussed pt by phone yesterday with Carlos Nichols (PA), as well as with Dr Paz during MDT rounds today.   - During visit to bedside, pt resting and appeared comfortable, though per bedside RN pt grimaces/groans any time she is repositioned.   - Along with Isra Swartz (palliative SW), initially tried to reach pt's daughter Ida Mancilla (MPOA), though this went to voicemail. We attempted her other child Naman after this, though this also went to voicemail. Will try again this afternoon.   - For now, would continue with current level of care; if goals become comfort-focused, pt qualifies for hospice care due to her severe protein calorie malnutrition, and prior CVA with resultant dementia. Even with maximal medical therapy, her overall prognosis is very poor.   - Updated bedside RN and primary staff

## 2025-07-03 NOTE — ASSESSMENT & PLAN NOTE
- Local wound care as PEG site seems inflamed/infected   - RD consult for tube feed recommendations   - Despite these, pt appears frail and malnourished

## 2025-07-03 NOTE — NURSING
[facility]  Nurses Note -- 4 Eyes      7/2/2025       Skin assessed on: Admit      [] No Pressure Injuries Present    []Prevention Measures Documented    [x] Yes LDA multiple present on admit; see photo documentaion  [] Yes New Pressure Injury Discovered   [] LDA for New Pressure Injury Added      Attending RN:  Candy Harris, RN     Second RN:  To Albarran

## 2025-07-03 NOTE — NURSING
[facility]  Nurses Note -- 4 Eyes      7/2/2025       Skin assessed on: Q Shift      [] No Pressure Injuries Present    []Prevention Measures Documented    [x] Yes LDA  for Pressure Injury Previously documented     [] Yes New Pressure Injury Discovered   [] LDA for New Pressure Injury Added      Attending RN:  SHERIF Pinedo     Second RN:  SHERIF Ariza

## 2025-07-03 NOTE — PLAN OF CARE
Recommendation:   1. Recommend initiation of EN regimen of Isosource 1.5 at 10 mL/hr and advance as tolerated to goal rate of 45 mL/hr to provide 1620 kcal, 73 gm pro, and 825 mL free water. Additional FWF per MD.   2. Addition of Bradley BID to promote wound healing. Mix 1 packet of Bradley in  mL water, dissolve well and administer via PEG. Addition of 30-60 mL FWF before and after administration.   3. Monitor weight/labs.   4. RD to follow and monitor nutrition status    Goals:   Pt to receive nutrition support by RD follow up    Nutrition Goal Status: new  Communication of RD Recs: other (comment) (POC)

## 2025-07-03 NOTE — ASSESSMENT & PLAN NOTE
Presents with hypoxia, tachycardia, and WBC of 22 from [facility]. Remains tachycardic in the ED despite IVF, and remains on NC. With sacral decubitus without surrounding cellulitis or drainage. Possible source includes urine. Was tachycardic persistently at [facility] during last hospitalization.  Continue cefepime/vanc/flagyl  Blood/urine cultures pending  Further IVF deferred given history of CHF and repeat x-ray with more conspicuous lung markings  Check CTA chest given hypoxia, tachycardia, and history of PE-GFR 49 above cut off for contrast, discussed with CT        Patient has sepsis with Acute respiratory failure secondary to Urinary Tract Infection. A review of systems was completed. Patient's sepsis is stable    Current Antibiotics    , 4 times daily, Per G Tube  ceFEPIme injection 1 g, Every 12 hours (non-standard times), Intravenous  vancomycin - pharmacy to dose, pharmacy to manage frequency, Intravenous  metroNIDAZOLE tablet 500 mg, Every 8 hours, Per G Tube    Lactate  Recent Labs   Lab 07/02/25  0852 07/02/25  0901 07/02/25  0906 07/02/25  1133   LACTATE 3.5*  --   --  2.4*   POCLAC  --  UNAVAILABLE 4.08*  --      Culture Data  Blood Cultures   Blood Culture   Date Value Ref Range Status   07/02/2025 No Growth After 6 Hours  Preliminary   07/02/2025 No Growth After 24 Hours  Preliminary      Urine Culture   Urine Culture   Date Value Ref Range Status   07/02/2025 >100,000 cfu/ml Yeast species (A)  Preliminary      mSOFA  MSOFA Total  Min: 0   Min taken time: 07/03/25 0901  Max: 4   Max taken time: 07/03/25 0402    Plan  - Antibiotics as listed above  - Fluid resuscitation as follows:Contraindicated- Fluid bolus is contraindicated in this patient due to Congestive Heart Failure   - Trend lactate to resolution  - Follow up culture data  - Vasopressors were initiated to maintain MAP >65 due to persistent hypotension after appropriate fluid administration  - The following services were consulted:Palliative  Medicine.

## 2025-07-03 NOTE — CONSULTS
[facility] - Intensive Care  Wound Care  [facility]    Patient Name:  Lynda Mackenzie   MRN:  [id number]  Date: 7/3/2025  Diagnosis: Severe sepsis    History:     Past Medical History:   Diagnosis Date    Anticoagulant long-term use     CHF (congestive heart failure)     Seizures     Stroke        Social History[1]    Precautions:     Allergies as of 07/02/2025    (No Known Allergies)       [facility] Assessment Details/Treatment     Active Problem List with Overview Notes    Diagnosis Date Noted    Severe protein-calorie malnutrition 07/03/2025    Open wound of skin 07/03/2025    History of CVA (cerebrovascular accident) 07/02/2025    Chronic combined systolic and diastolic CHF (congestive heart failure) 07/02/2025    Severe sepsis 07/02/2025    PEG (percutaneous endoscopic gastrostomy) status 07/02/2025    Advanced care planning/counseling discussion 07/02/2025    Seizures 07/02/2025    History of Clostridioides difficile colitis 07/02/2025    History of pulmonary embolism 07/02/2025    Global aphasia 04/29/2025    Hemiparesis of right dominant side as late effect of cerebral infarction 04/29/2025    Hypertension 04/29/2025      Consulted for altered skin integrity sacral wound  A 69 year old female admitted 7/2/25 from [facility] with complaint of shortness of breath. History of PE- on Eliquis.   7/3 WBC 18.27 Hgb 9.2 Hct 28.9 Alb 2.1 Weight 39 kg  4/28 A1C 5.3   On Isolibrium mattress; Joe score 9; mandujano catheter; fecal incontinence  7/2 5:30 pm 4 Eyes Skin Assessment- New Pressure Injury discovered  Photodocumentation (from Media 7/2)    Sacrum     Sacrum    Perirectal skin/buttocks    Right buttock    Right medial foot/ankle    Right posterior heel    Right lateral ankle    Right great toe    Right calf    Right lateral knee    Left plantar foot  Photodocumentation (from Media 7/3)    PEG site  Assessment:  Sacrum- Stage 4 pressure injury with opening 8 cm(L) 6 cm(W) with partial thickness wound from 1-6 o'clock. Base of  wound light red in color. No surrounding erythema/induration. Small amount serosanguineous drainage.   Perirectal skin/buttocks- Moisture associated skin damage  Right buttock/hip- Area of DTPI 7 cm circular area without induration  Right medial foot- Dark dried blister without surrounding erythema  Right lateral ankle- Unstageable pressure injury with moist eschar 5 cm(L) 3 cm(W). Small amount of serosanguineous drainage.  Bilateral feet- Tips of toes dark and drying without surrounding erythema  Weak palpable pulses  Right calf- DTPI almost completely circumferential wound with intact epidermis. No surrounding erythema.   Right lateral knee- Unstageable pressure injury 2.5 cm circular wound with dry roof. No surrounding erythema/induration.   PEG site- Dark dry drainage around tube. Tube enlarging stoma site laterally due to position of tube.   Treatment/Plan:  Sacrum, right lateral knee and right lateral ankle- Local wound with Vashe dressings every shift  Perirectal skin- Treatment Remedy products- cleanser and moisture barrier  PEG site- Cleanse with Vashe daily  Right calf & toes- Keep calf wound and toes clean and dry. Avoid pressure   Recommendations made to primary team. Orders placed.     07/03/2025       [1]   Social History  Socioeconomic History    Marital status: Unknown     Social Drivers of Health     Food Insecurity: Patient Unable To Answer (6/10/2025)    Received from [facility]    Hunger Vital Sign     Worried About Running Out of Food in the Last Year: Patient unable to answer     Ran Out of Food in the Last Year: Patient unable to answer   Transportation Needs: Patient Unable To Answer (6/10/2025)    Received from [facility]    PRAPARE - Transportation     Lack of Transportation (Medical): Patient unable to answer     Lack of Transportation (Non-Medical): Patient unable to answer   Housing Stability: Patient Unable To Answer (6/10/2025)    Received from [facility]    Housing Stability Vital  Sign     Unable to Pay for Housing in the Last Year: Patient unable to answer     Number of Times Moved in the Last Year: 0     Homeless in the Last Year: Patient unable to answer

## 2025-07-04 PROBLEM — N30.00 ACUTE CYSTITIS WITHOUT HEMATURIA: Status: ACTIVE | Noted: 2025-07-04

## 2025-07-04 LAB
ABSOLUTE EOSINOPHIL (OHS): 0.26 K/UL
ABSOLUTE MONOCYTE (OHS): 0.75 K/UL (ref 0.3–1)
ABSOLUTE NEUTROPHIL COUNT (OHS): 5.91 K/UL (ref 1.8–7.7)
ANION GAP (OHS): 11 MMOL/L (ref 8–16)
BACTERIA UR CULT: ABNORMAL
BASOPHILS # BLD AUTO: 0.02 K/UL
BASOPHILS NFR BLD AUTO: 0.2 %
BUN SERPL-MCNC: 14 MG/DL (ref 8–23)
CALCIUM SERPL-MCNC: 8.7 MG/DL (ref 8.7–10.5)
CHLORIDE SERPL-SCNC: 109 MMOL/L (ref 95–110)
CO2 SERPL-SCNC: 21 MMOL/L (ref 23–29)
CREAT SERPL-MCNC: 0.6 MG/DL (ref 0.5–1.4)
ERYTHROCYTE [DISTWIDTH] IN BLOOD BY AUTOMATED COUNT: 15.1 % (ref 11.5–14.5)
GFR SERPLBLD CREATININE-BSD FMLA CKD-EPI: >60 ML/MIN/1.73/M2
GLUCOSE SERPL-MCNC: 159 MG/DL (ref 70–110)
HCT VFR BLD AUTO: 27.6 % (ref 37–48.5)
HGB BLD-MCNC: 8.5 GM/DL (ref 12–16)
HOLD SPECIMEN: NORMAL
HOLD SPECIMEN: NORMAL
IMM GRANULOCYTES # BLD AUTO: 0.03 K/UL (ref 0–0.04)
IMM GRANULOCYTES NFR BLD AUTO: 0.4 % (ref 0–0.5)
LYMPHOCYTES # BLD AUTO: 1.44 K/UL (ref 1–4.8)
MCH RBC QN AUTO: 32.4 PG (ref 27–31)
MCHC RBC AUTO-ENTMCNC: 30.8 G/DL (ref 32–36)
MCV RBC AUTO: 105 FL (ref 82–98)
NUCLEATED RBC (/100WBC) (OHS): 0 /100 WBC
PLATELET # BLD AUTO: 399 K/UL (ref 150–450)
PMV BLD AUTO: 10.4 FL (ref 9.2–12.9)
POTASSIUM SERPL-SCNC: 3.5 MMOL/L (ref 3.5–5.1)
RBC # BLD AUTO: 2.62 M/UL (ref 4–5.4)
RELATIVE EOSINOPHIL (OHS): 3.1 %
RELATIVE LYMPHOCYTE (OHS): 17.1 % (ref 18–48)
RELATIVE MONOCYTE (OHS): 8.9 % (ref 4–15)
RELATIVE NEUTROPHIL (OHS): 70.3 % (ref 38–73)
SODIUM SERPL-SCNC: 141 MMOL/L (ref 136–145)
TSH SERPL-ACNC: 2 UIU/ML (ref 0.4–4)
VANCOMYCIN SERPL-MCNC: 10.7 UG/ML (ref ?–80)
WBC # BLD AUTO: 8.41 K/UL (ref 3.9–12.7)

## 2025-07-04 PROCEDURE — 12000002 HC ACUTE/MED SURGE SEMI-PRIVATE ROOM

## 2025-07-04 PROCEDURE — 36415 COLL VENOUS BLD VENIPUNCTURE: CPT | Performed by: STUDENT IN AN ORGANIZED HEALTH CARE EDUCATION/TRAINING PROGRAM

## 2025-07-04 PROCEDURE — 80202 ASSAY OF VANCOMYCIN: CPT | Performed by: STUDENT IN AN ORGANIZED HEALTH CARE EDUCATION/TRAINING PROGRAM

## 2025-07-04 PROCEDURE — 25000003 PHARM REV CODE 250: Performed by: STUDENT IN AN ORGANIZED HEALTH CARE EDUCATION/TRAINING PROGRAM

## 2025-07-04 PROCEDURE — 63600175 PHARM REV CODE 636 W HCPCS: Performed by: STUDENT IN AN ORGANIZED HEALTH CARE EDUCATION/TRAINING PROGRAM

## 2025-07-04 PROCEDURE — 27000207 HC ISOLATION

## 2025-07-04 PROCEDURE — 99900035 HC TECH TIME PER 15 MIN (STAT)

## 2025-07-04 PROCEDURE — 25000003 PHARM REV CODE 250: Performed by: INTERNAL MEDICINE

## 2025-07-04 PROCEDURE — 85025 COMPLETE CBC W/AUTO DIFF WBC: CPT | Performed by: STUDENT IN AN ORGANIZED HEALTH CARE EDUCATION/TRAINING PROGRAM

## 2025-07-04 PROCEDURE — 63600175 PHARM REV CODE 636 W HCPCS: Performed by: PHYSICIAN ASSISTANT

## 2025-07-04 PROCEDURE — A4216 STERILE WATER/SALINE, 10 ML: HCPCS | Performed by: STUDENT IN AN ORGANIZED HEALTH CARE EDUCATION/TRAINING PROGRAM

## 2025-07-04 PROCEDURE — 87040 BLOOD CULTURE FOR BACTERIA: CPT | Performed by: STUDENT IN AN ORGANIZED HEALTH CARE EDUCATION/TRAINING PROGRAM

## 2025-07-04 PROCEDURE — 84443 ASSAY THYROID STIM HORMONE: CPT | Performed by: STUDENT IN AN ORGANIZED HEALTH CARE EDUCATION/TRAINING PROGRAM

## 2025-07-04 PROCEDURE — 94761 N-INVAS EAR/PLS OXIMETRY MLT: CPT

## 2025-07-04 PROCEDURE — 63600175 PHARM REV CODE 636 W HCPCS: Performed by: INTERNAL MEDICINE

## 2025-07-04 PROCEDURE — 80048 BASIC METABOLIC PNL TOTAL CA: CPT | Performed by: STUDENT IN AN ORGANIZED HEALTH CARE EDUCATION/TRAINING PROGRAM

## 2025-07-04 RX ORDER — PANTOPRAZOLE SODIUM 40 MG/10ML
40 INJECTION, POWDER, LYOPHILIZED, FOR SOLUTION INTRAVENOUS ONCE
Status: COMPLETED | OUTPATIENT
Start: 2025-07-05 | End: 2025-07-05

## 2025-07-04 RX ORDER — TRIPROLIDINE/PSEUDOEPHEDRINE 2.5MG-60MG
600 TABLET ORAL ONCE
Status: COMPLETED | OUTPATIENT
Start: 2025-07-05 | End: 2025-07-04

## 2025-07-04 RX ORDER — FAMOTIDINE 40 MG/5ML
20 POWDER, FOR SUSPENSION ORAL 2 TIMES DAILY
Status: DISCONTINUED | OUTPATIENT
Start: 2025-07-05 | End: 2025-07-09 | Stop reason: HOSPADM

## 2025-07-04 RX ORDER — MUPIROCIN 20 MG/G
OINTMENT TOPICAL 2 TIMES DAILY
Status: COMPLETED | OUTPATIENT
Start: 2025-07-04 | End: 2025-07-08

## 2025-07-04 RX ADMIN — VANCOMYCIN HYDROCHLORIDE 500 MG: 500 INJECTION, POWDER, LYOPHILIZED, FOR SOLUTION INTRAVENOUS at 07:07

## 2025-07-04 RX ADMIN — Medication 125 MG: at 05:07

## 2025-07-04 RX ADMIN — SODIUM CHLORIDE: 9 INJECTION, SOLUTION INTRAVENOUS at 01:07

## 2025-07-04 RX ADMIN — MUPIROCIN: 20 OINTMENT TOPICAL at 09:07

## 2025-07-04 RX ADMIN — IBUPROFEN 600 MG: 100 SUSPENSION ORAL at 11:07

## 2025-07-04 RX ADMIN — Medication 125 MG: at 12:07

## 2025-07-04 RX ADMIN — MUPIROCIN: 20 OINTMENT TOPICAL at 12:07

## 2025-07-04 RX ADMIN — ACETAMINOPHEN 1000 MG: 500 TABLET, FILM COATED ORAL at 05:07

## 2025-07-04 RX ADMIN — SODIUM CHLORIDE 500 ML: 9 INJECTION, SOLUTION INTRAVENOUS at 11:07

## 2025-07-04 RX ADMIN — PIPERACILLIN SODIUM AND TAZOBACTAM SODIUM 4.5 G: 4; .5 INJECTION, POWDER, FOR SOLUTION INTRAVENOUS at 03:07

## 2025-07-04 RX ADMIN — ACETAMINOPHEN 1000 MG: 500 TABLET, FILM COATED ORAL at 10:07

## 2025-07-04 RX ADMIN — Medication 125 MG: at 01:07

## 2025-07-04 RX ADMIN — LEVETIRACETAM 500 MG: 100 INJECTION INTRAVENOUS at 09:07

## 2025-07-04 RX ADMIN — MICAFUNGIN SODIUM 100 MG: 100 INJECTION, POWDER, LYOPHILIZED, FOR SOLUTION INTRAVENOUS at 09:07

## 2025-07-04 RX ADMIN — ACETAMINOPHEN 1000 MG: 500 TABLET, FILM COATED ORAL at 01:07

## 2025-07-04 RX ADMIN — MORPHINE SULFATE 2 MG: 4 INJECTION, SOLUTION INTRAMUSCULAR; INTRAVENOUS at 12:07

## 2025-07-04 RX ADMIN — SODIUM CHLORIDE, POTASSIUM CHLORIDE, SODIUM LACTATE AND CALCIUM CHLORIDE 1000 ML: 600; 310; 30; 20 INJECTION, SOLUTION INTRAVENOUS at 12:07

## 2025-07-04 RX ADMIN — ENOXAPARIN SODIUM 40 MG: 40 INJECTION SUBCUTANEOUS at 09:07

## 2025-07-04 NOTE — ASSESSMENT & PLAN NOTE
Patient is nonverbal, could not give history of UTI  Urine culture growing Candida glabrata, unsure if this is colonization  Currently on micafungin.  Awaiting final results from blood culture

## 2025-07-04 NOTE — EICU
Intervention Initiated From:  Bedside    Yara intervened regarding:  Other    Nurse Notified:  No    Doctor Notified:  Yes    Comments: Telephone call received from bedside RN. RN is receiving a sepsis alert and seeks clarification. Message relayed to Dr. Nicholson.

## 2025-07-04 NOTE — PROGRESS NOTES
[facility]  [place] Medicine  Progress Note    Patient Name: Lynda Mackenzie  MRN: [id number]  Patient Class: IP- Inpatient   Admission Date: 7/2/2025  Length of Stay: 2 days  Attending Physician: Isiah Rodriguez MD  Primary Care Provider: Rut Rosenthal MD        Subjective     Principal Problem:Severe sepsis        HPI:  Lynda Mackenzie 69 y.o. female with CHF, history of PE on eliquis, seizure disorder on keppra, previous CVA with residual aphasia and right sided hemiparesis, PEG status presents to the hospital from [facility] after witnessed hypoxia and increased work of breathing. The patient is unable to provide any history due to baseline aphasia.     Discussed with nurse from [facility] she reports this morning patient's respiratory rate was elevated near 30 and she was hypoxic requiring supplemental oxygen causing activation of EMS.  At baseline she is not on supplemental oxygen.  She is nonverbal at baseline.  She has been tolerating her tube feeds at 40 cc an hour continuous with 140cc every 4 hours FWF.  She completed her vancomycin from previous hospitalization for C diff colitis.  There has been no diarrhea at the nursing home.  There have been no witnessed episodes of emesis or seizures.  She has not had a fever.  She receives her Keppra and Eliquis via PEG.    Discussed with patient's daughter KIRK he will confirms DNR status.  She has baseline aphasia and right-sided hemiparesis from previous CVA. She is not on home oxygen.     In the ED, tachycardic near 140 white blood cell count 22 initial lactic acid 3.5 repeat 2.4 UA with white blood cells bacteria initial chest x-ray without acute process, repeat after 1.7 L of IVF with increased conspicuous lung markings.        Overview/Hospital Course:  She was admitted with temp 100F, tachycardic with heart rate in the 140-150, tachypneic with respiratory rate 24-28, and leukocytosis 23k in keeping with sepsis. Started on V  vancomycin, Cefepime and metronidazole.  C diff returned with positive antigen and toxin, extended treatment with oral vancomycin initiated, Urine culture growing >100k CFU's of yeast, Micafungin added for now unsure if this was colonization.  Heart rate not improving with volume resuscitation.  Blood culture with GPC in one bottle, may be contaminant but will keep IV vanc on until it is determined. Does have some discharge around her PEG, unclear if fungal or food discharge, will deescalate with culture results.  Awaiting TSH results.    Interval History:  C diff positive antigen and toxin, improving leukocytosis, patient is nonverbal unable to corroborate history.    Review of Systems   Reason unable to perform ROS: Nonverbal due to previous CVA.     Objective:     Vital Signs (Most Recent):  Temp: 99.2 °F (37.3 °C) (07/04/25 1400)  Pulse: (!) 137 (07/04/25 1400)  Resp: (!) 23 (07/04/25 1400)  BP: (!) 142/70 (07/04/25 1202)  SpO2: 96 % (07/04/25 1400) Vital Signs (24h Range):  Temp:  [97.2 °F (36.2 °C)-99.2 °F (37.3 °C)] 99.2 °F (37.3 °C)  Pulse:  [109-137] 137  Resp:  [17-35] 23  SpO2:  [93 %-99 %] 96 %  BP: ()/(44-75) 142/70     Weight: 39 kg (85 lb 15.7 oz)  Body mass index is 16.79 kg/m².    Intake/Output Summary (Last 24 hours) at 7/4/2025 1433  Last data filed at 7/4/2025 1400  Gross per 24 hour   Intake 4365.55 ml   Output 1052 ml   Net 3313.55 ml         Physical Exam  Constitutional:       General: She is not in acute distress.     Appearance: She is normal weight. She is ill-appearing. She is not toxic-appearing or diaphoretic.   HENT:      Head: Normocephalic and atraumatic.      Nose: Nose normal. No congestion or rhinorrhea.      Mouth/Throat:      Mouth: Mucous membranes are dry.   Eyes:      General: No scleral icterus.     Extraocular Movements: Extraocular movements intact.      Conjunctiva/sclera: Conjunctivae normal.      Pupils: Pupils are equal, round, and reactive to light.    Cardiovascular:      Rate and Rhythm: Regular rhythm. Tachycardia present.      Pulses: Normal pulses.      Heart sounds: Normal heart sounds. No murmur heard.  Pulmonary:      Effort: Pulmonary effort is normal. No respiratory distress.      Breath sounds: Normal breath sounds. No wheezing, rhonchi or rales.   Chest:      Chest wall: No tenderness.   Abdominal:      General: Abdomen is flat. Bowel sounds are normal. There is no distension.      Palpations: Abdomen is soft.      Tenderness: There is no abdominal tenderness. There is no guarding or rebound.   Musculoskeletal:      Cervical back: Normal range of motion and neck supple. No rigidity.      Right lower leg: No edema.      Left lower leg: No edema.   Skin:     General: Skin is warm and dry.      Coloration: Skin is not jaundiced.      Findings: No lesion.   Neurological:      Mental Status: She is alert. Mental status is at baseline.      Cranial Nerves: No cranial nerve deficit.      Sensory: No sensory deficit.      Motor: Weakness present.   Psychiatric:         Mood and Affect: Mood normal.               Significant Labs: All pertinent labs within the past 24 hours have been reviewed.    Significant Imaging: I have reviewed all pertinent imaging results/findings within the past 24 hours.  Imaging Results              CTA Chest Non-Coronary (PE Studies) (Final result)  Result time 07/02/25 15:14:10      Final result by Jose Alston MD (07/02/25 15:14:10)                   Impression:      1. No pulmonary thromboembolism.  2. Scattered ground-glass attenuation, findings may reflect edema or other pneumonitis.  Short-term follow-up, no greater than 3 months, is recommended to ensure resolution.  3. Please see above for additional findings.      Electronically signed by: Jose Alston MD  Date:    07/02/2025  Time:    15:14               Narrative:    EXAMINATION:  CTA CHEST NON CORONARY (PE STUDIES)    CLINICAL HISTORY:  Pulmonary embolism (PE)  suspected, unknown D-dimer;    TECHNIQUE:  Low dose axial images, sagittal and coronal reformations were obtained from the thoracic inlet to the lung bases following the IV administration of 70 mL of Omnipaque 350.  Contrast timing was optimized to evaluate the pulmonary arteries.  MIP images were performed.    COMPARISON:  Radiograph 07/02/2025    FINDINGS:  The structures at the base of the neck are unremarkable.  No significant mediastinal lymphadenopathy.  The thoracic aorta tapers normally noting atherosclerotic plaque and calcification along its course and in the distribution of the coronary arteries.  The visualized portions of the kidneys, adrenal glands, spleen and liver are grossly unremarkable.    Allowing for motion artifact, the airways are patent centrally.  There is high attenuating material within few airways to the right upper lobe, uncertain significance.  There is scattered interlobular septal thickening.  There are multiple scattered regions of ground-glass attenuation particularly in a perihilar distribution, may reflect edema or other developing pneumonitis.  No large focal consolidation.  There is bilateral basilar dependent atelectasis.  No pneumothorax.  No pleural effusion.    Bolus timing is adequate for evaluation of pulmonary thromboembolism.  No convincing pulmonary arterial filling defect to the level of the proximal segmental branches bilaterally to suggest pulmonary thromboembolism.    There is osteopenia.  There are degenerative changes of the spine.  No significant axillary lymphadenopathy.                                       X-Ray Chest 1 View (Final result)  Result time 07/02/25 13:47:16      Final result by Jose Alston MD (07/02/25 13:47:16)                   Impression:      As above      Electronically signed by: Jose Alston MD  Date:    07/02/2025  Time:    13:47               Narrative:    EXAMINATION:  XR CHEST 1 VIEW    CLINICAL HISTORY:  Shortness of  breath    TECHNIQUE:  Single frontal view of the chest was performed.    COMPARISON:  07/02/2025    FINDINGS:  There is no pneumothorax.  Coarse interstitial attenuation is more conspicuous than on the previous exam, developing edema or infection is a consideration.  There is no pneumothorax or other significant detrimental change since the previous exam.                                       X-Ray Chest AP Portable (Final result)  Result time 07/02/25 11:03:14      Final result by Rakesh Mistry MD (07/02/25 11:03:14)                   Impression:      No acute radiographic findings in the chest on this single view.      Electronically signed by: Rakesh Mistry MD  Date:    07/02/2025  Time:    11:03               Narrative:    EXAMINATION:  XR CHEST AP PORTABLE    CLINICAL HISTORY:  Sepsis;    TECHNIQUE:  Single frontal view of the chest was performed.    COMPARISON:  None.    FINDINGS:  Cardiac monitoring leads project over the bilateral hemithoraces.  Mediastinal structures are midline. Hilar contours are unremarkable.  Cardiac silhouette is normal in size. Lung volumes are normal and symmetric.  No consolidation.  No pneumothorax or pleural effusion.  No free air beneath the diaphragm. No acute osseous abnormalities.  Degenerative changes of the spine and shoulders.                                          Assessment & Plan  Severe sepsis  Presents with hypoxia, tachycardia, and WBC of 22 from [facility]. Remains tachycardic in the ED despite IVF, and remains on NC. With sacral decubitus without surrounding cellulitis or drainage. Possible source includes urine. Was tachycardic persistently at [facility] during last hospitalization.  Continue oral and intravenous vancomycin, in the setting of C diff and Gram-positive cocci  Blood cultures pending  Urine culture growing Candida glabrata  Stage IV sacral wound could be source of infection as well.    Patient has sepsis with Acute respiratory failure secondary to  Urinary Tract Infection.   A review of systems was completed. Patient's sepsis is stable    Current Antibiotics    , 4 times daily, Per G Tube  vancomycin - pharmacy to dose, pharmacy to manage frequency, Intravenous  vancomycin 125 mg/5 mL oral solution 125 mg, Every 6 hours, Per G Tube  vancomycin 125 mg/5 mL oral solution 125 mg, Every 12 hours, Per G Tube  vancomycin 125 mg/5 mL oral solution 125 mg, Daily, Per G Tube  vancomycin 125 mg/5 mL oral solution 125 mg, Every 3 days, Per G Tube  piperacillin-tazobactam (ZOSYN) 4.5 g in D5W 100 mL IVPB (MB+), Every 8 hours (non-standard times), Intravenous    Lactate  Recent Labs   Lab 07/02/25  0852 07/02/25  0901 07/02/25  0906 07/02/25  1133 07/03/25 2021   LACTATE 3.5*  --   --  2.4* 0.9   POCLAC  --  UNAVAILABLE 4.08*  --   --      Culture Data  Blood Cultures   Blood Culture   Date Value Ref Range Status   07/04/2025 No Growth After 6 Hours  Preliminary   07/02/2025 Positive - Aerobic/Pediatric Bottle (A)  Preliminary   07/02/2025 Coagulase-negative Staphylococcus species (A)  Preliminary   07/02/2025 No Growth After 48 Hours  Preliminary      Urine Culture   Urine Culture   Date Value Ref Range Status   07/02/2025 >100,000 cfu/ml Candida glabrata (A)  Final     Comment:     Treatment of asymptomatic candiduria is not recommended (except for specific populations). Candida isolated in the urine typically represents colonization. If an indwelling urinary catheter is presentit should be removed or replaced.      mSOFA  MSOFA Total  Min: 0   Min taken time: 07/04/25 1401  Max: 1   Max taken time: 07/03/25 2201    Plan  - Antibiotics as listed above  - Fluid resuscitation as follows:Contraindicated- Fluid bolus is contraindicated in this patient due to Congestive Heart Failure   - Trend lactate to resolution  - Follow up culture data  - Vasopressors were initiated to maintain MAP >65 due to persistent hypotension after appropriate fluid administration  - The following  "services were consulted:Palliative Medicine.    Global aphasia  Non-verbal at baseline.   Currently on tube feedings.   Aspiration precaution.    Hemiparesis of right dominant side as late effect of cerebral infarction  Per care everywhere.   Aphasic at baseline per NH  Confirmed with daughter with residual right sided hemiparesis from previous CVA  Hypertension  Patient's blood pressure range in the last 24 hours was: BP  Min: 88/44  Max: 142/70.The patient's inpatient anti-hypertensive regimen is listed below:  Current Antihypertensives  , 2 times daily, FEEDING TUBE    Plan  - BP is controlled, no changes needed to their regimen  - home metoprolol/entresto held for cocnern for sepsis  History of CVA (cerebrovascular accident)  With residual non-verbal status and PEG dependent per discussion with NH    Chronic combined systolic and diastolic CHF (congestive heart failure)  Patient has Combined Systolic and Diastolic heart failure that is Chronic. On presentation their CHF was well compensated. Most recent BNP and echo results are listed below.  No results for input(s): "BNP" in the last 72 hours.  Latest ECHO  No results found for this or any previous visit.    Current Heart Failure Medications  , Daily, Per G Tube  , 2 times daily, FEEDING TUBE    Plan  - Monitor strict I&Os and daily weights.    - Place on telemetry  - Low sodium diet  - Place on fluid restriction of 1.5 L.   - Cardiology has not been consulted  - The patient's volume status is at their baseline  - home metoprolol and entresto held for sepsis.     Echo in care everywhere with EF of 35 to 40% and grade 1 DD.           PEG (percutaneous endoscopic gastrostomy) status  Patient noted to have a percutaneous endoscopic gastrostomy tube in place. I have personally inspected the tube.Tube was placed prior to this admission   There are no signs of drainage or infection around the site.   The tube is patent. Medications have converted to liquid form if " available.    Routine care to be done by wound care and nursing staff.     On Jevity 1.5 continuous per [facility] at 40 cc/hr with 120 FWF every 4. Nutrition consulted  Will hold resuming tube feedings today given receieved almost 2L of IVF in ED with CHF and repeat x-ray with increased lung markings    Advanced care planning/counseling discussion  Advance Care Planning     Date: 07/02/2025    Code Status  In light of the patients advanced and life limiting illness,I engaged the the family and healthcare power of   in a voluntary conversation about the patient's preferences for care  at the very end of life. The patient wishes to have a natural, peaceful death.  Along those lines, the patient does not wish to have CPR or other invasive treatments performed when her heart and/or breathing stops. I communicated to the family and healthcare power of   that a DNR order would be placed in her medical record to reflect this preference.    A total of 16 min was spent on advance care planning, goals of care discussion, emotional support, formulating and communicating prognosis and exploring burden/benefit of various approaches of treatment. This discussion occurred on a fully voluntary basis with the verbal consent of the patient and/or family.     Discussed with KIRK who is also director of nursing at NH where patient resides. Confirmed DNR status. Palliative care consulted       Seizures  Convert home keppra to IV while hospitalized  Cefepime has been discontinued.  Avoid tramadol and other seizure lowering meds  History of Clostridioides difficile colitis  Diagnosed 6/2025.   Confirmed with NH completed, vanc and no diarrhea prior to arrival  History of pulmonary embolism  Currently on Lovenox  Repeat CTA given  hypoxia tachycardia persistent despite IVF.   Repeat CT PE protocol showed no new pulmonary emboli  Confirmed with NH has been receiving apixaban  Severe protein-calorie malnutrition  Nutrition  consulted. Most recent weight and BMI monitored-     Measurements:  Wt Readings from Last 1 Encounters:   07/04/25 48 kg (105 lb 13.1 oz)   Body mass index is 20.67 kg/m².    Patient has been screened and assessed by RD.    Malnutrition Type:  Context:    Level:      Malnutrition Characteristic Summary:       Interventions/Recommendations (treatment strategy):  1. Recommend initiation of EN regimen of Isosource 1.5 at 10 mL/hr and advance as tolerated to goal rate of 45 mL/hr to provide 1620 kcal, 73 gm pro, and 825 mL free water. Additional FWF per MD. 2. Addition of Bradley BID to promote wound healing. Mix 1 packet of Bradley in  mL water, dissolve well and administer via PEG. Addition of 30-60 mL FWF before and after administration. 3. Monitor weight/labs. 4. RD to follow and monitor nutrition status    Open wound of skin  Wound care consult for wound dressing.  Multiple ulcers, worse-stage IV sacral wound  Acute cystitis without hematuria  Patient is nonverbal, could not give history of UTI  Urine culture growing Candida glabrata, unsure if this is colonization  Currently on micafungin.  Awaiting final results from blood culture  VTE Risk Mitigation (From admission, onward)           Ordered     enoxaparin injection 40 mg  Every 24 hours         07/02/25 1425     IP VTE HIGH RISK PATIENT  Once         07/02/25 1344     Place sequential compression device  Until discontinued         07/02/25 1344     Place DEBBY hose  Until discontinued         07/02/25 1344                    Discharge Planning   LORENA:      Code Status: DNR   Medical Readiness for Discharge Date:   Discharge Plan A: Skilled Nursing Facility              Critical care time spent on the evaluation and treatment of severe organ dysfunction, review of pertinent labs and imaging studies, discussions with consulting providers and discussions with patient/family: 35 minutes.          Isiah Rodriguez MD  Department of Hospital Medicine   [facility] -  Intensive Care

## 2025-07-04 NOTE — ASSESSMENT & PLAN NOTE
Group Topic:  Group OT    Date: 4/14/2021  Start Time:  1:15 PM  End Time:  2:00 PM  Facilitators: Chela Zheng OT    Focus: Activity: Mind/Body Practices  Number in attendance: 6  Overview of the principles of mind/body practices and their benefits.  Pt guided through gentle mindful movement with breath awareness to encourage positive coping skills for overall wellness and self regulation.    Method: Group and Audio/Visual  Attendance: Present  Participation: Active  Patient Response: Indifferent  Mood: Depressed  Affect: Type: Depressed and Euthymic (normal mood)   Range: Blunted/flat   Congruency: Congruent   Stability: Stable  Behavior/Socialization: Cooperative and Indifferent  Thought Process: Focused  Task Performance: Follows directions  Patient Evaluation: Independent - full participation   Pt engaged in the gentle movement and appeared somewhat indifferent to activity.  Pt was cooperative but seemed somewhat distracted.   Patient's blood pressure range in the last 24 hours was: BP  Min: 88/44  Max: 142/70.The patient's inpatient anti-hypertensive regimen is listed below:  Current Antihypertensives  , 2 times daily, FEEDING TUBE    Plan  - BP is controlled, no changes needed to their regimen  - home metoprolol/entresto held for cocnern for sepsis

## 2025-07-04 NOTE — ASSESSMENT & PLAN NOTE
Currently on Lovenox  Repeat CTA given  hypoxia tachycardia persistent despite IVF.   Repeat CT PE protocol showed no new pulmonary emboli  Confirmed with NH has been receiving apixaban

## 2025-07-04 NOTE — ASSESSMENT & PLAN NOTE
Presents with hypoxia, tachycardia, and WBC of 22 from [facility]. Remains tachycardic in the ED despite IVF, and remains on NC. With sacral decubitus without surrounding cellulitis or drainage. Possible source includes urine. Was tachycardic persistently at  during last hospitalization.  Continue oral and intravenous vancomycin, in the setting of C diff and Gram-positive cocci  Blood cultures pending  Urine culture growing Candida glabrata  Stage IV sacral wound could be source of infection as well.    Patient has sepsis with Acute respiratory failure secondary to Urinary Tract Infection.   A review of systems was completed. Patient's sepsis is stable    Current Antibiotics    , 4 times daily, Per G Tube  vancomycin - pharmacy to dose, pharmacy to manage frequency, Intravenous  vancomycin 125 mg/5 mL oral solution 125 mg, Every 6 hours, Per G Tube  vancomycin 125 mg/5 mL oral solution 125 mg, Every 12 hours, Per G Tube  vancomycin 125 mg/5 mL oral solution 125 mg, Daily, Per G Tube  vancomycin 125 mg/5 mL oral solution 125 mg, Every 3 days, Per G Tube  piperacillin-tazobactam (ZOSYN) 4.5 g in D5W 100 mL IVPB (MB+), Every 8 hours (non-standard times), Intravenous    Lactate  Recent Labs   Lab 07/02/25  0852 07/02/25  0901 07/02/25  0906 07/02/25  1133 07/03/25 2021   LACTATE 3.5*  --   --  2.4* 0.9   POCLAC  --  UNAVAILABLE 4.08*  --   --      Culture Data  Blood Cultures   Blood Culture   Date Value Ref Range Status   07/04/2025 No Growth After 6 Hours  Preliminary   07/02/2025 Positive - Aerobic/Pediatric Bottle (A)  Preliminary   07/02/2025 Coagulase-negative Staphylococcus species (A)  Preliminary   07/02/2025 No Growth After 48 Hours  Preliminary      Urine Culture   Urine Culture   Date Value Ref Range Status   07/02/2025 >100,000 cfu/ml Candida glabrata (A)  Final     Comment:     Treatment of asymptomatic candiduria is not recommended (except for specific populations). Candida isolated in the urine  typically represents colonization. If an indwelling urinary catheter is presentit should be removed or replaced.      mSOFA  MSOFA Total  Min: 0   Min taken time: 07/04/25 1401  Max: 1   Max taken time: 07/03/25 2201    Plan  - Antibiotics as listed above  - Fluid resuscitation as follows:Contraindicated- Fluid bolus is contraindicated in this patient due to Congestive Heart Failure   - Trend lactate to resolution  - Follow up culture data  - Vasopressors were initiated to maintain MAP >65 due to persistent hypotension after appropriate fluid administration  - The following services were consulted:Palliative Medicine.

## 2025-07-04 NOTE — EICU
eICU Physician Virtual/Remote Brief Evaluation Note      Lactate resulted  Now normal at 0.9   Continue current treatment      ANTONI Nicholson MD  [facility] Attending  [phone]    This report has been created through the use of [employer] dictation software. Typographical and content errors may occur with this process. While efforts are made to detect and correct such errors, in some cases errors will persist. For this reason, wording in this document should be considered in the proper context and not strictly verbatim    none

## 2025-07-04 NOTE — ASSESSMENT & PLAN NOTE
Nutrition consulted. Most recent weight and BMI monitored-     Measurements:  Wt Readings from Last 1 Encounters:   07/04/25 48 kg (105 lb 13.1 oz)   Body mass index is 20.67 kg/m².    Patient has been screened and assessed by RD.    Malnutrition Type:  Context:    Level:      Malnutrition Characteristic Summary:       Interventions/Recommendations (treatment strategy):  1. Recommend initiation of EN regimen of Isosource 1.5 at 10 mL/hr and advance as tolerated to goal rate of 45 mL/hr to provide 1620 kcal, 73 gm pro, and 825 mL free water. Additional FWF per MD. 2. Addition of Bradley BID to promote wound healing. Mix 1 packet of Bradley in  mL water, dissolve well and administer via PEG. Addition of 30-60 mL FWF before and after administration. 3. Monitor weight/labs. 4. RD to follow and monitor nutrition status

## 2025-07-04 NOTE — PROGRESS NOTES
Pharmacokinetic Assessment Follow Up: IV Vancomycin    Vancomycin serum concentration assessment(s):    The random level was drawn correctly and can be used to guide therapy at this time. The measurement is within the desired definitive target range of 10 to 20 mcg/mL.    Vancomycin Regimen Plan:    Re-dose when the random level is less than 20 mcg/mL, next level to be drawn at 0300 on 7/5/25    Drug levels (last 3 results):  Recent Labs   Lab Result Units 07/03/25  0313 07/04/25  0455   Vancomycin Random ug/ml 8.6 10.7       Pharmacy will continue to follow and monitor vancomycin.    Please contact pharmacy at extension [phone] for questions regarding this assessment.    Thank you for the consult,   Ezra Moser       Patient brief summary:  Lynda Mackenzie is a 69 y.o. female initiated on antimicrobial therapy with IV Vancomycin for treatment of bacteremia    The patient's current regimen is random pulse dosing    Drug Allergies:   Review of patient's allergies indicates:  No Known Allergies    Actual Body Weight:   39 kg    Renal Function:   Estimated Creatinine Clearance: 40.9 mL/min (based on SCr of 0.8 mg/dL).,     Dialysis Method (if applicable):  N/A    CBC (last 72 hours):  Recent Labs   Lab Result Units 07/02/25  0852 07/03/25  0313   WBC K/uL 22.59* 18.27*   HGB gm/dL 10.6* 9.2*   HCT % 33.4* 28.9*   Platelet Count K/uL 571* 475*   Lymph % % 20.9 11.4*   Mono % % 9.3 5.5   Eos % % 0.4 0.7   Basophil % % 0.7 0.4       Metabolic Panel (last 72 hours):  Recent Labs   Lab Result Units 07/02/25  0852 07/02/25  1044 07/03/25  0313   Sodium mmol/L 139  --  142   Potassium mmol/L 4.8  --  3.9   Chloride mmol/L 97  --  102   CO2 mmol/L 25  --  26   Glucose mg/dL 137*  --  96   Glucose, UA   --  3+*  --    BUN mg/dL 40*  --  28*   Creatinine mg/dL 1.2  --  0.8   Albumin g/dL 2.3*  --  2.1*   Bilirubin Total mg/dL 0.3  --  0.3   ALP unit/L 100  --  89   AST unit/L 137*  --  148*   ALT unit/L 35  --  33        Vancomycin Administrations:  vancomycin given in the last 96 hours                     vancomycin 125 mg/5 mL oral solution 125 mg (mg) 125 mg Given 07/04/25 0526     125 mg Given  0100     125 mg Given 07/03/25 1744     125 mg Given  1313    vancomycin 750 mg in 0.9% NaCl 250 mL IVPB (admixture device) (mg) 750 mg New Bag 07/03/25 0619    vancomycin 750 mg in 0.9% NaCl 250 mL IVPB (admixture device) (mg) 750 mg New Bag 07/02/25 0945                    Microbiologic Results:  Microbiology Results (last 7 days)       Procedure Component Value Units Date/Time    Blood culture [[id number]] Collected: 07/04/25 0455    Order Status: Resulted Specimen: Blood Updated: 07/04/25 0510    Blood culture x two cultures. Draw prior to antibiotics. [[id number]]  (Normal) Collected: 07/02/25 0852    Order Status: Completed Specimen: Blood from Peripheral, Antecubital, Right Updated: 07/03/25 2201     Blood Culture No Growth After 36 Hours    Clostridium difficile EIA [[id number]]  (Abnormal) Collected: 07/02/25 2333    Order Status: Completed Specimen: Stool Updated: 07/03/25 1144     C. DIFFICILE GDH AG Positive     Clostridioides difficile Toxin A/B Positive    Respiratory Infection Panel (PCR), Nasopharyngeal [[id number]] Collected: 07/02/25 1829    Order Status: Completed Specimen: Nasopharyngeal Swab Updated: 07/03/25 1119     Respiratory Infection Panel Source Nasopharyngeal Swab     Adenovirus Not Detected     Coronavirus 229E, Common Cold Virus Not Detected     Coronavirus HKU1, Common Cold Virus Not Detected     Coronavirus NL63, Common Cold Virus Not Detected     Coronavirus OC43, Common Cold Virus Not Detected     SARS-CoV2 (COVID-19) Qualitative PCR Not Detected     Human Metapneumovirus Not Detected     Human Rhinovirus/Enterovirus Not Detected     Influenza A Not Detected     Influenza B Not Detected     Parainfluenza Virus 1 Not Detected     Parainfluenza Virus 2 Not Detected     Parainfluenza Virus 3 Not  Detected     Parainfluenza Virus 4 Not Detected     Respiratory Syncytial Virus Not Detected     Bordetella Parapertussis (MP9822) Not Detected     Bordetella pertussis (ptxP) Not Detected     Chlamydia pneumoniae Not Detected     Mycoplasma pneumoniae Not Detected    Blood culture x two cultures. Draw prior to antibiotics. [[id number]]  (Abnormal) Collected: 07/02/25 0917    Order Status: Completed Specimen: Blood from Peripheral, Forearm, Left Updated: 07/03/25 1002     Blood Culture Positive - Aerobic/Pediatric Bottle     GRAM STAIN Gram positive cocci in clusters resembling Staph     Comment: Positive - Aerobic/Pediatric Bottle       Urine culture [[id number]]  (Abnormal) Collected: 07/02/25 1044    Order Status: Completed Specimen: Urine, Catheterized Updated: 07/03/25 0722     Urine Culture >100,000 cfu/ml Yeast species    Influenza A & B by Molecular [[id number]]  (Normal) Collected: 07/02/25 1829    Order Status: Completed Specimen: Nasal Swab Updated: 07/02/25 1928     INFLUENZA A MOLECULAR Negative     INFLUENZA B MOLECULAR  Negative

## 2025-07-04 NOTE — NURSING
[facility], [facility] - [place]  Nurses Note -- 4 Eyes      7/4/2025       Skin assessed on: Q Shift      [] No Pressure Injuries Present    []Prevention Measures Documented    [x] Yes LDA  for Pressure Injury Previously documented     [] Yes New Pressure Injury Discovered   [] LDA for New Pressure Injury Added      Attending RN:  Fabien Banks RN     Second RN:  VALE Ramesh RN

## 2025-07-04 NOTE — ASSESSMENT & PLAN NOTE
Non-verbal at baseline.   Currently on tube feedings.   Aspiration precaution.     details No chest wall deformities/Normal respiratory pattern/Symmetric breath sounds clear to auscultation and percussion

## 2025-07-04 NOTE — SUBJECTIVE & OBJECTIVE
Interval History:  C diff positive antigen and toxin, improving leukocytosis, patient is nonverbal unable to corroborate history.    Review of Systems   Reason unable to perform ROS: Nonverbal due to previous CVA.     Objective:     Vital Signs (Most Recent):  Temp: 99.2 °F (37.3 °C) (07/04/25 1400)  Pulse: (!) 137 (07/04/25 1400)  Resp: (!) 23 (07/04/25 1400)  BP: (!) 142/70 (07/04/25 1202)  SpO2: 96 % (07/04/25 1400) Vital Signs (24h Range):  Temp:  [97.2 °F (36.2 °C)-99.2 °F (37.3 °C)] 99.2 °F (37.3 °C)  Pulse:  [109-137] 137  Resp:  [17-35] 23  SpO2:  [93 %-99 %] 96 %  BP: ()/(44-75) 142/70     Weight: 39 kg (85 lb 15.7 oz)  Body mass index is 16.79 kg/m².    Intake/Output Summary (Last 24 hours) at 7/4/2025 1433  Last data filed at 7/4/2025 1400  Gross per 24 hour   Intake 4365.55 ml   Output 1052 ml   Net 3313.55 ml         Physical Exam  Constitutional:       General: She is not in acute distress.     Appearance: She is normal weight. She is ill-appearing. She is not toxic-appearing or diaphoretic.   HENT:      Head: Normocephalic and atraumatic.      Nose: Nose normal. No congestion or rhinorrhea.      Mouth/Throat:      Mouth: Mucous membranes are dry.   Eyes:      General: No scleral icterus.     Extraocular Movements: Extraocular movements intact.      Conjunctiva/sclera: Conjunctivae normal.      Pupils: Pupils are equal, round, and reactive to light.   Cardiovascular:      Rate and Rhythm: Regular rhythm. Tachycardia present.      Pulses: Normal pulses.      Heart sounds: Normal heart sounds. No murmur heard.  Pulmonary:      Effort: Pulmonary effort is normal. No respiratory distress.      Breath sounds: Normal breath sounds. No wheezing, rhonchi or rales.   Chest:      Chest wall: No tenderness.   Abdominal:      General: Abdomen is flat. Bowel sounds are normal. There is no distension.      Palpations: Abdomen is soft.      Tenderness: There is no abdominal tenderness. There is no guarding or  rebound.   Musculoskeletal:      Cervical back: Normal range of motion and neck supple. No rigidity.      Right lower leg: No edema.      Left lower leg: No edema.   Skin:     General: Skin is warm and dry.      Coloration: Skin is not jaundiced.      Findings: No lesion.   Neurological:      Mental Status: She is alert. Mental status is at baseline.      Cranial Nerves: No cranial nerve deficit.      Sensory: No sensory deficit.      Motor: Weakness present.   Psychiatric:         Mood and Affect: Mood normal.               Significant Labs: All pertinent labs within the past 24 hours have been reviewed.    Significant Imaging: I have reviewed all pertinent imaging results/findings within the past 24 hours.  Imaging Results              CTA Chest Non-Coronary (PE Studies) (Final result)  Result time 07/02/25 15:14:10      Final result by Jose Alston MD (07/02/25 15:14:10)                   Impression:      1. No pulmonary thromboembolism.  2. Scattered ground-glass attenuation, findings may reflect edema or other pneumonitis.  Short-term follow-up, no greater than 3 months, is recommended to ensure resolution.  3. Please see above for additional findings.      Electronically signed by: Jose Alston MD  Date:    07/02/2025  Time:    15:14               Narrative:    EXAMINATION:  CTA CHEST NON CORONARY (PE STUDIES)    CLINICAL HISTORY:  Pulmonary embolism (PE) suspected, unknown D-dimer;    TECHNIQUE:  Low dose axial images, sagittal and coronal reformations were obtained from the thoracic inlet to the lung bases following the IV administration of 70 mL of Omnipaque 350.  Contrast timing was optimized to evaluate the pulmonary arteries.  MIP images were performed.    COMPARISON:  Radiograph 07/02/2025    FINDINGS:  The structures at the base of the neck are unremarkable.  No significant mediastinal lymphadenopathy.  The thoracic aorta tapers normally noting atherosclerotic plaque and calcification along  its course and in the distribution of the coronary arteries.  The visualized portions of the kidneys, adrenal glands, spleen and liver are grossly unremarkable.    Allowing for motion artifact, the airways are patent centrally.  There is high attenuating material within few airways to the right upper lobe, uncertain significance.  There is scattered interlobular septal thickening.  There are multiple scattered regions of ground-glass attenuation particularly in a perihilar distribution, may reflect edema or other developing pneumonitis.  No large focal consolidation.  There is bilateral basilar dependent atelectasis.  No pneumothorax.  No pleural effusion.    Bolus timing is adequate for evaluation of pulmonary thromboembolism.  No convincing pulmonary arterial filling defect to the level of the proximal segmental branches bilaterally to suggest pulmonary thromboembolism.    There is osteopenia.  There are degenerative changes of the spine.  No significant axillary lymphadenopathy.                                       X-Ray Chest 1 View (Final result)  Result time 07/02/25 13:47:16      Final result by Jose Alston MD (07/02/25 13:47:16)                   Impression:      As above      Electronically signed by: Jose Alston MD  Date:    07/02/2025  Time:    13:47               Narrative:    EXAMINATION:  XR CHEST 1 VIEW    CLINICAL HISTORY:  Shortness of breath    TECHNIQUE:  Single frontal view of the chest was performed.    COMPARISON:  07/02/2025    FINDINGS:  There is no pneumothorax.  Coarse interstitial attenuation is more conspicuous than on the previous exam, developing edema or infection is a consideration.  There is no pneumothorax or other significant detrimental change since the previous exam.                                       X-Ray Chest AP Portable (Final result)  Result time 07/02/25 11:03:14      Final result by Rakesh Mistry MD (07/02/25 11:03:14)                   Impression:       No acute radiographic findings in the chest on this single view.      Electronically signed by: Rakesh Mistry MD  Date:    07/02/2025  Time:    11:03               Narrative:    EXAMINATION:  XR CHEST AP PORTABLE    CLINICAL HISTORY:  Sepsis;    TECHNIQUE:  Single frontal view of the chest was performed.    COMPARISON:  None.    FINDINGS:  Cardiac monitoring leads project over the bilateral hemithoraces.  Mediastinal structures are midline. Hilar contours are unremarkable.  Cardiac silhouette is normal in size. Lung volumes are normal and symmetric.  No consolidation.  No pneumothorax or pleural effusion.  No free air beneath the diaphragm. No acute osseous abnormalities.  Degenerative changes of the spine and shoulders.

## 2025-07-04 NOTE — EICU
Intervention Initiated From:  COR / DEU    Yara intervened regarding:  Rounding (Video assessment)    VICU Night Rounds Checklist  24H Vital Sign Range:  Temp:  [97.2 °F (36.2 °C)-100.1 °F (37.8 °C)]   Pulse:  [109-153]   Resp:  [14-47]   BP: ()/(43-73)   SpO2:  [80 %-100 %]     Video rounds and LDA reconciliation

## 2025-07-04 NOTE — ASSESSMENT & PLAN NOTE
Convert home keppra to IV while hospitalized  Cefepime has been discontinued.  Avoid tramadol and other seizure lowering meds

## 2025-07-04 NOTE — NURSING
[facility], [facility] - [place]  Nurses Note -- 4 Eyes      7/3/2025       Skin assessed on: Q Shift      [] No Pressure Injuries Present    []Prevention Measures Documented    [x] Yes LDA  for Pressure Injury Previously documented     [] Yes New Pressure Injury Discovered   [] LDA for New Pressure Injury Added      Attending RN:  SHERIF Pinedo     Second RN:  SHERIF Chisholm

## 2025-07-04 NOTE — EICU
eICU Physician Virtual/Remote Brief Evaluation Note      Message from bedside RN   Sepsis similar triggering  RN asking if I will ordered lactic acid that the alert is triggering   Patient with positive blood cultures and C diff, is receiving fluids and vancomycin   Chart reviewed  /53 (75), P 120, RR 21, O2 sat 97   On vancomycin IV and p.o. and micafungin IV  Previous lactate 2.4 and 3.5  Can not receive fluid boluses due to history of CHF  Lactic acid ordered        ANTONI Nicholson MD  eICU Attending  [phone]    This report has been created through the use of [employer] dictation software. Typographical and content errors may occur with this process. While efforts are made to detect and correct such errors, in some cases errors will persist. For this reason, wording in this document should be considered in the proper context and not strictly verbatim

## 2025-07-04 NOTE — ASSESSMENT & PLAN NOTE
Per care everywhere.   Aphasic at baseline per NH  Confirmed with daughter with residual right sided hemiparesis from previous CVA

## 2025-07-04 NOTE — PLAN OF CARE
Pt remains on contact precautions r/t c. Diff. Multiple bowel movements throughout shift. Wound care performed. TF increased to 30 ml/hr. Weaned to RA. IVF at 200 ml/hr with minimum urine output.   Problem: Skin Injury Risk Increased  Goal: Skin Health and Integrity  Outcome: Progressing     Problem: Adult Inpatient Plan of Care  Goal: Plan of Care Review  Outcome: Progressing  Goal: Patient-Specific Goal (Individualized)  Outcome: Progressing  Goal: Absence of Hospital-Acquired Illness or Injury  Outcome: Progressing  Goal: Optimal Comfort and Wellbeing  Outcome: Progressing  Goal: Readiness for Transition of Care  Outcome: Progressing     Problem: Coping Ineffective  Goal: Effective Coping  Outcome: Progressing     Problem: Wound  Goal: Optimal Coping  Outcome: Progressing  Goal: Optimal Functional Ability  Outcome: Progressing  Goal: Absence of Infection Signs and Symptoms  Outcome: Progressing  Goal: Improved Oral Intake  Outcome: Progressing  Goal: Optimal Pain Control and Function  Outcome: Progressing  Goal: Skin Health and Integrity  Outcome: Progressing  Goal: Optimal Wound Healing  Outcome: Progressing     Problem: Sepsis/Septic Shock  Goal: Optimal Coping  Outcome: Progressing  Goal: Absence of Bleeding  Outcome: Progressing  Goal: Blood Glucose Level Within Targeted Range  Outcome: Progressing  Goal: Absence of Infection Signs and Symptoms  Outcome: Progressing  Goal: Optimal Nutrition Intake  Outcome: Progressing     Problem: Infection  Goal: Absence of Infection Signs and Symptoms  Outcome: Progressing     Problem: Fall Injury Risk  Goal: Absence of Fall and Fall-Related Injury  Outcome: Progressing

## 2025-07-05 LAB
ANION GAP (OHS): 10 MMOL/L (ref 8–16)
AORTIC SIZE INDEX (SOV): 2.2 CM/M2
AORTIC SIZE INDEX: 1.9 CM/M2
ASCENDING AORTA: 2.7 CM
AV INDEX (PROSTH): 0.8
AV MEAN GRADIENT: 3 MMHG
AV PEAK GRADIENT: 5 MMHG
AV VALVE AREA BY VELOCITY RATIO: 2 CM²
AV VALVE AREA: 2.5 CM²
AV VELOCITY RATIO: 0.64
BACTERIA BLD CULT: ABNORMAL
BACTERIA BLD CULT: ABNORMAL
BSA FOR ECHO PROCEDURE: 1.28 M2
BUN SERPL-MCNC: 13 MG/DL (ref 8–23)
CALCIUM SERPL-MCNC: 8.7 MG/DL (ref 8.7–10.5)
CHLORIDE SERPL-SCNC: 108 MMOL/L (ref 95–110)
CO2 SERPL-SCNC: 21 MMOL/L (ref 23–29)
CREAT SERPL-MCNC: 0.6 MG/DL (ref 0.5–1.4)
CV ECHO LV RWT: 0.37 CM
DOP CALC AO PEAK VEL: 1.1 M/S
DOP CALC AO VTI: 18.9 CM
DOP CALC LVOT AREA: 3.1 CM2
DOP CALC LVOT DIAMETER: 2 CM
DOP CALC LVOT PEAK VEL: 0.7 M/S
DOP CALC LVOT STROKE VOLUME: 47.4 CM3
DOP CALC MV VTI: 15.1 CM
DOP CALCLVOT PEAK VEL VTI: 15.1 CM
E WAVE DECELERATION TIME: 77 MSEC
E/A RATIO: 0.51
E/E' RATIO: 8 M/S
ECHO LV POSTERIOR WALL: 0.8 CM (ref 0.6–1.1)
ERYTHROCYTE [DISTWIDTH] IN BLOOD BY AUTOMATED COUNT: 14.9 % (ref 11.5–14.5)
FRACTIONAL SHORTENING: 11.6 % (ref 28–44)
GFR SERPLBLD CREATININE-BSD FMLA CKD-EPI: >60 ML/MIN/1.73/M2
GLUCOSE SERPL-MCNC: 132 MG/DL (ref 70–110)
GRAM STN SPEC: ABNORMAL
HCT VFR BLD AUTO: 29.9 % (ref 37–48.5)
HGB BLD-MCNC: 9.6 GM/DL (ref 12–16)
INTERVENTRICULAR SEPTUM: 1 CM (ref 0.6–1.1)
IVC DIAMETER: 1.3 CM
IVRT: 117 MSEC
LA MAJOR: 4.7 CM
LA MINOR: 4.1 CM
LA WIDTH: 3.3 CM
LEFT ATRIUM SIZE: 3.3 CM
LEFT ATRIUM VOLUME INDEX: 28 ML/M2
LEFT ATRIUM VOLUME: 41 CM3
LEFT INTERNAL DIMENSION IN SYSTOLE: 3.8 CM (ref 2.1–4)
LEFT VENTRICLE DIASTOLIC VOLUME INDEX: 57.64 ML/M2
LEFT VENTRICLE DIASTOLIC VOLUME: 83 ML
LEFT VENTRICLE MASS INDEX: 85.6 G/M2
LEFT VENTRICLE SYSTOLIC VOLUME INDEX: 42.4 ML/M2
LEFT VENTRICLE SYSTOLIC VOLUME: 61 ML
LEFT VENTRICULAR INTERNAL DIMENSION IN DIASTOLE: 4.3 CM (ref 3.5–6)
LEFT VENTRICULAR MASS: 123.3 G
LV LATERAL E/E' RATIO: 6.8 M/S
LV SEPTAL E/E' RATIO: 9 M/S
LVED V (TEICH): 83.28 ML
LVES V (TEICH): 61.08 ML
LVOT MG: 1.06 MMHG
LVOT MV: 0.49 CM/S
MCH RBC QN AUTO: 32.5 PG (ref 27–31)
MCHC RBC AUTO-ENTMCNC: 32.1 G/DL (ref 32–36)
MCV RBC AUTO: 101 FL (ref 82–98)
MV MEAN GRADIENT: 3 MMHG
MV PEAK A VEL: 1.05 M/S
MV PEAK E VEL: 0.54 M/S
MV PEAK GRADIENT: 6 MMHG
MV STENOSIS PRESSURE HALF TIME: 22.23 MS
MV VALVE AREA BY CONTINUITY EQUATION: 3.14 CM2
MV VALVE AREA P 1/2 METHOD: 9.9 CM2
OHS CV RV/LV RATIO: 0.65 CM
OHS QRS DURATION: 76 MS
OHS QTC CALCULATION: 451 MS
PISA TR MAX VEL: 2.4 M/S
PLATELET # BLD AUTO: 395 K/UL (ref 150–450)
PMV BLD AUTO: 9.8 FL (ref 9.2–12.9)
POTASSIUM SERPL-SCNC: 3.7 MMOL/L (ref 3.5–5.1)
PV PEAK GRADIENT: 2 MMHG
PV PEAK VELOCITY: 0.7 M/S
RA MAJOR: 3.73 CM
RA PRESSURE ESTIMATED: 3 MMHG
RA WIDTH: 3.2 CM
RBC # BLD AUTO: 2.95 M/UL (ref 4–5.4)
RIGHT VENTRICLE DIASTOLIC BASEL DIMENSION: 2.8 CM
RIGHT VENTRICULAR END-DIASTOLIC DIMENSION: 2.82 CM
RV TB RVSP: 5 MMHG
RV TISSUE DOPPLER FREE WALL SYSTOLIC VELOCITY 1 (APICAL 4 CHAMBER VIEW): 14.21 CM/S
SINUS: 3.1 CM
SODIUM SERPL-SCNC: 139 MMOL/L (ref 136–145)
STJ: 2.1 CM
TDI LATERAL: 0.08 M/S
TDI SEPTAL: 0.06 M/S
TDI: 0.07 M/S
TR MAX PG: 23 MMHG
TRICUSPID ANNULAR PLANE SYSTOLIC EXCURSION: 1.5 CM
TV REST PULMONARY ARTERY PRESSURE: 26 MMHG
VANCOMYCIN SERPL-MCNC: 8.4 UG/ML (ref ?–80)
WBC # BLD AUTO: 7.27 K/UL (ref 3.9–12.7)
Z-SCORE OF LEFT VENTRICULAR DIMENSION IN END DIASTOLE: -0.18
Z-SCORE OF LEFT VENTRICULAR DIMENSION IN END SYSTOLE: 2.65

## 2025-07-05 PROCEDURE — 27000207 HC ISOLATION

## 2025-07-05 PROCEDURE — A4216 STERILE WATER/SALINE, 10 ML: HCPCS | Performed by: STUDENT IN AN ORGANIZED HEALTH CARE EDUCATION/TRAINING PROGRAM

## 2025-07-05 PROCEDURE — 11000001 HC ACUTE MED/SURG PRIVATE ROOM

## 2025-07-05 PROCEDURE — 25000003 PHARM REV CODE 250: Performed by: STUDENT IN AN ORGANIZED HEALTH CARE EDUCATION/TRAINING PROGRAM

## 2025-07-05 PROCEDURE — 63600175 PHARM REV CODE 636 W HCPCS: Performed by: STUDENT IN AN ORGANIZED HEALTH CARE EDUCATION/TRAINING PROGRAM

## 2025-07-05 PROCEDURE — 63600175 PHARM REV CODE 636 W HCPCS: Performed by: PHYSICIAN ASSISTANT

## 2025-07-05 PROCEDURE — 94761 N-INVAS EAR/PLS OXIMETRY MLT: CPT

## 2025-07-05 PROCEDURE — 25000003 PHARM REV CODE 250: Performed by: INTERNAL MEDICINE

## 2025-07-05 PROCEDURE — 82374 ASSAY BLOOD CARBON DIOXIDE: CPT | Performed by: STUDENT IN AN ORGANIZED HEALTH CARE EDUCATION/TRAINING PROGRAM

## 2025-07-05 PROCEDURE — 63600175 PHARM REV CODE 636 W HCPCS: Performed by: INTERNAL MEDICINE

## 2025-07-05 PROCEDURE — 85027 COMPLETE CBC AUTOMATED: CPT | Performed by: STUDENT IN AN ORGANIZED HEALTH CARE EDUCATION/TRAINING PROGRAM

## 2025-07-05 PROCEDURE — 36415 COLL VENOUS BLD VENIPUNCTURE: CPT | Performed by: STUDENT IN AN ORGANIZED HEALTH CARE EDUCATION/TRAINING PROGRAM

## 2025-07-05 PROCEDURE — 80202 ASSAY OF VANCOMYCIN: CPT | Performed by: STUDENT IN AN ORGANIZED HEALTH CARE EDUCATION/TRAINING PROGRAM

## 2025-07-05 RX ORDER — ENOXAPARIN SODIUM 100 MG/ML
1 INJECTION SUBCUTANEOUS EVERY 12 HOURS
Status: DISCONTINUED | OUTPATIENT
Start: 2025-07-05 | End: 2025-07-07

## 2025-07-05 RX ADMIN — PIPERACILLIN SODIUM AND TAZOBACTAM SODIUM 4.5 G: 4; .5 INJECTION, POWDER, FOR SOLUTION INTRAVENOUS at 12:07

## 2025-07-05 RX ADMIN — Medication 125 MG: at 07:07

## 2025-07-05 RX ADMIN — FAMOTIDINE 20 MG: 40 POWDER, FOR SUSPENSION ORAL at 10:07

## 2025-07-05 RX ADMIN — Medication 125 MG: at 06:07

## 2025-07-05 RX ADMIN — ACETAMINOPHEN 1000 MG: 500 TABLET, FILM COATED ORAL at 03:07

## 2025-07-05 RX ADMIN — Medication 125 MG: at 12:07

## 2025-07-05 RX ADMIN — MUPIROCIN: 20 OINTMENT TOPICAL at 08:07

## 2025-07-05 RX ADMIN — ACETAMINOPHEN 1000 MG: 500 TABLET, FILM COATED ORAL at 06:07

## 2025-07-05 RX ADMIN — VANCOMYCIN HYDROCHLORIDE 1000 MG: 1 INJECTION, POWDER, LYOPHILIZED, FOR SOLUTION INTRAVENOUS at 04:07

## 2025-07-05 RX ADMIN — PIPERACILLIN SODIUM AND TAZOBACTAM SODIUM 4.5 G: 4; .5 INJECTION, POWDER, FOR SOLUTION INTRAVENOUS at 03:07

## 2025-07-05 RX ADMIN — LEVETIRACETAM 500 MG: 100 INJECTION INTRAVENOUS at 08:07

## 2025-07-05 RX ADMIN — FAMOTIDINE 20 MG: 40 POWDER, FOR SUSPENSION ORAL at 08:07

## 2025-07-05 RX ADMIN — ACETAMINOPHEN 1000 MG: 500 TABLET, FILM COATED ORAL at 09:07

## 2025-07-05 RX ADMIN — PIPERACILLIN SODIUM AND TAZOBACTAM SODIUM 4.5 G: 4; .5 INJECTION, POWDER, FOR SOLUTION INTRAVENOUS at 08:07

## 2025-07-05 RX ADMIN — PIPERACILLIN SODIUM AND TAZOBACTAM SODIUM 4.5 G: 4; .5 INJECTION, POWDER, FOR SOLUTION INTRAVENOUS at 11:07

## 2025-07-05 RX ADMIN — MICAFUNGIN SODIUM 100 MG: 100 INJECTION, POWDER, LYOPHILIZED, FOR SOLUTION INTRAVENOUS at 12:07

## 2025-07-05 RX ADMIN — ENOXAPARIN SODIUM 50 MG: 60 INJECTION SUBCUTANEOUS at 09:07

## 2025-07-05 RX ADMIN — LEVETIRACETAM 500 MG: 100 INJECTION INTRAVENOUS at 09:07

## 2025-07-05 RX ADMIN — MUPIROCIN: 20 OINTMENT TOPICAL at 09:07

## 2025-07-05 RX ADMIN — PANTOPRAZOLE SODIUM 40 MG: 40 INJECTION, POWDER, FOR SOLUTION INTRAVENOUS at 12:07

## 2025-07-05 RX ADMIN — SODIUM CHLORIDE, POTASSIUM CHLORIDE, SODIUM LACTATE AND CALCIUM CHLORIDE 1000 ML: 600; 310; 30; 20 INJECTION, SOLUTION INTRAVENOUS at 04:07

## 2025-07-05 NOTE — ASSESSMENT & PLAN NOTE
Initially diagnosed 6/2025.   Confirmed with NH completed,   Stool sample obtain positive for C diff antigen and toxin  Started on extended vancomycin.

## 2025-07-05 NOTE — ASSESSMENT & PLAN NOTE
"Patient has Combined Systolic and Diastolic heart failure that is Chronic. On presentation their CHF was well compensated. Most recent BNP and echo results are listed below.  No results for input(s): "BNP" in the last 72 hours.  Latest ECHO  No results found for this or any previous visit.    Current Heart Failure Medications  , Daily, Per G Tube  , 2 times daily, FEEDING TUBE    Plan  - Monitor strict I&Os and daily weights.    - Place on telemetry  - Low sodium diet  - Place on fluid restriction of 1.5 L.   - Cardiology has not been consulted  - The patient's volume status is at their baseline  - continue metoprolol and entresto for GDMT.     Echo in care everywhere with EF of 35 to 40% and grade 1 diastolic dysfunction.           "

## 2025-07-05 NOTE — SUBJECTIVE & OBJECTIVE
Interval History:     Review of Systems   Unable to perform ROS: Patient nonverbal     Objective:     Vital Signs (Most Recent):  Temp: 98.3 °F (36.8 °C) (07/05/25 0911)  Pulse: 110 (07/05/25 1057)  Resp: 18 (07/05/25 0911)  BP: (!) 151/75 (07/05/25 0911)  SpO2: 99 % (07/05/25 0911) Vital Signs (24h Range):  Temp:  [98.3 °F (36.8 °C)-100.8 °F (38.2 °C)] 98.3 °F (36.8 °C)  Pulse:  [106-143] 110  Resp:  [16-32] 18  SpO2:  [92 %-100 %] 99 %  BP: (129-163)/(57-77) 151/75     Weight: 49 kg (108 lb 0.4 oz)  Body mass index is 21.1 kg/m².    Intake/Output Summary (Last 24 hours) at 7/5/2025 1419  Last data filed at 7/5/2025 0800  Gross per 24 hour   Intake 1763.08 ml   Output 950 ml   Net 813.08 ml         Physical Exam    Constitutional:       General: She is not in acute distress.     Appearance: She is normal weight. She is ill-appearing. She is not toxic-appearing or diaphoretic.   HENT:      Head: Normocephalic and atraumatic.      Nose: Nose normal. No congestion or rhinorrhea.      Mouth: Mucous membranes are dry.   Eyes:      General: No scleral icterus.     Extraocular Movements: Extraocular movements intact.      Conjunctiva/sclera: Conjunctivae normal.      Pupils: Pupils are equal, round, and reactive to light.   Cardiovascular:      Rate and Rhythm: Regular rhythm. Tachycardia present.      Pulses: Normal pulses.      Heart sounds: Normal heart sounds. No murmur heard.  Pulmonary:      Effort: Pulmonary effort is normal. No respiratory distress.      Breath sounds: Normal breath sounds. No wheezing, rhonchi or rales.   Chest:      Chest wall: No tenderness.   Abdominal:      General: Abdomen is flat. Bowel sounds are normal. There is no distension.      Palpations: Abdomen is soft and flat   Musculoskeletal:      Cervical back: No rigidity.      Right lower leg: No edema.      Left lower leg: No edema.   Skin:     General: Skin is warm and dry.      Coloration: Skin is not jaundiced.   Neurological:       Mental Status: She is alert. Mental status is at baseline.      Sensory:  Could not elicit     Motor: Weakness present., with contractures   Psychiatric:         Mood and Affect:  Flat affect      Significant Labs: All pertinent labs within the past 24 hours have been reviewed.    Significant Imaging: I have reviewed all pertinent imaging results/findings within the past 24 hours.  Imaging Results              CTA Chest Non-Coronary (PE Studies) (Final result)  Result time 07/02/25 15:14:10      Final result by Jose Alston MD (07/02/25 15:14:10)                   Impression:      1. No pulmonary thromboembolism.  2. Scattered ground-glass attenuation, findings may reflect edema or other pneumonitis.  Short-term follow-up, no greater than 3 months, is recommended to ensure resolution.  3. Please see above for additional findings.      Electronically signed by: Jose Alston MD  Date:    07/02/2025  Time:    15:14               Narrative:    EXAMINATION:  CTA CHEST NON CORONARY (PE STUDIES)    CLINICAL HISTORY:  Pulmonary embolism (PE) suspected, unknown D-dimer;    TECHNIQUE:  Low dose axial images, sagittal and coronal reformations were obtained from the thoracic inlet to the lung bases following the IV administration of 70 mL of Omnipaque 350.  Contrast timing was optimized to evaluate the pulmonary arteries.  MIP images were performed.    COMPARISON:  Radiograph 07/02/2025    FINDINGS:  The structures at the base of the neck are unremarkable.  No significant mediastinal lymphadenopathy.  The thoracic aorta tapers normally noting atherosclerotic plaque and calcification along its course and in the distribution of the coronary arteries.  The visualized portions of the kidneys, adrenal glands, spleen and liver are grossly unremarkable.    Allowing for motion artifact, the airways are patent centrally.  There is high attenuating material within few airways to the right upper lobe, uncertain significance.   There is scattered interlobular septal thickening.  There are multiple scattered regions of ground-glass attenuation particularly in a perihilar distribution, may reflect edema or other developing pneumonitis.  No large focal consolidation.  There is bilateral basilar dependent atelectasis.  No pneumothorax.  No pleural effusion.    Bolus timing is adequate for evaluation of pulmonary thromboembolism.  No convincing pulmonary arterial filling defect to the level of the proximal segmental branches bilaterally to suggest pulmonary thromboembolism.    There is osteopenia.  There are degenerative changes of the spine.  No significant axillary lymphadenopathy.                                       X-Ray Chest 1 View (Final result)  Result time 07/02/25 13:47:16      Final result by Jose Alston MD (07/02/25 13:47:16)                   Impression:      As above      Electronically signed by: Jose Alston MD  Date:    07/02/2025  Time:    13:47               Narrative:    EXAMINATION:  XR CHEST 1 VIEW    CLINICAL HISTORY:  Shortness of breath    TECHNIQUE:  Single frontal view of the chest was performed.    COMPARISON:  07/02/2025    FINDINGS:  There is no pneumothorax.  Coarse interstitial attenuation is more conspicuous than on the previous exam, developing edema or infection is a consideration.  There is no pneumothorax or other significant detrimental change since the previous exam.                                       X-Ray Chest AP Portable (Final result)  Result time 07/02/25 11:03:14      Final result by Rakesh Mistry MD (07/02/25 11:03:14)                   Impression:      No acute radiographic findings in the chest on this single view.      Electronically signed by: Rakesh Mistry MD  Date:    07/02/2025  Time:    11:03               Narrative:    EXAMINATION:  XR CHEST AP PORTABLE    CLINICAL HISTORY:  Sepsis;    TECHNIQUE:  Single frontal view of the chest was  performed.    COMPARISON:  None.    FINDINGS:  Cardiac monitoring leads project over the bilateral hemithoraces.  Mediastinal structures are midline. Hilar contours are unremarkable.  Cardiac silhouette is normal in size. Lung volumes are normal and symmetric.  No consolidation.  No pneumothorax or pleural effusion.  No free air beneath the diaphragm. No acute osseous abnormalities.  Degenerative changes of the spine and shoulders.

## 2025-07-05 NOTE — PROGRESS NOTES
[facility] Medicine  Progress Note    Patient Name: Lynda Mackenzie  MRN: [id number]  Patient Class: IP- Inpatient   Admission Date: 7/2/2025  Length of Stay: 3 days  Attending Physician: Isiah Rodriguez MD  Primary Care Provider: Rut Rosenthal MD        Subjective     Principal Problem:Severe sepsis        HPI:  Lynda Mackenzie 69 y.o. 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 The patient is unable to provide any history due to baseline aphasia.     Discussed with nurse from [facility] she reports this morning patient's respiratory rate was elevated near 30 and she was hypoxic requiring supplemental oxygen causing activation of EMS.  At baseline she is not on supplemental oxygen.  She is nonverbal at baseline.  She has been tolerating her tube feeds at 40 cc an hour continuous with 140cc every 4 hours FWF.  She completed her vancomycin from previous hospitalization for C diff colitis.  There has been no diarrhea at the nursing home.  There have been no witnessed episodes of emesis or seizures.  She has not had a fever.  She receives her Keppra and Eliquis via PEG.    Discussed with patient's daughter KIRK he will confirms DNR status.  She has baseline aphasia and right-sided hemiparesis from previous CVA. She is not on home oxygen.     In the ED, tachycardic near 140 white blood cell count 22 initial lactic acid 3.5 repeat 2.4 UA with white blood cells bacteria initial chest x-ray without acute process, repeat after 1.7 L of IVF with increased conspicuous lung markings.        Overview/Hospital Course:  She was admitted with temp 100F, tachycardic with heart rate in the 140-150, tachypneic with respiratory rate 24-28, and leukocytosis 23k in keeping with sepsis. Started on V vancomycin,  Cefepime and metronidazole.  C diff returned with positive antigen and toxin, extended treatment with oral vancomycin initiated, Urine culture growing >100k CFU's of yeast, Micafungin added for now unsure if this was colonization.  Heart rate not improving with volume resuscitation.  Blood culture with GPC in one bottle, may be contaminant but will keep IV vanc on until it is determined. Does have some discharge around her PEG, unclear if fungal or food discharge, will deescalate with culture results. TSH result within normal limits.    Interval History:     Review of Systems   Unable to perform ROS: Patient nonverbal     Objective:     Vital Signs (Most Recent):  Temp: 98.3 °F (36.8 °C) (07/05/25 0911)  Pulse: 110 (07/05/25 1057)  Resp: 18 (07/05/25 0911)  BP: (!) 151/75 (07/05/25 0911)  SpO2: 99 % (07/05/25 0911) Vital Signs (24h Range):  Temp:  [98.3 °F (36.8 °C)-100.8 °F (38.2 °C)] 98.3 °F (36.8 °C)  Pulse:  [106-143] 110  Resp:  [16-32] 18  SpO2:  [92 %-100 %] 99 %  BP: (129-163)/(57-77) 151/75     Weight: 49 kg (108 lb 0.4 oz)  Body mass index is 21.1 kg/m².    Intake/Output Summary (Last 24 hours) at 7/5/2025 1419  Last data filed at 7/5/2025 0800  Gross per 24 hour   Intake 1763.08 ml   Output 950 ml   Net 813.08 ml         Physical Exam    Constitutional:       General: She is not in acute distress.     Appearance: She is normal weight. She is ill-appearing. She is not toxic-appearing or diaphoretic.   HENT:      Head: Normocephalic and atraumatic.      Nose: Nose normal. No congestion or rhinorrhea.      Mouth: Mucous membranes are dry.   Eyes:      General: No scleral icterus.     Extraocular Movements: Extraocular movements intact.      Conjunctiva/sclera: Conjunctivae normal.      Pupils: Pupils are equal, round, and reactive to light.   Cardiovascular:      Rate and Rhythm: Regular rhythm. Tachycardia present.      Pulses: Normal pulses.      Heart sounds: Normal heart sounds. No murmur  heard.  Pulmonary:      Effort: Pulmonary effort is normal. No respiratory distress.      Breath sounds: Normal breath sounds. No wheezing, rhonchi or rales.   Chest:      Chest wall: No tenderness.   Abdominal:      General: Abdomen is flat. Bowel sounds are normal. There is no distension.      Palpations: Abdomen is soft and flat   Musculoskeletal:      Cervical back: No rigidity.      Right lower leg: No edema.      Left lower leg: No edema.   Skin:     General: Skin is warm and dry.      Coloration: Skin is not jaundiced.   Neurological:      Mental Status: She is alert. Mental status is at baseline.      Sensory:  Could not elicit     Motor: Weakness present., with contractures   Psychiatric:         Mood and Affect:  Flat affect      Significant Labs: All pertinent labs within the past 24 hours have been reviewed.    Significant Imaging: I have reviewed all pertinent imaging results/findings within the past 24 hours.  Imaging Results              CTA Chest Non-Coronary (PE Studies) (Final result)  Result time 07/02/25 15:14:10      Final result by Jose Alston MD (07/02/25 15:14:10)                   Impression:      1. No pulmonary thromboembolism.  2. Scattered ground-glass attenuation, findings may reflect edema or other pneumonitis.  Short-term follow-up, no greater than 3 months, is recommended to ensure resolution.  3. Please see above for additional findings.      Electronically signed by: Jose Alston MD  Date:    07/02/2025  Time:    15:14               Narrative:    EXAMINATION:  CTA CHEST NON CORONARY (PE STUDIES)    CLINICAL HISTORY:  Pulmonary embolism (PE) suspected, unknown D-dimer;    TECHNIQUE:  Low dose axial images, sagittal and coronal reformations were obtained from the thoracic inlet to the lung bases following the IV administration of 70 mL of Omnipaque 350.  Contrast timing was optimized to evaluate the pulmonary arteries.  MIP images were performed.    COMPARISON:  Radiograph  07/02/2025    FINDINGS:  The structures at the base of the neck are unremarkable.  No significant mediastinal lymphadenopathy.  The thoracic aorta tapers normally noting atherosclerotic plaque and calcification along its course and in the distribution of the coronary arteries.  The visualized portions of the kidneys, adrenal glands, spleen and liver are grossly unremarkable.    Allowing for motion artifact, the airways are patent centrally.  There is high attenuating material within few airways to the right upper lobe, uncertain significance.  There is scattered interlobular septal thickening.  There are multiple scattered regions of ground-glass attenuation particularly in a perihilar distribution, may reflect edema or other developing pneumonitis.  No large focal consolidation.  There is bilateral basilar dependent atelectasis.  No pneumothorax.  No pleural effusion.    Bolus timing is adequate for evaluation of pulmonary thromboembolism.  No convincing pulmonary arterial filling defect to the level of the proximal segmental branches bilaterally to suggest pulmonary thromboembolism.    There is osteopenia.  There are degenerative changes of the spine.  No significant axillary lymphadenopathy.                                       X-Ray Chest 1 View (Final result)  Result time 07/02/25 13:47:16      Final result by Jose Alston MD (07/02/25 13:47:16)                   Impression:      As above      Electronically signed by: Jose Alston MD  Date:    07/02/2025  Time:    13:47               Narrative:    EXAMINATION:  XR CHEST 1 VIEW    CLINICAL HISTORY:  Shortness of breath    TECHNIQUE:  Single frontal view of the chest was performed.    COMPARISON:  07/02/2025    FINDINGS:  There is no pneumothorax.  Coarse interstitial attenuation is more conspicuous than on the previous exam, developing edema or infection is a consideration.  There is no pneumothorax or other significant detrimental change since the  previous exam.                                       X-Ray Chest AP Portable (Final result)  Result time 07/02/25 11:03:14      Final result by Rakesh Mistry MD (07/02/25 11:03:14)                   Impression:      No acute radiographic findings in the chest on this single view.      Electronically signed by: Rakesh Mistry MD  Date:    07/02/2025  Time:    11:03               Narrative:    EXAMINATION:  XR CHEST AP PORTABLE    CLINICAL HISTORY:  Sepsis;    TECHNIQUE:  Single frontal view of the chest was performed.    COMPARISON:  None.    FINDINGS:  Cardiac monitoring leads project over the bilateral hemithoraces.  Mediastinal structures are midline. Hilar contours are unremarkable.  Cardiac silhouette is normal in size. Lung volumes are normal and symmetric.  No consolidation.  No pneumothorax or pleural effusion.  No free air beneath the diaphragm. No acute osseous abnormalities.  Degenerative changes of the spine and shoulders.                                          Assessment & Plan  Severe sepsis  Presents with hypoxia, tachycardia, and WBC of 22 from [facility]. Remains tachycardic in the ED despite IVF, and remains on NC. With sacral decubitus without surrounding cellulitis or drainage. Possible source includes urine. Was tachycardic persistently at [facility] during last hospitalization.  Continue oral and intravenous vancomycin, in the setting of C diff and Gram-positive cocci  Blood cultures pending  Urine culture growing Candida glabrata  Stage IV sacral wound could be source of infection as well.    Patient has sepsis with Acute respiratory failure secondary to Urinary Tract Infection.   A review of systems was completed. Patient's sepsis is stable    Current Antibiotics    , 4 times daily, Per G Tube  vancomycin - pharmacy to dose, pharmacy to manage frequency, Intravenous  vancomycin 125 mg/5 mL oral solution 125 mg, Every 6 hours, Per G Tube  vancomycin 125 mg/5 mL oral solution 125 mg, Every 12  hours, Per G Tube  vancomycin 125 mg/5 mL oral solution 125 mg, Daily, Per G Tube  vancomycin 125 mg/5 mL oral solution 125 mg, Every 3 days, Per G Tube  piperacillin-tazobactam (ZOSYN) 4.5 g in D5W 100 mL IVPB (MB+), Every 8 hours (non-standard times), Intravenous  vancomycin (VANCOCIN) 1,000 mg in 0.9% NaCl 250 mL IVPB (admixture device), Every 24 hours (non-standard times), Intravenous    Lactate  Recent Labs   Lab 07/02/25  0852 07/02/25  0901 07/02/25  0906 07/02/25  1133 07/03/25 2021   LACTATE 3.5*  --   --  2.4* 0.9   POCLAC  --  UNAVAILABLE 4.08*  --   --      Culture Data  Blood Cultures   Blood Culture   Date Value Ref Range Status   07/04/2025 No Growth After 24 Hours  Preliminary   07/02/2025 Positive - Aerobic/Pediatric Bottle (A)  Final   07/02/2025 Coagulase-negative Staphylococcus species (A)  Final   07/02/2025 No Growth After 72 Hours  Preliminary      Urine Culture   Urine Culture   Date Value Ref Range Status   07/02/2025 >100,000 cfu/ml Candida glabrata (A)  Final     Comment:     Treatment of asymptomatic candiduria is not recommended (except for specific populations). Candida isolated in the urine typically represents colonization. If an indwelling urinary catheter is presentit should be removed or replaced.      mSOFA  MSOFA Total  Min: 0   Min taken time: 07/05/25 1401  Max: 0   Max taken time: 07/05/25 1401    Plan  - Antibiotics as listed above  - Fluid resuscitation as follows:Contraindicated- Fluid bolus is contraindicated in this patient due to Congestive Heart Failure   - Trend lactate to resolution  - Follow up culture data  - Vasopressors were initiated to maintain MAP >65 due to persistent hypotension after appropriate fluid administration  - The following services were consulted:Palliative Medicine.    Global aphasia  Non-verbal at baseline following her CVA.   Currently on tube feedings.   Aspiration precaution.  Continue range of motion physical therapy    Hemiparesis of right  "dominant side as late effect of cerebral infarction  Per care everywhere.   Aphasic at baseline per NH following a previous cerebrovascular accident  Continue range of motion physical therapy as tolerated following contact precaution  Confirmed with daughter with residual right sided hemiparesis from previous CVA  Hypertension  Patient's blood pressure range in the last 24 hours was: BP  Min: 129/61  Max: 163/77.T  he patient's inpatient anti-hypertensive regimen is listed below:  Current Antihypertensives  , 2 times daily, FEEDING TUBE    Plan  - BP is controlled, no changes needed to their regimen  - home metoprolol/entresto restarted  History of CVA (cerebrovascular accident)  With residual non-verbal status and PEG dependent per discussion with NH    Chronic combined systolic and diastolic CHF (congestive heart failure)  Patient has Combined Systolic and Diastolic heart failure that is Chronic. On presentation their CHF was well compensated. Most recent BNP and echo results are listed below.  No results for input(s): "BNP" in the last 72 hours.  Latest ECHO  No results found for this or any previous visit.    Current Heart Failure Medications  , Daily, Per G Tube  , 2 times daily, FEEDING TUBE    Plan  - Monitor strict I&Os and daily weights.    - Place on telemetry  - Low sodium diet  - Place on fluid restriction of 1.5 L.   - Cardiology has not been consulted  - The patient's volume status is at their baseline  - continue metoprolol and entresto for GDMT.     Echo in care everywhere with EF of 35 to 40% and grade 1 diastolic dysfunction.           PEG (percutaneous endoscopic gastrostomy) status  Patient noted to have a percutaneous endoscopic gastrostomy tube in place. I have personally inspected the tube.Tube was placed prior to this admission   There are no signs of drainage or infection around the site.   The tube is patent. Medications have converted to liquid form if available.    Routine care to be done " by wound care and nursing staff.     On Jevity 1.5 continuous per [facility] at 40 cc/hr with 120 FWF every 4. Nutrition consulted  Will hold resuming tube feedings today given receieved almost 2L of IVF in ED with CHF and repeat x-ray with increased lung markings    Advanced care planning/counseling discussion  Advance Care Planning     Date: 07/02/2025    Code Status  In light of the patients advanced and life limiting illness,I engaged the the family and healthcare power of   in a voluntary conversation about the patient's preferences for care  at the very end of life. The patient wishes to have a natural, peaceful death.  Along those lines, the patient does not wish to have CPR or other invasive treatments performed when her heart and/or breathing stops. I communicated to the family and healthcare power of   that a DNR order would be placed in her medical record to reflect this preference.    A total of 16 min was spent on advance care planning, goals of care discussion, emotional support, formulating and communicating prognosis and exploring burden/benefit of various approaches of treatment. This discussion occurred on a fully voluntary basis with the verbal consent of the patient and/or family.     Discussed with KIRK who is also director of nursing at NH where patient resides. Confirmed DNR status. Palliative care consulted       Seizures  Convert home keppra to IV while hospitalized  Cefepime has been discontinued.  Avoid tramadol and other seizure lowering meds  History of Clostridioides difficile colitis  Initially diagnosed 6/2025.   Confirmed with NH completed,   Stool sample obtain positive for C diff antigen and toxin  Started on extended vancomycin.  History of pulmonary embolism  Currently on Lovenox  CT PE protocol was repeated given  hypoxia and persistent tachycardia despite volume resuscitation   Repeat CT PE protocol showed no new pulmonary emboli  Confirmed with NH has been  receiving apixaban  Continue Lovenox for DVT prophylaxis.  Severe protein-calorie malnutrition  Nutrition consulted. Most recent weight and BMI monitored-     Measurements:  Wt Readings from Last 1 Encounters:   07/05/25 49 kg (108 lb 0.4 oz)   Body mass index is 21.1 kg/m².    Patient has been screened and assessed by RD.    Malnutrition Type:  Context:    Level:      Malnutrition Characteristic Summary:       Interventions/Recommendations (treatment strategy):  1. Recommend initiation of EN regimen of Isosource 1.5 at 10 mL/hr and advance as tolerated to goal rate of 45 mL/hr to provide 1620 kcal, 73 gm pro, and 825 mL free water. Additional FWF per MD. 2. Addition of Bradley BID to promote wound healing. Mix 1 packet of Bradley in  mL water, dissolve well and administer via PEG. Addition of 30-60 mL FWF before and after administration. 3. Monitor weight/labs. 4. RD to follow and monitor nutrition status    Open wound of skin  Wound care consult for wound dressing.  Multiple ulcers, worse-stage IV sacral wound  Acute cystitis without hematuria  Patient is nonverbal, could not give history of UTI  Urine culture growing Candida glabrata, unsure if this is colonization  Currently on micafungin.  Awaiting final results from blood culture  Will consult ID  VTE Risk Mitigation (From admission, onward)           Ordered     enoxaparin injection 50 mg  Every 12 hours         07/05/25 1228     IP VTE HIGH RISK PATIENT  Once         07/02/25 1344     Place sequential compression device  Until discontinued         07/02/25 1344     Place DEBBY hose  Until discontinued         07/02/25 1344                    Discharge Planning   LORENA:      Code Status: DNR   Medical Readiness for Discharge Date:   Discharge Plan A: Skilled Nursing Facility                        Isiah Rodriguez MD  Department of Hospital Medicine   [facility]

## 2025-07-05 NOTE — ASSESSMENT & PLAN NOTE
Non-verbal at baseline following her CVA.   Currently on tube feedings.   Aspiration precaution.  Continue range of motion physical therapy

## 2025-07-05 NOTE — ASSESSMENT & PLAN NOTE
Presents with hypoxia, tachycardia, and WBC of 22 from [facility]. Remains tachycardic in the ED despite IVF, and remains on NC. With sacral decubitus without surrounding cellulitis or drainage. Possible source includes urine. Was tachycardic persistently at  during last hospitalization.  Continue oral and intravenous vancomycin, in the setting of C diff and Gram-positive cocci  Blood cultures pending  Urine culture growing Candida glabrata  Stage IV sacral wound could be source of infection as well.    Patient has sepsis with Acute respiratory failure secondary to Urinary Tract Infection.   A review of systems was completed. Patient's sepsis is stable    Current Antibiotics    , 4 times daily, Per G Tube  vancomycin - pharmacy to dose, pharmacy to manage frequency, Intravenous  vancomycin 125 mg/5 mL oral solution 125 mg, Every 6 hours, Per G Tube  vancomycin 125 mg/5 mL oral solution 125 mg, Every 12 hours, Per G Tube  vancomycin 125 mg/5 mL oral solution 125 mg, Daily, Per G Tube  vancomycin 125 mg/5 mL oral solution 125 mg, Every 3 days, Per G Tube  piperacillin-tazobactam (ZOSYN) 4.5 g in D5W 100 mL IVPB (MB+), Every 8 hours (non-standard times), Intravenous  vancomycin (VANCOCIN) 1,000 mg in 0.9% NaCl 250 mL IVPB (admixture device), Every 24 hours (non-standard times), Intravenous    Lactate  Recent Labs   Lab 07/02/25  0852 07/02/25  0901 07/02/25  0906 07/02/25  1133 07/03/25 2021   LACTATE 3.5*  --   --  2.4* 0.9   POCLAC  --  UNAVAILABLE 4.08*  --   --      Culture Data  Blood Cultures   Blood Culture   Date Value Ref Range Status   07/04/2025 No Growth After 24 Hours  Preliminary   07/02/2025 Positive - Aerobic/Pediatric Bottle (A)  Final   07/02/2025 Coagulase-negative Staphylococcus species (A)  Final   07/02/2025 No Growth After 72 Hours  Preliminary      Urine Culture   Urine Culture   Date Value Ref Range Status   07/02/2025 >100,000 cfu/ml Candida glabrata (A)  Final     Comment:     Treatment of  asymptomatic candiduria is not recommended (except for specific populations). Candida isolated in the urine typically represents colonization. If an indwelling urinary catheter is presentit should be removed or replaced.      mSOFA  MSOFA Total  Min: 0   Min taken time: 07/05/25 1401  Max: 0   Max taken time: 07/05/25 1401    Plan  - Antibiotics as listed above  - Fluid resuscitation as follows:Contraindicated- Fluid bolus is contraindicated in this patient due to Congestive Heart Failure   - Trend lactate to resolution  - Follow up culture data  - Vasopressors were initiated to maintain MAP >65 due to persistent hypotension after appropriate fluid administration  - The following services were consulted:Palliative Medicine.

## 2025-07-05 NOTE — PLAN OF CARE
Problem: Skin Injury Risk Increased  Goal: Skin Health and Integrity  Outcome: Progressing     Problem: Coping Ineffective  Goal: Effective Coping  Outcome: Progressing     Problem: Wound  Goal: Optimal Coping  Outcome: Progressing  Goal: Absence of Infection Signs and Symptoms  Outcome: Progressing

## 2025-07-05 NOTE — NURSING
[facility]  Nurses Note -- 4 Eyes      7/4/2025       Skin assessed on: Q Shift      [] No Pressure Injuries Present    []Prevention Measures Documented    [x] Yes LDA  for Pressure Injury Previously documented     [] Yes New Pressure Injury Discovered   [] LDA for New Pressure Injury Added      Attending RN:  Rosa Monson RN     Second RN:  Fabien GORMAN

## 2025-07-05 NOTE — PROGRESS NOTES
Pharmacokinetic Assessment Follow Up: IV Vancomycin    Vancomycin serum concentration assessment(s):    The random level was drawn correctly and can be used to guide therapy at this time. The measurement is below the desired definitive target range of 10 to 20 mcg/mL.    Vancomycin Regimen Plan:    Change regimen to Vancomycin 1000 mg IV every 24 hours with next serum trough concentration measured at 0400 prior to 2nd dose on 7/6/25    Drug levels (last 3 results):  Recent Labs   Lab Result Units 07/03/25  0313 07/04/25  0455 07/05/25  0322   Vancomycin Random ug/ml 8.6 10.7 8.4       Pharmacy will continue to follow and monitor vancomycin.    Please contact pharmacy at extension [phone] for questions regarding this assessment.    Thank you for the consult,   Ezra Moser       Patient brief summary:  Lynda Mackenzie is a 69 y.o. female initiated on antimicrobial therapy with IV Vancomycin for treatment of bacteremia    The patient's current regimen is Vancomycin 1000 mg q24h    Drug Allergies:   Review of patient's allergies indicates:  No Known Allergies    Actual Body Weight:   48 kg    Renal Function:   Estimated Creatinine Clearance: 63.6 mL/min (based on SCr of 0.6 mg/dL).,     Dialysis Method (if applicable):  N/A    CBC (last 72 hours):  Recent Labs   Lab Result Units 07/02/25  0852 07/03/25  0313 07/04/25  0753 07/05/25  0322   WBC K/uL 22.59* 18.27* 8.41 7.27   HGB gm/dL 10.6* 9.2* 8.5* 9.6*   HCT % 33.4* 28.9* 27.6* 29.9*   Platelet Count K/uL 571* 475* 399 395   Lymph % % 20.9 11.4* 17.1*  --    Mono % % 9.3 5.5 8.9  --    Eos % % 0.4 0.7 3.1  --    Basophil % % 0.7 0.4 0.2  --        Metabolic Panel (last 72 hours):  Recent Labs   Lab Result Units 07/02/25  0852 07/02/25  1044 07/03/25  0313 07/04/25  1607 07/05/25  0322   Sodium mmol/L 139  --  142 141 139   Potassium mmol/L 4.8  --  3.9 3.5 3.7   Chloride mmol/L 97  --  102 109 108   CO2 mmol/L 25  --  26 21* 21*   Glucose mg/dL 137*  --  96  159* 132*   Glucose, UA   --  3+*  --   --   --    BUN mg/dL 40*  --  28* 14 13   Creatinine mg/dL 1.2  --  0.8 0.6 0.6   Albumin g/dL 2.3*  --  2.1*  --   --    Bilirubin Total mg/dL 0.3  --  0.3  --   --    ALP unit/L 100  --  89  --   --    AST unit/L 137*  --  148*  --   --    ALT unit/L 35  --  33  --   --        Vancomycin Administrations:  vancomycin given in the last 96 hours                     vancomycin (VANCOCIN) 1,000 mg in 0.9% NaCl 250 mL IVPB (admixture device) (mg) 1,000 mg New Bag 07/05/25 0459    vancomycin 125 mg/5 mL oral solution 125 mg (mg) 125 mg Given 07/05/25 0037     125 mg Given 07/04/25 1742     125 mg Given  1208     125 mg Given  0526     125 mg Given  0100     125 mg Given 07/03/25 1744     125 mg Given  1313    vancomycin (VANCOCIN) 500 mg in D5W 100 mL IVPB (MB+) (mg) 500 mg New Bag 07/04/25 0739    vancomycin 750 mg in 0.9% NaCl 250 mL IVPB (admixture device) (mg) 750 mg New Bag 07/03/25 0619    vancomycin 750 mg in 0.9% NaCl 250 mL IVPB (admixture device) (mg) 750 mg New Bag 07/02/25 0945                    Microbiologic Results:  Microbiology Results (last 7 days)       Procedure Component Value Units Date/Time    Blood culture [[id number]]  (Normal) Collected: 07/04/25 0455    Order Status: Completed Specimen: Blood Updated: 07/04/25 1201     Blood Culture No Growth After 6 Hours    Urine culture [[id number]]  (Abnormal) Collected: 07/02/25 1044    Order Status: Completed Specimen: Urine, Catheterized Updated: 07/04/25 0956     Urine Culture >100,000 cfu/ml Candida glabrata     Comment: Treatment of asymptomatic candiduria is not recommended (except for specific populations). Candida isolated in the urine typically represents colonization. If an indwelling urinary catheter is presentit should be removed or replaced.       Blood culture x two cultures. Draw prior to antibiotics. [[id number]]  (Normal) Collected: 07/02/25 0852    Order Status: Completed Specimen: Blood from  Peripheral, Antecubital, Right Updated: 07/04/25 0901     Blood Culture No Growth After 48 Hours    Blood culture x two cultures. Draw prior to antibiotics. [[id number]]  (Abnormal) Collected: 07/02/25 0917    Order Status: Completed Specimen: Blood from Peripheral, Forearm, Left Updated: 07/04/25 0811     Blood Culture Positive - Aerobic/Pediatric Bottle      Coagulase-negative Staphylococcus species     GRAM STAIN Gram positive cocci in clusters resembling Staph     Comment: Positive - Aerobic/Pediatric Bottle       Narrative:      Organism is a probable contaminant.    Clostridium difficile EIA [[id number]]  (Abnormal) Collected: 07/02/25 2333    Order Status: Completed Specimen: Stool Updated: 07/03/25 1144     C. DIFFICILE GDH AG Positive     Clostridioides difficile Toxin A/B Positive    Respiratory Infection Panel (PCR), Nasopharyngeal [[id number]] Collected: 07/02/25 1829    Order Status: Completed Specimen: Nasopharyngeal Swab Updated: 07/03/25 1119     Respiratory Infection Panel Source Nasopharyngeal Swab     Adenovirus Not Detected     Coronavirus 229E, Common Cold Virus Not Detected     Coronavirus HKU1, Common Cold Virus Not Detected     Coronavirus NL63, Common Cold Virus Not Detected     Coronavirus OC43, Common Cold Virus Not Detected     SARS-CoV2 (COVID-19) Qualitative PCR Not Detected     Human Metapneumovirus Not Detected     Human Rhinovirus/Enterovirus Not Detected     Influenza A Not Detected     Influenza B Not Detected     Parainfluenza Virus 1 Not Detected     Parainfluenza Virus 2 Not Detected     Parainfluenza Virus 3 Not Detected     Parainfluenza Virus 4 Not Detected     Respiratory Syncytial Virus Not Detected     Bordetella Parapertussis (WI0510) Not Detected     Bordetella pertussis (ptxP) Not Detected     Chlamydia pneumoniae Not Detected     Mycoplasma pneumoniae Not Detected    Influenza A & B by Molecular [[id number]]  (Normal) Collected: 07/02/25 1829    Order Status: Completed  Specimen: Nasal Swab Updated: 07/02/25 1928     INFLUENZA A MOLECULAR Negative     INFLUENZA B MOLECULAR  Negative

## 2025-07-05 NOTE — ASSESSMENT & PLAN NOTE
Patient's blood pressure range in the last 24 hours was: BP  Min: 129/61  Max: 163/77.T  he patient's inpatient anti-hypertensive regimen is listed below:  Current Antihypertensives  , 2 times daily, FEEDING TUBE    Plan  - BP is controlled, no changes needed to their regimen  - home metoprolol/entresto restarted

## 2025-07-05 NOTE — NURSING
Patient arrived on the unit via stretcher on room air, no distress noted. Awake and alert. No distress noted. Situated in bed, Bed alarm set. Vitals and 4eyes completed. Peg tube site assessed. Moist drainage, Site cleaned, Tcut Dressing applied. Zosyn restarted.

## 2025-07-05 NOTE — ASSESSMENT & PLAN NOTE
Per care everywhere.   Aphasic at baseline per NH following a previous cerebrovascular accident  Continue range of motion physical therapy as tolerated following contact precaution  Confirmed with daughter with residual right sided hemiparesis from previous CVA

## 2025-07-05 NOTE — NURSING
[facility]  Nurses Note -- 4 Eyes      7/5/2025       Skin assessed on: Transfer      [] No Pressure Injuries Present    []Prevention Measures Documented    [x] Yes LDA  for Pressure Injury Previously documented     [] Yes New Pressure Injury Discovered   [] LDA for New Pressure Injury Added      Attending RN:  Katie Granado LPN     Second RN:  Ame CURTIS RN

## 2025-07-05 NOTE — ASSESSMENT & PLAN NOTE
Currently on Lovenox  CT PE protocol was repeated given  hypoxia and persistent tachycardia despite volume resuscitation   Repeat CT PE protocol showed no new pulmonary emboli  Confirmed with NH has been receiving apixaban  Continue Lovenox for DVT prophylaxis.

## 2025-07-05 NOTE — ASSESSMENT & PLAN NOTE
Patient is nonverbal, could not give history of UTI  Urine culture growing Candida glabrata, unsure if this is colonization  Currently on micafungin.  Awaiting final results from blood culture  Will consult ID

## 2025-07-05 NOTE — ASSESSMENT & PLAN NOTE
Nutrition consulted. Most recent weight and BMI monitored-     Measurements:  Wt Readings from Last 1 Encounters:   07/05/25 49 kg (108 lb 0.4 oz)   Body mass index is 21.1 kg/m².    Patient has been screened and assessed by RD.    Malnutrition Type:  Context:    Level:      Malnutrition Characteristic Summary:       Interventions/Recommendations (treatment strategy):  1. Recommend initiation of EN regimen of Isosource 1.5 at 10 mL/hr and advance as tolerated to goal rate of 45 mL/hr to provide 1620 kcal, 73 gm pro, and 825 mL free water. Additional FWF per MD. 2. Addition of Bradley BID to promote wound healing. Mix 1 packet of Bradley in  mL water, dissolve well and administer via PEG. Addition of 30-60 mL FWF before and after administration. 3. Monitor weight/labs. 4. RD to follow and monitor nutrition status

## 2025-07-06 PROBLEM — A49.8 CLOSTRIDIUM DIFFICILE INFECTION: Status: ACTIVE | Noted: 2025-07-06

## 2025-07-06 PROBLEM — A49.8 CLOSTRIDIUM DIFFICILE INFECTION: Status: RESOLVED | Noted: 2025-07-06 | Resolved: 2025-07-06

## 2025-07-06 LAB
ANION GAP (OHS): 8 MMOL/L (ref 8–16)
BUN SERPL-MCNC: 12 MG/DL (ref 8–23)
CALCIUM SERPL-MCNC: 8.9 MG/DL (ref 8.7–10.5)
CHLORIDE SERPL-SCNC: 106 MMOL/L (ref 95–110)
CO2 SERPL-SCNC: 20 MMOL/L (ref 23–29)
CREAT SERPL-MCNC: 0.6 MG/DL (ref 0.5–1.4)
ERYTHROCYTE [DISTWIDTH] IN BLOOD BY AUTOMATED COUNT: 15 % (ref 11.5–14.5)
GFR SERPLBLD CREATININE-BSD FMLA CKD-EPI: >60 ML/MIN/1.73/M2
GLUCOSE SERPL-MCNC: 120 MG/DL (ref 70–110)
HCT VFR BLD AUTO: 29.4 % (ref 37–48.5)
HGB BLD-MCNC: 9.4 GM/DL (ref 12–16)
MCH RBC QN AUTO: 32 PG (ref 27–31)
MCHC RBC AUTO-ENTMCNC: 32 G/DL (ref 32–36)
MCV RBC AUTO: 100 FL (ref 82–98)
PLATELET # BLD AUTO: 373 K/UL (ref 150–450)
PMV BLD AUTO: 9.6 FL (ref 9.2–12.9)
POTASSIUM SERPL-SCNC: 4.2 MMOL/L (ref 3.5–5.1)
RBC # BLD AUTO: 2.94 M/UL (ref 4–5.4)
SODIUM SERPL-SCNC: 134 MMOL/L (ref 136–145)
VANCOMYCIN SERPL-MCNC: 11.6 UG/ML (ref ?–80)
VANCOMYCIN TROUGH SERPL-MCNC: 11.5 UG/ML (ref 10–22)
WBC # BLD AUTO: 7.21 K/UL (ref 3.9–12.7)

## 2025-07-06 PROCEDURE — 99900035 HC TECH TIME PER 15 MIN (STAT)

## 2025-07-06 PROCEDURE — 36415 COLL VENOUS BLD VENIPUNCTURE: CPT | Performed by: STUDENT IN AN ORGANIZED HEALTH CARE EDUCATION/TRAINING PROGRAM

## 2025-07-06 PROCEDURE — 94761 N-INVAS EAR/PLS OXIMETRY MLT: CPT

## 2025-07-06 PROCEDURE — 63600175 PHARM REV CODE 636 W HCPCS: Performed by: STUDENT IN AN ORGANIZED HEALTH CARE EDUCATION/TRAINING PROGRAM

## 2025-07-06 PROCEDURE — 80202 ASSAY OF VANCOMYCIN: CPT | Performed by: STUDENT IN AN ORGANIZED HEALTH CARE EDUCATION/TRAINING PROGRAM

## 2025-07-06 PROCEDURE — 25000003 PHARM REV CODE 250: Performed by: STUDENT IN AN ORGANIZED HEALTH CARE EDUCATION/TRAINING PROGRAM

## 2025-07-06 PROCEDURE — 11000001 HC ACUTE MED/SURG PRIVATE ROOM

## 2025-07-06 PROCEDURE — 25000003 PHARM REV CODE 250

## 2025-07-06 PROCEDURE — A4216 STERILE WATER/SALINE, 10 ML: HCPCS | Performed by: STUDENT IN AN ORGANIZED HEALTH CARE EDUCATION/TRAINING PROGRAM

## 2025-07-06 PROCEDURE — 27000207 HC ISOLATION

## 2025-07-06 PROCEDURE — 99223 1ST HOSP IP/OBS HIGH 75: CPT | Mod: ,,, | Performed by: INTERNAL MEDICINE

## 2025-07-06 PROCEDURE — 25000003 PHARM REV CODE 250: Performed by: INTERNAL MEDICINE

## 2025-07-06 PROCEDURE — 63600175 PHARM REV CODE 636 W HCPCS: Performed by: INTERNAL MEDICINE

## 2025-07-06 PROCEDURE — 80048 BASIC METABOLIC PNL TOTAL CA: CPT | Performed by: STUDENT IN AN ORGANIZED HEALTH CARE EDUCATION/TRAINING PROGRAM

## 2025-07-06 PROCEDURE — 63600175 PHARM REV CODE 636 W HCPCS: Performed by: PHYSICIAN ASSISTANT

## 2025-07-06 PROCEDURE — 85027 COMPLETE CBC AUTOMATED: CPT | Performed by: STUDENT IN AN ORGANIZED HEALTH CARE EDUCATION/TRAINING PROGRAM

## 2025-07-06 RX ORDER — METOPROLOL TARTRATE 50 MG/1
50 TABLET ORAL 2 TIMES DAILY
Status: DISCONTINUED | OUTPATIENT
Start: 2025-07-06 | End: 2025-07-09

## 2025-07-06 RX ADMIN — VANCOMYCIN HYDROCHLORIDE 1000 MG: 1 INJECTION, POWDER, LYOPHILIZED, FOR SOLUTION INTRAVENOUS at 05:07

## 2025-07-06 RX ADMIN — MORPHINE SULFATE 2 MG: 4 INJECTION, SOLUTION INTRAMUSCULAR; INTRAVENOUS at 03:07

## 2025-07-06 RX ADMIN — MICAFUNGIN SODIUM 100 MG: 100 INJECTION, POWDER, LYOPHILIZED, FOR SOLUTION INTRAVENOUS at 10:07

## 2025-07-06 RX ADMIN — ACETAMINOPHEN 1000 MG: 500 TABLET, FILM COATED ORAL at 01:07

## 2025-07-06 RX ADMIN — METOPROLOL TARTRATE 50 MG: 50 TABLET, FILM COATED ORAL at 09:07

## 2025-07-06 RX ADMIN — ACETAMINOPHEN 1000 MG: 500 TABLET, FILM COATED ORAL at 09:07

## 2025-07-06 RX ADMIN — ENOXAPARIN SODIUM 50 MG: 60 INJECTION SUBCUTANEOUS at 10:07

## 2025-07-06 RX ADMIN — LEVETIRACETAM 500 MG: 100 INJECTION INTRAVENOUS at 09:07

## 2025-07-06 RX ADMIN — MUPIROCIN: 20 OINTMENT TOPICAL at 10:07

## 2025-07-06 RX ADMIN — ACETAMINOPHEN 1000 MG: 500 TABLET, FILM COATED ORAL at 05:07

## 2025-07-06 RX ADMIN — FAMOTIDINE 20 MG: 40 POWDER, FOR SUSPENSION ORAL at 10:07

## 2025-07-06 RX ADMIN — LEVETIRACETAM 500 MG: 100 INJECTION INTRAVENOUS at 10:07

## 2025-07-06 RX ADMIN — Medication 125 MG: at 05:07

## 2025-07-06 RX ADMIN — METOPROLOL TARTRATE 50 MG: 50 TABLET, FILM COATED ORAL at 01:07

## 2025-07-06 RX ADMIN — FAMOTIDINE 20 MG: 40 POWDER, FOR SUSPENSION ORAL at 09:07

## 2025-07-06 RX ADMIN — ENOXAPARIN SODIUM 50 MG: 60 INJECTION SUBCUTANEOUS at 09:07

## 2025-07-06 RX ADMIN — Medication 125 MG: at 12:07

## 2025-07-06 RX ADMIN — MUPIROCIN: 20 OINTMENT TOPICAL at 09:07

## 2025-07-06 NOTE — PLAN OF CARE
Problem: Skin Injury Risk Increased  Goal: Skin Health and Integrity  Outcome: Progressing     Problem: Adult Inpatient Plan of Care  Goal: Plan of Care Review  Outcome: Progressing  Goal: Patient-Specific Goal (Individualized)  Outcome: Progressing  Goal: Absence of Hospital-Acquired Illness or Injury  Outcome: Progressing  Goal: Optimal Comfort and Wellbeing  Outcome: Progressing  Goal: Readiness for Transition of Care  Outcome: Progressing     Problem: Coping Ineffective  Goal: Effective Coping  Outcome: Progressing     Problem: Wound  Goal: Optimal Coping  Outcome: Progressing  Goal: Optimal Functional Ability  Outcome: Progressing  Goal: Absence of Infection Signs and Symptoms  Outcome: Progressing  Goal: Improved Oral Intake  Outcome: Progressing  Goal: Optimal Pain Control and Function  Outcome: Progressing  Goal: Skin Health and Integrity  Outcome: Progressing  Goal: Optimal Wound Healing  Outcome: Progressing     Problem: Sepsis/Septic Shock  Goal: Optimal Coping  Outcome: Progressing  Goal: Absence of Bleeding  Outcome: Progressing  Goal: Blood Glucose Level Within Targeted Range  Outcome: Progressing  Goal: Absence of Infection Signs and Symptoms  Outcome: Progressing  Goal: Optimal Nutrition Intake  Outcome: Progressing     Problem: Infection  Goal: Absence of Infection Signs and Symptoms  Outcome: Progressing     Problem: Fall Injury Risk  Goal: Absence of Fall and Fall-Related Injury  Outcome: Progressing

## 2025-07-06 NOTE — CONSULTS
"[facility]  Infectious Disease  Consult Note    Patient Name: Lydna Mackenzie  MRN: [id number]  Admission Date: 7/2/2025  Hospital Length of Stay: 4 days  Attending Physician: Abyb Brenner, *  Primary Care Provider: Rut Rosenthal MD     Isolation Status: Special Contact    Patient information was obtained from past medical records and ER records.      Inpatient consult to Infectious Diseases  Consult performed by: Carlos Alberto Paula MD  Consult ordered by: Isiah Rodriguez MD        Assessment/Plan:     ID  * Severe sepsis  Ms. Mackenzie is a 70 yo woman with history of recent CDI, admitted with sepsis. She was started on broad-spectrum abx. CDI testing returned positive (unclear if PCR represents prior infection since te toxin can persist) and she was started on an enteral vancomycin taper. The CNS in cassidy blood is a likely contaminant. She is also being treated for a Candida UTI. ID is consulted for "C diff."    Recommendations  Agree with vancomycin taper  Stop IV Zosyn and vancomycin  Would complete 7 days of micafungin for UTI      Thank you for your consult. I will sign off. Please contact us if you have any additional questions.    Carlos Alberto Paula MD  Infectious Disease  [facility]    Subjective:     Principal Problem: Severe sepsis    HPI: Ms. Mackenzie is a 69 y.o. woman with CHF, history of PE on eliquis, seizure disorder on keppra, previous CVA with residual aphasia and right sided hemiparesis, PEG status presents to the hospital from [facility] after witnessed hypoxia and increased work of breathing. The patient is unable to provide any history due to baseline aphasia. Recently diagnosed with CDI and the patient reportedly completed her vancomycin from previous hospitalization for C diff colitis.  There has been no diarrhea at the nursing home.  In the ED, tachycardic and had a leukocytosis. She was admitted with sepsis and started on V vancomycin, Cefepime and " "metronidazole.  C diff returned with positive antigen and toxin, extended treatment with oral vancomycin initiated. In the meantime, her urine culture growing >100k CFU's of yeast and micafungin was added. Her blood cultures grew CNS, a likely contaminant. ID is consulted for "C diff." The patient opens her eyes but is otherwise non-verbal and in NAD. Nursing reports mucus bowl movements.    Past Medical History:   Diagnosis Date    Anticoagulant long-term use     CHF (congestive heart failure)     Seizures     Stroke        No past surgical history on file.    Review of patient's allergies indicates:  No Known Allergies    Medications:  Medications Prior to Admission   Medication Sig    apixaban (ELIQUIS) 5 mg Tab 5 mg by Per G Tube route 2 (two) times daily.    cyanocobalamin (VITAMIN B-12) 1000 MCG tablet 1,000 mcg by FEEDING TUBE route.    empagliflozin (JARDIANCE) 10 mg tablet 10 mg by Per G Tube route once daily.    FLUoxetine (PROZAC) 20 mg/5 mL (4 mg/mL) solution 40 mg by FEEDING TUBE route.    ipratropium (ATROVENT) 0.02 % nebulizer solution Inhale 0.5 mg into the lungs 4 (four) times daily.    levETIRAcetam (KEPPRA) 500 MG Tab 500 mg by FEEDING TUBE route 2 (two) times daily.    metoprolol tartrate (LOPRESSOR) 25 MG tablet 50 mg by FEEDING TUBE route 2 (two) times daily.    sacubitriL-valsartan (ENTRESTO) 24-26 mg per tablet 1 tablet by FEEDING TUBE route 2 (two) times daily.    traMADoL (ULTRAM) 50 mg tablet Take 50 mg by mouth every 6 (six) hours as needed.    vancomycin (FIRVANQ) 25 mg/mL SolR 5 mLs by Per G Tube route 4 (four) times daily.     Antibiotics (From admission, onward)      Start     Stop Route Frequency Ordered    07/27/25 0900  vancomycin 125 mg/5 mL oral solution 125 mg  (C. difficile Infection (CDI) Treatment Order Panel)        Placed in "Followed by" Linked Group    08/11/25 0859 PER G TUBE Every 3 days 07/03/25 1148    07/20/25 0900  vancomycin 125 mg/5 mL oral solution 125 mg  (C. " "difficile Infection (CDI) Treatment Order Panel)        Placed in "Followed by" Linked Group    07/27/25 0859 PER G TUBE Daily 07/03/25 1148    07/13/25 1200  vancomycin 125 mg/5 mL oral solution 125 mg  (C. difficile Infection (CDI) Treatment Order Panel)        Placed in "Followed by" Linked Group    07/20/25 0859 PER G TUBE Every 12 hours 07/03/25 1148    07/05/25 0515  vancomycin (VANCOCIN) 1,000 mg in 0.9% NaCl 250 mL IVPB (admixture device)         -- IV Every 24 hours (non-standard times) 07/05/25 0412    07/04/25 1245  mupirocin 2 % ointment         07/09/25 0859 Nasl 2 times daily 07/04/25 1139    07/03/25 1300  vancomycin 125 mg/5 mL oral solution 125 mg  (C. difficile Infection (CDI) Treatment Order Panel)        Placed in "Followed by" Linked Group    07/13/25 1159 PER G TUBE Every 6 hours 07/03/25 1148    07/02/25 1505  vancomycin - pharmacy to dose  (vancomycin IVPB (PEDS and ADULTS))        Placed in "And" Linked Group    -- IV pharmacy to manage frequency 07/02/25 1406          Antifungals (From admission, onward)      Start     Stop Route Frequency Ordered    07/03/25 1045  micafungin 100 mg in 0.9% NaCl 100 mL IVPB (MB+)         -- IV Every 24 hours (non-standard times) 07/03/25 0944          Antivirals (From admission, onward)      None             There is no immunization history for the selected administration types on file for this patient.    Family History    None       Social History     Socioeconomic History    Marital status: Unknown     Social Drivers of Health     Financial Resource Strain: Patient Unable To Answer (7/5/2025)    Overall Financial Resource Strain (CARDIA)     Difficulty of Paying Living Expenses: Patient unable to answer   Food Insecurity: Patient Unable To Answer (7/5/2025)    Hunger Vital Sign     Worried About Running Out of Food in the Last Year: Patient unable to answer     Ran Out of Food in the Last Year: Patient unable to answer   Transportation Needs: Patient " Unable To Answer (7/5/2025)    PRAPARE - Transportation     Lack of Transportation (Medical): Patient unable to answer     Lack of Transportation (Non-Medical): Patient unable to answer   Stress: Patient Unable To Answer (7/5/2025)    [employer] [employer] - Occupational Stress Questionnaire     Feeling of Stress : Patient unable to answer   Housing Stability: Patient Unable To Answer (7/5/2025)    Housing Stability Vital Sign     Unable to Pay for Housing in the Last Year: Patient unable to answer     Homeless in the Last Year: Patient unable to answer     Review of Systems   Unable to perform ROS: Dementia     Objective:     Vital Signs (Most Recent):  Temp: 98.5 °F (36.9 °C) (07/06/25 0756)  Pulse: (!) 123 (07/06/25 0900)  Resp: 18 (07/06/25 0821)  BP: (!) 155/79 (07/06/25 0900)  SpO2: 97 % (07/06/25 0821) Vital Signs (24h Range):  Temp:  [98.5 °F (36.9 °C)-100.3 °F (37.9 °C)] 98.5 °F (36.9 °C)  Pulse:  [120-139] 123  Resp:  [18-20] 18  SpO2:  [94 %-100 %] 97 %  BP: (133-175)/(57-79) 155/79     Weight: 49 kg (108 lb 0.4 oz)  Body mass index is 21.1 kg/m².    Estimated Creatinine Clearance: 63.6 mL/min (based on SCr of 0.6 mg/dL).     Physical Exam  Vitals and nursing note reviewed.   Constitutional:       Appearance: Normal appearance.   HENT:      Head: Normocephalic and atraumatic.   Eyes:      Pupils: Pupils are equal, round, and reactive to light.   Pulmonary:      Breath sounds: No wheezing or rhonchi.   Abdominal:      General: There is no distension.      Palpations: There is no mass.      Tenderness: There is no abdominal tenderness. There is no guarding or rebound.   Neurological:      Mental Status: She is alert.          Significant Labs: BMP:   Recent Labs   Lab 07/06/25  0344   *   *   K 4.2      CO2 20*   BUN 12   CREATININE 0.6   CALCIUM 8.9     CBC:   Recent Labs   Lab 07/05/25  0322 07/06/25  0344   WBC 7.27 7.21   HGB 9.6* 9.4*   HCT 29.9* 29.4*     373     Microbiology Results (last 7 days)       Procedure Component Value Units Date/Time    Blood culture x two cultures. Draw prior to antibiotics. [[id number]]  (Normal) Collected: 07/02/25 0852    Order Status: Completed Specimen: Blood from Peripheral, Antecubital, Right Updated: 07/06/25 0901     Blood Culture No Growth After 96 hours    Blood culture [[id number]]  (Normal) Collected: 07/04/25 0455    Order Status: Completed Specimen: Blood Updated: 07/06/25 0600     Blood Culture No Growth After 48 Hours    Blood culture x two cultures. Draw prior to antibiotics. [[id number]]  (Abnormal) Collected: 07/02/25 0917    Order Status: Completed Specimen: Blood from Peripheral, Forearm, Left Updated: 07/05/25 0703     Blood Culture Positive - Aerobic/Pediatric Bottle      Coagulase-negative Staphylococcus species     GRAM STAIN Gram positive cocci in clusters resembling Staph     Comment: Positive - Aerobic/Pediatric Bottle       Narrative:      Organism is a probable contaminant.    Urine culture [[id number]]  (Abnormal) Collected: 07/02/25 1044    Order Status: Completed Specimen: Urine, Catheterized Updated: 07/04/25 0956     Urine Culture >100,000 cfu/ml Candida glabrata     Comment: Treatment of asymptomatic candiduria is not recommended (except for specific populations). Candida isolated in the urine typically represents colonization. If an indwelling urinary catheter is presentit should be removed or replaced.       Clostridium difficile EIA [[id number]]  (Abnormal) Collected: 07/02/25 2333    Order Status: Completed Specimen: Stool Updated: 07/03/25 1144     C. DIFFICILE GDH AG Positive     Clostridioides difficile Toxin A/B Positive    Respiratory Infection Panel (PCR), Nasopharyngeal [[id number]] Collected: 07/02/25 1829    Order Status: Completed Specimen: Nasopharyngeal Swab Updated: 07/03/25 1119     Respiratory Infection Panel Source Nasopharyngeal Swab     Adenovirus Not Detected     Coronavirus  229E, Common Cold Virus Not Detected     Coronavirus HKU1, Common Cold Virus Not Detected     Coronavirus NL63, Common Cold Virus Not Detected     Coronavirus OC43, Common Cold Virus Not Detected     SARS-CoV2 (COVID-19) Qualitative PCR Not Detected     Human Metapneumovirus Not Detected     Human Rhinovirus/Enterovirus Not Detected     Influenza A Not Detected     Influenza B Not Detected     Parainfluenza Virus 1 Not Detected     Parainfluenza Virus 2 Not Detected     Parainfluenza Virus 3 Not Detected     Parainfluenza Virus 4 Not Detected     Respiratory Syncytial Virus Not Detected     Bordetella Parapertussis (SM6447) Not Detected     Bordetella pertussis (ptxP) Not Detected     Chlamydia pneumoniae Not Detected     Mycoplasma pneumoniae Not Detected    Influenza A & B by Molecular [[id number]]  (Normal) Collected: 07/02/25 1829    Order Status: Completed Specimen: Nasal Swab Updated: 07/02/25 1928     INFLUENZA A MOLECULAR Negative     INFLUENZA B MOLECULAR  Negative            Significant Imaging: I have reviewed all pertinent imaging results/findings within the past 24 hours.

## 2025-07-06 NOTE — ASSESSMENT & PLAN NOTE
"Ms. Mackenzie is a 68 yo woman with history of recent CDI, admitted with sepsis. She was started on broad-spectrum abx. CDI testing returned positive (unclear if PCR represents prior infection since te toxin can persist) and she was started on an enteral vancomycin taper. The CNS in cassidy blood is a likely contaminant. She is also being treated for a Candida UTI. ID is consulted for "C diff."    Recommendations  Agree with vancomycin taper  Stop IV Zosyn and vancomycin  Would complete 7 days of micafungin for UTI  "

## 2025-07-06 NOTE — NURSING
Attempt made to notify Dr Whyte of elevated pulse in the 130s via secure chat.  Dr Whyte stated he was not on tonight.

## 2025-07-06 NOTE — NURSING
Pt's heart rate 120's this shift.  This morning it is it increased to 133.  Dr Brenner, notified of elevated heart rate via secure chat.  Stated to get EKG. EKG strip placed in chart.  Pt appears to be in no acute distress.

## 2025-07-06 NOTE — NURSING
[facility] MEWS TRIGGER FOLLOW UP       MEWS Monitoring, Score is: 4  Indication for review: HR 130s    Bedside Nurse, Lali contacted, MD aware/ following, instructed to call [phone] for further concerns or assistance.    Assessed pt at bedside. Pt alert and appears in NAD. Temp 100.3; blankets removed and temp in room lowered. BP, SpO2 stable. Pt gets scheduled tylenol and received prn morphine within last several hours. Pt has been running tachy this admission. In ICU was reaching upper 140s HR. Fluid boluses had helped patient in the past. Spoke with primary RN Lali and AM MD Brenner notified of pt condition. Care ongoing.

## 2025-07-06 NOTE — SUBJECTIVE & OBJECTIVE
Interval History: Patient seen. Still tachycardic. No significant events overnight.     Review of Systems  Objective:     Vital Signs (Most Recent):  Temp: 98 °F (36.7 °C) (07/06/25 1351)  Pulse: (!) 112 (07/06/25 1457)  Resp: 18 (07/06/25 1140)  BP: 130/67 (07/06/25 1352)  SpO2: 96 % (07/06/25 1140) Vital Signs (24h Range):  Temp:  [98 °F (36.7 °C)-100.3 °F (37.9 °C)] 98 °F (36.7 °C)  Pulse:  [112-139] 112  Resp:  [18-20] 18  SpO2:  [94 %-100 %] 96 %  BP: (130-175)/(57-79) 130/67     Weight: 49 kg (108 lb 0.4 oz)  Body mass index is 21.1 kg/m².    Intake/Output Summary (Last 24 hours) at 7/6/2025 1508  Last data filed at 7/6/2025 0718  Gross per 24 hour   Intake 1361 ml   Output 1401 ml   Net -40 ml         Physical Exam  Cardiovascular:      Rate and Rhythm: Tachycardia present.      Pulses: Normal pulses.      Heart sounds: Murmur heard.   Abdominal:      Comments: Gastrostomy tube present   Skin:     Findings: Wound (Sacral decubitus ulcer) present.   Neurological:      Motor: Weakness, atrophy and abnormal muscle tone present.   Psychiatric:         Mood and Affect: Affect is flat.                Significant Labs: All pertinent labs within the past 24 hours have been reviewed.  Recent Lab Results         07/06/25  0344        Anion Gap 8       BUN 12       Calcium 8.9       Chloride 106       CO2 20       Creatinine 0.6       eGFR >60  Comment: Estimated GFR calculated using the CKD-EPI creatinine (2021) equation.       Glucose 120       Hematocrit 29.4       Hemoglobin 9.4       MCH 32.0       MCHC 32.0              MPV 9.6       Platelet Count 373       Potassium 4.2       RBC 2.94       RDW 15.0       Sodium 134       Vancomycin, Random 11.6       Vancomycin-Trough 11.5       WBC 7.21               Significant Imaging: I have reviewed all pertinent imaging results/findings within the past 24 hours.

## 2025-07-06 NOTE — ASSESSMENT & PLAN NOTE
Patient is nonverbal, could not give history of UTI  Urine culture growing Candida glabrata, unsure if this is colonization  Currently on micafungin.  ID recommendations appreciated

## 2025-07-06 NOTE — ASSESSMENT & PLAN NOTE
Patient's blood pressure range in the last 24 hours was: BP  Min: 130/67  Max: 175/76.T  he patient's inpatient anti-hypertensive regimen is listed below:  Current Antihypertensives  , 2 times daily, FEEDING TUBE  metoprolol tartrate (LOPRESSOR) tablet 50 mg, 2 times daily, Per G Tube    Plan  - BP is controlled, no changes needed to their regimen  - home metoprolol/entresto restarted

## 2025-07-06 NOTE — HPI
"Ms. Mackenzie is a 69 y.o. 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 The patient is unable to provide any history due to baseline aphasia. Recently diagnosed with CDI and the patient reportedly completed her vancomycin from previous hospitalization for C diff colitis.  There has been no diarrhea at the nursing home.  In the ED, tachycardic and had a leukocytosis. She was admitted with sepsis and started on V vancomycin, Cefepime and metronidazole.  C diff returned with positive antigen and toxin, extended treatment with oral vancomycin initiated. In the meantime, her urine culture growing >100k CFU's of yeast and micafungin was added. Her blood cultures grew CNS, a likely contaminant. ID is consulted for "C diff." The patient opens her eyes but is otherwise non-verbal and in NAD. Nursing reports mucus bowl movements.  "

## 2025-07-06 NOTE — PROGRESS NOTES
Pharmacokinetic Assessment Follow Up: IV Vancomycin    Vancomycin serum concentration assessment(s):    The trough level was drawn correctly and can be used to guide therapy at this time. The measurement is within the desired definitive target range of 10 to 20 mcg/mL.    Vancomycin Regimen Plan:    Continue regimen to Vancomycin 1000 mg IV every 24 hours with next serum trough concentration measured at 0400 prior to 3rd dose on 7/8/25    Drug levels (last 3 results):  Recent Labs   Lab Result Units 07/04/25  0455 07/05/25 0322 07/06/25  0344   Vancomycin Random ug/ml 10.7 8.4 11.6   Vancomycin Trough ug/ml  --   --  11.5       Pharmacy will continue to follow and monitor vancomycin.    Please contact pharmacy at extension [phone] for questions regarding this assessment.    Thank you for the consult,   Ezra Moser       Patient brief summary:  Lynda Mackenzie is a 69 y.o. female initiated on antimicrobial therapy with IV Vancomycin for treatment of bacteremia    The patient's current regimen is Vancomycin 1000mg q24h    Drug Allergies:   Review of patient's allergies indicates:  No Known Allergies    Actual Body Weight:   49 kg    Renal Function:   Estimated Creatinine Clearance: 63.6 mL/min (based on SCr of 0.6 mg/dL).,     Dialysis Method (if applicable):  N/A    CBC (last 72 hours):  Recent Labs   Lab Result Units 07/04/25  0753 07/05/25 0322 07/06/25  0344   WBC K/uL 8.41 7.27 7.21   HGB gm/dL 8.5* 9.6* 9.4*   HCT % 27.6* 29.9* 29.4*   Platelet Count K/uL 399 395 373   Lymph % % 17.1*  --   --    Mono % % 8.9  --   --    Eos % % 3.1  --   --    Basophil % % 0.2  --   --        Metabolic Panel (last 72 hours):  Recent Labs   Lab Result Units 07/04/25  1607 07/05/25 0322 07/06/25  0344   Sodium mmol/L 141 139 134*   Potassium mmol/L 3.5 3.7 4.2   Chloride mmol/L 109 108 106   CO2 mmol/L 21* 21* 20*   Glucose mg/dL 159* 132* 120*   BUN mg/dL 14 13 12   Creatinine mg/dL 0.6 0.6 0.6       Vancomycin  Administrations:  vancomycin given in the last 96 hours                     vancomycin (VANCOCIN) 1,000 mg in 0.9% NaCl 250 mL IVPB (admixture device) (mg) 1,000 mg New Bag 07/06/25 0524     1,000 mg New Bag 07/05/25 0459    vancomycin 125 mg/5 mL oral solution 125 mg (mg) 125 mg Given 07/06/25 0521     125 mg Given by Other  0021     125 mg Given 07/05/25 1932     125 mg Given  1247     125 mg Given  0616     125 mg Given  0037     125 mg Given 07/04/25 1742     125 mg Given  1208     125 mg Given  0526     125 mg Given  0100     125 mg Given 07/03/25 1744     125 mg Given  1313    vancomycin (VANCOCIN) 500 mg in D5W 100 mL IVPB (MB+) (mg) 500 mg New Bag 07/04/25 0739    vancomycin 750 mg in 0.9% NaCl 250 mL IVPB (admixture device) (mg) 750 mg New Bag 07/03/25 0619    vancomycin 750 mg in 0.9% NaCl 250 mL IVPB (admixture device) (mg) 750 mg New Bag 07/02/25 0945                    Microbiologic Results:  Microbiology Results (last 7 days)       Procedure Component Value Units Date/Time    Blood culture [[id number]]  (Normal) Collected: 07/04/25 0455    Order Status: Completed Specimen: Blood Updated: 07/05/25 1801     Blood Culture No Growth After 36 Hours    Blood culture x two cultures. Draw prior to antibiotics. [[id number]]  (Normal) Collected: 07/02/25 0852    Order Status: Completed Specimen: Blood from Peripheral, Antecubital, Right Updated: 07/05/25 0901     Blood Culture No Growth After 72 Hours    Blood culture x two cultures. Draw prior to antibiotics. [[id number]]  (Abnormal) Collected: 07/02/25 0917    Order Status: Completed Specimen: Blood from Peripheral, Forearm, Left Updated: 07/05/25 0703     Blood Culture Positive - Aerobic/Pediatric Bottle      Coagulase-negative Staphylococcus species     GRAM STAIN Gram positive cocci in clusters resembling Staph     Comment: Positive - Aerobic/Pediatric Bottle       Narrative:      Organism is a probable contaminant.    Urine culture [[id number]]  (Abnormal)  Collected: 07/02/25 1044    Order Status: Completed Specimen: Urine, Catheterized Updated: 07/04/25 0956     Urine Culture >100,000 cfu/ml Candida glabrata     Comment: Treatment of asymptomatic candiduria is not recommended (except for specific populations). Candida isolated in the urine typically represents colonization. If an indwelling urinary catheter is presentit should be removed or replaced.       Clostridium difficile EIA [[id number]]  (Abnormal) Collected: 07/02/25 2333    Order Status: Completed Specimen: Stool Updated: 07/03/25 1144     C. DIFFICILE GDH AG Positive     Clostridioides difficile Toxin A/B Positive    Respiratory Infection Panel (PCR), Nasopharyngeal [[id number]] Collected: 07/02/25 1829    Order Status: Completed Specimen: Nasopharyngeal Swab Updated: 07/03/25 1119     Respiratory Infection Panel Source Nasopharyngeal Swab     Adenovirus Not Detected     Coronavirus 229E, Common Cold Virus Not Detected     Coronavirus HKU1, Common Cold Virus Not Detected     Coronavirus NL63, Common Cold Virus Not Detected     Coronavirus OC43, Common Cold Virus Not Detected     SARS-CoV2 (COVID-19) Qualitative PCR Not Detected     Human Metapneumovirus Not Detected     Human Rhinovirus/Enterovirus Not Detected     Influenza A Not Detected     Influenza B Not Detected     Parainfluenza Virus 1 Not Detected     Parainfluenza Virus 2 Not Detected     Parainfluenza Virus 3 Not Detected     Parainfluenza Virus 4 Not Detected     Respiratory Syncytial Virus Not Detected     Bordetella Parapertussis (TD7789) Not Detected     Bordetella pertussis (ptxP) Not Detected     Chlamydia pneumoniae Not Detected     Mycoplasma pneumoniae Not Detected    Influenza A & B by Molecular [[id number]]  (Normal) Collected: 07/02/25 1829    Order Status: Completed Specimen: Nasal Swab Updated: 07/02/25 1928     INFLUENZA A MOLECULAR Negative     INFLUENZA B MOLECULAR  Negative

## 2025-07-06 NOTE — PROGRESS NOTES
Pharmacokinetic Assessment Follow Up: IV Vancomycin    Vancomycin serum concentration assessment(s):    The trough level was drawn correctly and can be used to guide therapy at this time. The measurement is within the desired definitive target range of 10 to 20 mcg/mL.    Vancomycin Regimen Plan:    Continue regimen to Vancomycin 1000 mg IV every 24 hours with next serum trough concentration measured at 0400 prior to 3rd dose on 7/8/25    Drug levels (last 3 results):  Recent Labs   Lab Result Units 07/04/25  0455 07/05/25 0322 07/06/25  0344   Vancomycin Random ug/ml 10.7 8.4 11.6   Vancomycin Trough ug/ml  --   --  11.5       Pharmacy will continue to follow and monitor vancomycin.    Please contact pharmacy at extension [phone] for questions regarding this assessment.    Thank you for the consult,   Ezra Moser       Patient brief summary:  Lynda Mackenzie is a 69 y.o. female initiated on antimicrobial therapy with IV Vancomycin for treatment of bacteremia    The patient's current regimen is Vancomycin 1000mg q24h    Drug Allergies:   Review of patient's allergies indicates:  No Known Allergies    Actual Body Weight:   49 kg    Renal Function:   Estimated Creatinine Clearance: 63.6 mL/min (based on SCr of 0.6 mg/dL).,     Dialysis Method (if applicable):  N/A    CBC (last 72 hours):  Recent Labs   Lab Result Units 07/04/25  0753 07/05/25 0322 07/06/25  0344   WBC K/uL 8.41 7.27 7.21   HGB gm/dL 8.5* 9.6* 9.4*   HCT % 27.6* 29.9* 29.4*   Platelet Count K/uL 399 395 373   Lymph % % 17.1*  --   --    Mono % % 8.9  --   --    Eos % % 3.1  --   --    Basophil % % 0.2  --   --        Metabolic Panel (last 72 hours):  Recent Labs   Lab Result Units 07/04/25  1607 07/05/25 0322 07/06/25  0344   Sodium mmol/L 141 139 134*   Potassium mmol/L 3.5 3.7 4.2   Chloride mmol/L 109 108 106   CO2 mmol/L 21* 21* 20*   Glucose mg/dL 159* 132* 120*   BUN mg/dL 14 13 12   Creatinine mg/dL 0.6 0.6 0.6       Vancomycin  Administrations:  vancomycin given in the last 96 hours                     vancomycin (VANCOCIN) 1,000 mg in 0.9% NaCl 250 mL IVPB (admixture device) (mg) 1,000 mg New Bag 07/06/25 0524     1,000 mg New Bag 07/05/25 0459    vancomycin 125 mg/5 mL oral solution 125 mg (mg) 125 mg Given 07/06/25 0521     125 mg Given by Other  0021     125 mg Given 07/05/25 1932     125 mg Given  1247     125 mg Given  0616     125 mg Given  0037     125 mg Given 07/04/25 1742     125 mg Given  1208     125 mg Given  0526     125 mg Given  0100     125 mg Given 07/03/25 1744     125 mg Given  1313    vancomycin (VANCOCIN) 500 mg in D5W 100 mL IVPB (MB+) (mg) 500 mg New Bag 07/04/25 0739    vancomycin 750 mg in 0.9% NaCl 250 mL IVPB (admixture device) (mg) 750 mg New Bag 07/03/25 0619    vancomycin 750 mg in 0.9% NaCl 250 mL IVPB (admixture device) (mg) 750 mg New Bag 07/02/25 0945                    Microbiologic Results:  Microbiology Results (last 7 days)       Procedure Component Value Units Date/Time    Blood culture [[id number]]  (Normal) Collected: 07/04/25 0455    Order Status: Completed Specimen: Blood Updated: 07/05/25 1801     Blood Culture No Growth After 36 Hours    Blood culture x two cultures. Draw prior to antibiotics. [[id number]]  (Normal) Collected: 07/02/25 0852    Order Status: Completed Specimen: Blood from Peripheral, Antecubital, Right Updated: 07/05/25 0901     Blood Culture No Growth After 72 Hours    Blood culture x two cultures. Draw prior to antibiotics. [[id number]]  (Abnormal) Collected: 07/02/25 0917    Order Status: Completed Specimen: Blood from Peripheral, Forearm, Left Updated: 07/05/25 0703     Blood Culture Positive - Aerobic/Pediatric Bottle      Coagulase-negative Staphylococcus species     GRAM STAIN Gram positive cocci in clusters resembling Staph     Comment: Positive - Aerobic/Pediatric Bottle       Narrative:      Organism is a probable contaminant.    Urine culture [[id number]]  (Abnormal)  Collected: 07/02/25 1044    Order Status: Completed Specimen: Urine, Catheterized Updated: 07/04/25 0956     Urine Culture >100,000 cfu/ml Candida glabrata     Comment: Treatment of asymptomatic candiduria is not recommended (except for specific populations). Candida isolated in the urine typically represents colonization. If an indwelling urinary catheter is presentit should be removed or replaced.       Clostridium difficile EIA [[id number]]  (Abnormal) Collected: 07/02/25 2333    Order Status: Completed Specimen: Stool Updated: 07/03/25 1144     C. DIFFICILE GDH AG Positive     Clostridioides difficile Toxin A/B Positive    Respiratory Infection Panel (PCR), Nasopharyngeal [[id number]] Collected: 07/02/25 1829    Order Status: Completed Specimen: Nasopharyngeal Swab Updated: 07/03/25 1119     Respiratory Infection Panel Source Nasopharyngeal Swab     Adenovirus Not Detected     Coronavirus 229E, Common Cold Virus Not Detected     Coronavirus HKU1, Common Cold Virus Not Detected     Coronavirus NL63, Common Cold Virus Not Detected     Coronavirus OC43, Common Cold Virus Not Detected     SARS-CoV2 (COVID-19) Qualitative PCR Not Detected     Human Metapneumovirus Not Detected     Human Rhinovirus/Enterovirus Not Detected     Influenza A Not Detected     Influenza B Not Detected     Parainfluenza Virus 1 Not Detected     Parainfluenza Virus 2 Not Detected     Parainfluenza Virus 3 Not Detected     Parainfluenza Virus 4 Not Detected     Respiratory Syncytial Virus Not Detected     Bordetella Parapertussis (OX9415) Not Detected     Bordetella pertussis (ptxP) Not Detected     Chlamydia pneumoniae Not Detected     Mycoplasma pneumoniae Not Detected    Influenza A & B by Molecular [[id number]]  (Normal) Collected: 07/02/25 1829    Order Status: Completed Specimen: Nasal Swab Updated: 07/02/25 1928     INFLUENZA A MOLECULAR Negative     INFLUENZA B MOLECULAR  Negative

## 2025-07-06 NOTE — SUBJECTIVE & OBJECTIVE
"Past Medical History:   Diagnosis Date    Anticoagulant long-term use     CHF (congestive heart failure)     Seizures     Stroke        No past surgical history on file.    Review of patient's allergies indicates:  No Known Allergies    Medications:  Medications Prior to Admission   Medication Sig    apixaban (ELIQUIS) 5 mg Tab 5 mg by Per G Tube route 2 (two) times daily.    cyanocobalamin (VITAMIN B-12) 1000 MCG tablet 1,000 mcg by FEEDING TUBE route.    empagliflozin (JARDIANCE) 10 mg tablet 10 mg by Per G Tube route once daily.    FLUoxetine (PROZAC) 20 mg/5 mL (4 mg/mL) solution 40 mg by FEEDING TUBE route.    ipratropium (ATROVENT) 0.02 % nebulizer solution Inhale 0.5 mg into the lungs 4 (four) times daily.    levETIRAcetam (KEPPRA) 500 MG Tab 500 mg by FEEDING TUBE route 2 (two) times daily.    metoprolol tartrate (LOPRESSOR) 25 MG tablet 50 mg by FEEDING TUBE route 2 (two) times daily.    sacubitriL-valsartan (ENTRESTO) 24-26 mg per tablet 1 tablet by FEEDING TUBE route 2 (two) times daily.    traMADoL (ULTRAM) 50 mg tablet Take 50 mg by mouth every 6 (six) hours as needed.    vancomycin (FIRVANQ) 25 mg/mL SolR 5 mLs by Per G Tube route 4 (four) times daily.     Antibiotics (From admission, onward)      Start     Stop Route Frequency Ordered    07/27/25 0900  vancomycin 125 mg/5 mL oral solution 125 mg  (C. difficile Infection (CDI) Treatment Order Panel)        Placed in "Followed by" Linked Group    08/11/25 0859 PER G TUBE Every 3 days 07/03/25 1148    07/20/25 0900  vancomycin 125 mg/5 mL oral solution 125 mg  (C. difficile Infection (CDI) Treatment Order Panel)        Placed in "Followed by" Linked Group    07/27/25 0859 PER G TUBE Daily 07/03/25 1148    07/13/25 1200  vancomycin 125 mg/5 mL oral solution 125 mg  (C. difficile Infection (CDI) Treatment Order Panel)        Placed in "Followed by" Linked Group    07/20/25 0859 PER G TUBE Every 12 hours 07/03/25 1148    07/05/25 0515  vancomycin (VANCOCIN) " "1,000 mg in 0.9% NaCl 250 mL IVPB (admixture device)         -- IV Every 24 hours (non-standard times) 07/05/25 0412    07/04/25 1245  mupirocin 2 % ointment         07/09/25 0859 Nasl 2 times daily 07/04/25 1139    07/03/25 1300  vancomycin 125 mg/5 mL oral solution 125 mg  (C. difficile Infection (CDI) Treatment Order Panel)        Placed in "Followed by" Linked Group    07/13/25 1159 PER G TUBE Every 6 hours 07/03/25 1148    07/02/25 1505  vancomycin - pharmacy to dose  (vancomycin IVPB (PEDS and ADULTS))        Placed in "And" Linked Group    -- IV pharmacy to manage frequency 07/02/25 1406          Antifungals (From admission, onward)      Start     Stop Route Frequency Ordered    07/03/25 1045  micafungin 100 mg in 0.9% NaCl 100 mL IVPB (MB+)         -- IV Every 24 hours (non-standard times) 07/03/25 0944          Antivirals (From admission, onward)      None             There is no immunization history for the selected administration types on file for this patient.    Family History    None       Social History     Socioeconomic History    Marital status: Unknown     Social Drivers of Health     Financial Resource Strain: Patient Unable To Answer (7/5/2025)    Overall Financial Resource Strain (CARDIA)     Difficulty of Paying Living Expenses: Patient unable to answer   Food Insecurity: Patient Unable To Answer (7/5/2025)    Hunger Vital Sign     Worried About Running Out of Food in the Last Year: Patient unable to answer     Ran Out of Food in the Last Year: Patient unable to answer   Transportation Needs: Patient Unable To Answer (7/5/2025)    PRAPARE - Transportation     Lack of Transportation (Medical): Patient unable to answer     Lack of Transportation (Non-Medical): Patient unable to answer   Stress: Patient Unable To Answer (7/5/2025)    [employer] - Occupational Stress Questionnaire     Feeling of Stress : Patient unable to answer   Housing Stability: Patient Unable To " Answer (7/5/2025)    Housing Stability Vital Sign     Unable to Pay for Housing in the Last Year: Patient unable to answer     Homeless in the Last Year: Patient unable to answer     Review of Systems   Unable to perform ROS: Dementia     Objective:     Vital Signs (Most Recent):  Temp: 98.5 °F (36.9 °C) (07/06/25 0756)  Pulse: (!) 123 (07/06/25 0900)  Resp: 18 (07/06/25 0821)  BP: (!) 155/79 (07/06/25 0900)  SpO2: 97 % (07/06/25 0821) Vital Signs (24h Range):  Temp:  [98.5 °F (36.9 °C)-100.3 °F (37.9 °C)] 98.5 °F (36.9 °C)  Pulse:  [120-139] 123  Resp:  [18-20] 18  SpO2:  [94 %-100 %] 97 %  BP: (133-175)/(57-79) 155/79     Weight: 49 kg (108 lb 0.4 oz)  Body mass index is 21.1 kg/m².    Estimated Creatinine Clearance: 63.6 mL/min (based on SCr of 0.6 mg/dL).     Physical Exam  Vitals and nursing note reviewed.   Constitutional:       Appearance: Normal appearance.   HENT:      Head: Normocephalic and atraumatic.   Eyes:      Pupils: Pupils are equal, round, and reactive to light.   Pulmonary:      Breath sounds: No wheezing or rhonchi.   Abdominal:      General: There is no distension.      Palpations: There is no mass.      Tenderness: There is no abdominal tenderness. There is no guarding or rebound.   Neurological:      Mental Status: She is alert.          Significant Labs: BMP:   Recent Labs   Lab 07/06/25  0344   *   *   K 4.2      CO2 20*   BUN 12   CREATININE 0.6   CALCIUM 8.9     CBC:   Recent Labs   Lab 07/05/25  0322 07/06/25  0344   WBC 7.27 7.21   HGB 9.6* 9.4*   HCT 29.9* 29.4*    373     Microbiology Results (last 7 days)       Procedure Component Value Units Date/Time    Blood culture x two cultures. Draw prior to antibiotics. [[id number]]  (Normal) Collected: 07/02/25 0852    Order Status: Completed Specimen: Blood from Peripheral, Antecubital, Right Updated: 07/06/25 0901     Blood Culture No Growth After 96 hours    Blood culture [[id number]]  (Normal) Collected:  07/04/25 0455    Order Status: Completed Specimen: Blood Updated: 07/06/25 0600     Blood Culture No Growth After 48 Hours    Blood culture x two cultures. Draw prior to antibiotics. [[id number]]  (Abnormal) Collected: 07/02/25 0917    Order Status: Completed Specimen: Blood from Peripheral, Forearm, Left Updated: 07/05/25 0703     Blood Culture Positive - Aerobic/Pediatric Bottle      Coagulase-negative Staphylococcus species     GRAM STAIN Gram positive cocci in clusters resembling Staph     Comment: Positive - Aerobic/Pediatric Bottle       Narrative:      Organism is a probable contaminant.    Urine culture [[id number]]  (Abnormal) Collected: 07/02/25 1044    Order Status: Completed Specimen: Urine, Catheterized Updated: 07/04/25 0956     Urine Culture >100,000 cfu/ml Candida glabrata     Comment: Treatment of asymptomatic candiduria is not recommended (except for specific populations). Candida isolated in the urine typically represents colonization. If an indwelling urinary catheter is presentit should be removed or replaced.       Clostridium difficile EIA [[id number]]  (Abnormal) Collected: 07/02/25 2333    Order Status: Completed Specimen: Stool Updated: 07/03/25 1144     C. DIFFICILE GDH AG Positive     Clostridioides difficile Toxin A/B Positive    Respiratory Infection Panel (PCR), Nasopharyngeal [[id number]] Collected: 07/02/25 1829    Order Status: Completed Specimen: Nasopharyngeal Swab Updated: 07/03/25 1119     Respiratory Infection Panel Source Nasopharyngeal Swab     Adenovirus Not Detected     Coronavirus 229E, Common Cold Virus Not Detected     Coronavirus HKU1, Common Cold Virus Not Detected     Coronavirus NL63, Common Cold Virus Not Detected     Coronavirus OC43, Common Cold Virus Not Detected     SARS-CoV2 (COVID-19) Qualitative PCR Not Detected     Human Metapneumovirus Not Detected     Human Rhinovirus/Enterovirus Not Detected     Influenza A Not Detected     Influenza B Not Detected      Parainfluenza Virus 1 Not Detected     Parainfluenza Virus 2 Not Detected     Parainfluenza Virus 3 Not Detected     Parainfluenza Virus 4 Not Detected     Respiratory Syncytial Virus Not Detected     Bordetella Parapertussis ([id number]) Not Detected     Bordetella pertussis (ptxP) Not Detected     Chlamydia pneumoniae Not Detected     Mycoplasma pneumoniae Not Detected    Influenza A & B by Molecular [[id number]]  (Normal) Collected: 07/02/25 1829    Order Status: Completed Specimen: Nasal Swab Updated: 07/02/25 1928     INFLUENZA A MOLECULAR Negative     INFLUENZA B MOLECULAR  Negative            Significant Imaging: I have reviewed all pertinent imaging results/findings within the past 24 hours.

## 2025-07-06 NOTE — PROGRESS NOTES
[facility] Medicine  Progress Note    Patient Name: Lynda Mackenzie  MRN: [id number]  Patient Class: IP- Inpatient   Admission Date: 7/2/2025  Length of Stay: 4 days  Attending Physician: Abby Brenner, *  Primary Care Provider: Rut Rosenthal MD        Subjective     Principal Problem:Severe sepsis        HPI:  Lynda Mackenzie 69 y.o. female with CHF, history of PE on eliquis, seizure disorder on keppra, previous CVA with residual aphasia and right sided hemiparesis, PEG status presents to the hospital from [facility] after witnessed hypoxia and increased work of breathing. The patient is unable to provide any history due to baseline aphasia.     Discussed with nurse from [facility] she reports this morning patient's respiratory rate was elevated near 30 and she was hypoxic requiring supplemental oxygen causing activation of EMS.  At baseline she is not on supplemental oxygen.  She is nonverbal at baseline.  She has been tolerating her tube feeds at 40 cc an hour continuous with 140cc every 4 hours FWF.  She completed her vancomycin from previous hospitalization for C diff colitis.  There has been no diarrhea at the nursing home.  There have been no witnessed episodes of emesis or seizures.  She has not had a fever.  She receives her Keppra and Eliquis via PEG.    Discussed with patient's daughter KIRK he will confirms DNR status.  She has baseline aphasia and right-sided hemiparesis from previous CVA. She is not on home oxygen.     In the ED, tachycardic near 140 white blood cell count 22 initial lactic acid 3.5 repeat 2.4 UA with white blood cells bacteria initial chest x-ray without acute process, repeat after 1.7 L of IVF with increased conspicuous lung markings.        Overview/Hospital Course:  She was admitted with temp 100F, tachycardic with heart rate in the 140-150, tachypneic with respiratory rate 24-28, and leukocytosis 23k in keeping with sepsis. Started on V  vancomycin, Cefepime and metronidazole.  C diff returned with positive antigen and toxin, extended treatment with oral vancomycin initiated, Urine culture growing >100k CFU's of yeast, Micafungin added for now unsure if this was colonization.  Heart rate not improving with volume resuscitation.  Home dose of metoprolol was restarted  Blood culture with GPC in one bottle, may be contaminant but will keep IV vanc on until it is determined. Does have some discharge around her PEG, unclear if fungal or food discharge, will deescalate with culture results. TSH result within normal limits.  Stool testing was positive for C diff infection.  Was started on enteral vancomycin taper due to lack of clarity on whether this is a present/past infection.    Interval History: Patient seen. Still tachycardic. No significant events overnight.     Review of Systems  Objective:     Vital Signs (Most Recent):  Temp: 98 °F (36.7 °C) (07/06/25 1351)  Pulse: (!) 112 (07/06/25 1457)  Resp: 18 (07/06/25 1140)  BP: 130/67 (07/06/25 1352)  SpO2: 96 % (07/06/25 1140) Vital Signs (24h Range):  Temp:  [98 °F (36.7 °C)-100.3 °F (37.9 °C)] 98 °F (36.7 °C)  Pulse:  [112-139] 112  Resp:  [18-20] 18  SpO2:  [94 %-100 %] 96 %  BP: (130-175)/(57-79) 130/67     Weight: 49 kg (108 lb 0.4 oz)  Body mass index is 21.1 kg/m².    Intake/Output Summary (Last 24 hours) at 7/6/2025 1508  Last data filed at 7/6/2025 0718  Gross per 24 hour   Intake 1361 ml   Output 1401 ml   Net -40 ml         Physical Exam  Cardiovascular:      Rate and Rhythm: Tachycardia present.      Pulses: Normal pulses.      Heart sounds: Murmur heard.   Abdominal:      Comments: Gastrostomy tube present   Skin:     Findings: Wound (Sacral decubitus ulcer) present.   Neurological:      Motor: Weakness, atrophy and abnormal muscle tone present.   Psychiatric:         Mood and Affect: Affect is flat.                Significant Labs: All pertinent labs within the past 24 hours have been  reviewed.  Recent Lab Results         07/06/25  0344        Anion Gap 8       BUN 12       Calcium 8.9       Chloride 106       CO2 20       Creatinine 0.6       eGFR >60  Comment: Estimated GFR calculated using the CKD-EPI creatinine (2021) equation.       Glucose 120       Hematocrit 29.4       Hemoglobin 9.4       MCH 32.0       MCHC 32.0              MPV 9.6       Platelet Count 373       Potassium 4.2       RBC 2.94       RDW 15.0       Sodium 134       Vancomycin, Random 11.6       Vancomycin-Trough 11.5       WBC 7.21               Significant Imaging: I have reviewed all pertinent imaging results/findings within the past 24 hours.      Assessment & Plan  Severe sepsis  Presents with hypoxia, tachycardia, and WBC of 22 from [facility]. Remains tachycardic in the ED despite IVF, and remains on NC. With sacral decubitus without surrounding cellulitis or drainage. Possible source includes urine. Was tachycardic persistently at  during last hospitalization.    Has completed IV vancomycin and Zosyn for sepsis.  Continue oral vancomycin in the setting of Clostridium difficile and Gram-positive cocci  Urine culture growing Candida glabrata.  Currently on micafungin  Stage IV sacral wound could be source of infection as well.    Patient has sepsis with Acute respiratory failure secondary to Urinary Tract Infection.   A review of systems was completed. Patient's sepsis is stable    Current Antibiotics    , 4 times daily, Per G Tube  vancomycin 125 mg/5 mL oral solution 125 mg, Every 6 hours, Per G Tube  vancomycin 125 mg/5 mL oral solution 125 mg, Every 12 hours, Per G Tube  vancomycin 125 mg/5 mL oral solution 125 mg, Daily, Per G Tube  vancomycin 125 mg/5 mL oral solution 125 mg, Every 3 days, Per G Tube    Lactate  Recent Labs   Lab 07/02/25  0852 07/02/25  0901 07/02/25  0906 07/02/25  1133 07/03/25 2021   LACTATE 3.5*  --   --  2.4* 0.9   POCLAC  --  UNAVAILABLE 4.08*  --   --      Culture  Data  Blood Cultures   Blood Culture   Date Value Ref Range Status   07/04/2025 No Growth After 48 Hours  Preliminary   07/02/2025 Positive - Aerobic/Pediatric Bottle (A)  Final   07/02/2025 Coagulase-negative Staphylococcus species (A)  Final   07/02/2025 No Growth After 96 hours  Preliminary      Urine Culture   Urine Culture   Date Value Ref Range Status   07/02/2025 >100,000 cfu/ml Candida glabrata (A)  Final     Comment:     Treatment of asymptomatic candiduria is not recommended (except for specific populations). Candida isolated in the urine typically represents colonization. If an indwelling urinary catheter is presentit should be removed or replaced.      mSOFA  MSOFA Total  Min: 0   Min taken time: 07/06/25 1501  Max: 0   Max taken time: 07/06/25 1501    Plan  - Antibiotics as listed above  - Fluid resuscitation as follows:Contraindicated- Fluid bolus is contraindicated in this patient due to Congestive Heart Failure   - Trend lactate to resolution  - Follow up culture data  - Vasopressors were initiated to maintain MAP >65 due to persistent hypotension after appropriate fluid administration  - The following services were consulted:Palliative Medicine.    Global aphasia  Non-verbal at baseline following her CVA.   Currently on tube feedings.   Aspiration precaution.  Continue range of motion physical therapy    Hemiparesis of right dominant side as late effect of cerebral infarction  Per care everywhere.   Aphasic at baseline per NH following a previous cerebrovascular accident  Continue range of motion physical therapy as tolerated following contact precaution  Confirmed with daughter with residual right sided hemiparesis from previous CVA  Hypertension  Patient's blood pressure range in the last 24 hours was: BP  Min: 130/67  Max: 175/76.T  he patient's inpatient anti-hypertensive regimen is listed below:  Current Antihypertensives  , 2 times daily, FEEDING TUBE  metoprolol tartrate (LOPRESSOR) tablet 50  "mg, 2 times daily, Per G Tube    Plan  - BP is controlled, no changes needed to their regimen  - home metoprolol/entresto restarted  History of CVA (cerebrovascular accident)  With residual non-verbal status and PEG dependent per discussion with NH    Chronic combined systolic and diastolic CHF (congestive heart failure)  Patient has Combined Systolic and Diastolic heart failure that is Chronic. On presentation their CHF was well compensated. Most recent BNP and echo results are listed below.  No results for input(s): "BNP" in the last 72 hours.  Latest ECHO  No results found for this or any previous visit.    Current Heart Failure Medications  , Daily, Per G Tube  , 2 times daily, FEEDING TUBE    Plan  - Monitor strict I&Os and daily weights.    - Place on telemetry  - Low sodium diet  - Place on fluid restriction of 1.5 L.   - Cardiology has not been consulted  - The patient's volume status is at their baseline  - continue metoprolol and entresto for GDMT.     Echo in care everywhere with EF of 35 to 40% and grade 1 diastolic dysfunction.           PEG (percutaneous endoscopic gastrostomy) status  Patient noted to have a percutaneous endoscopic gastrostomy tube in place. I have personally inspected the tube.Tube was placed prior to this admission   There are no signs of drainage or infection around the site.   The tube is patent. Medications have converted to liquid form if available.    Routine care to be done by wound care and nursing staff.     On Jevity 1.5 continuous per [facility] at 40 cc/hr with 120 FWF every 4. Nutrition consulted  Will hold resuming tube feedings today given receieved almost 2L of IVF in ED with CHF and repeat x-ray with increased lung markings    Advanced care planning/counseling discussion  Advance Care Planning     Date: 07/02/2025    Code Status  In light of the patients advanced and life limiting illness,I engaged the the family and healthcare power of   in a voluntary " conversation about the patient's preferences for care  at the very end of life. The patient wishes to have a natural, peaceful death.  Along those lines, the patient does not wish to have CPR or other invasive treatments performed when her heart and/or breathing stops. I communicated to the family and healthcare power of   that a DNR order would be placed in her medical record to reflect this preference.    A total of 16 min was spent on advance care planning, goals of care discussion, emotional support, formulating and communicating prognosis and exploring burden/benefit of various approaches of treatment. This discussion occurred on a fully voluntary basis with the verbal consent of the patient and/or family.     Discussed with KIRK who is also director of nursing at NH where patient resides. Confirmed DNR status. Palliative care consulted       Seizures  Convert home keppra to IV while hospitalized  Cefepime has been discontinued.  Avoid tramadol and other seizure lowering meds  History of Clostridioides difficile colitis  Initially diagnosed 6/2025.   Confirmed with NH completed,   Stool sample obtain positive for C diff antigen and toxin  Started on extended vancomycin.  History of pulmonary embolism  Currently on Lovenox  CT PE protocol was repeated given  hypoxia and persistent tachycardia despite volume resuscitation   Repeat CT PE protocol showed no new pulmonary emboli  Confirmed with NH has been receiving apixaban  Continue Lovenox for DVT prophylaxis.  Severe protein-calorie malnutrition  Nutrition consulted. Most recent weight and BMI monitored-     Measurements:  Wt Readings from Last 1 Encounters:   07/05/25 49 kg (108 lb 0.4 oz)   Body mass index is 21.1 kg/m².    Patient has been screened and assessed by RD.    Malnutrition Type:  Context:    Level:      Malnutrition Characteristic Summary:       Interventions/Recommendations (treatment strategy):  1. Recommend initiation of EN regimen of  Isosource 1.5 at 10 mL/hr and advance as tolerated to goal rate of 45 mL/hr to provide 1620 kcal, 73 gm pro, and 825 mL free water. Additional FWF per MD. 2. Addition of Bradley BID to promote wound healing. Mix 1 packet of Bradley in  mL water, dissolve well and administer via PEG. Addition of 30-60 mL FWF before and after administration. 3. Monitor weight/labs. 4. RD to follow and monitor nutrition status    Open wound of skin  Wound care consult for wound dressing.  Multiple ulcers, worse-stage IV sacral wound  Acute cystitis without hematuria  Patient is nonverbal, could not give history of UTI  Urine culture growing Candida glabrata, unsure if this is colonization  Currently on micafungin.  ID recommendations appreciated  VTE Risk Mitigation (From admission, onward)           Ordered     enoxaparin injection 50 mg  Every 12 hours         07/05/25 1228     IP VTE HIGH RISK PATIENT  Once         07/02/25 1344     Place sequential compression device  Until discontinued         07/02/25 1344     Place DEBBY hose  Until discontinued         07/02/25 1344                    Discharge Planning   LORENA:      Code Status: DNR   Medical Readiness for Discharge Date:   Discharge Plan A: Skilled Nursing Facility                        Abby Brenner MD  Department of Hospital Medicine   [facility]

## 2025-07-06 NOTE — NURSING
[facility]  Nurses Note -- 4 Eyes      7/6/2025       Skin assessed on: Q Shift      [] No Pressure Injuries Present    [x]Prevention Measures Documented    [x] Yes LDA  for Pressure Injury Previously documented     [] Yes New Pressure Injury Discovered   [] LDA for New Pressure Injury Added      Attending RN:  Quirino Rivera RN     Second RN:  Jessica RONDON RN

## 2025-07-06 NOTE — ASSESSMENT & PLAN NOTE
Presents with hypoxia, tachycardia, and WBC of 22 from [facility]. Remains tachycardic in the ED despite IVF, and remains on NC. With sacral decubitus without surrounding cellulitis or drainage. Possible source includes urine. Was tachycardic persistently at  during last hospitalization.    Has completed IV vancomycin and Zosyn for sepsis.  Continue oral vancomycin in the setting of Clostridium difficile and Gram-positive cocci  Urine culture growing Candida glabrata.  Currently on micafungin  Stage IV sacral wound could be source of infection as well.    Patient has sepsis with Acute respiratory failure secondary to Urinary Tract Infection.   A review of systems was completed. Patient's sepsis is stable    Current Antibiotics    , 4 times daily, Per G Tube  vancomycin 125 mg/5 mL oral solution 125 mg, Every 6 hours, Per G Tube  vancomycin 125 mg/5 mL oral solution 125 mg, Every 12 hours, Per G Tube  vancomycin 125 mg/5 mL oral solution 125 mg, Daily, Per G Tube  vancomycin 125 mg/5 mL oral solution 125 mg, Every 3 days, Per G Tube    Lactate  Recent Labs   Lab 07/02/25  0852 07/02/25  0901 07/02/25  0906 07/02/25  1133 07/03/25 2021   LACTATE 3.5*  --   --  2.4* 0.9   POCLAC  --  UNAVAILABLE 4.08*  --   --      Culture Data  Blood Cultures   Blood Culture   Date Value Ref Range Status   07/04/2025 No Growth After 48 Hours  Preliminary   07/02/2025 Positive - Aerobic/Pediatric Bottle (A)  Final   07/02/2025 Coagulase-negative Staphylococcus species (A)  Final   07/02/2025 No Growth After 96 hours  Preliminary      Urine Culture   Urine Culture   Date Value Ref Range Status   07/02/2025 >100,000 cfu/ml Candida glabrata (A)  Final     Comment:     Treatment of asymptomatic candiduria is not recommended (except for specific populations). Candida isolated in the urine typically represents colonization. If an indwelling urinary catheter is presentit should be removed or replaced.      mSOFA  MSOFA Total   Min: 0   Min taken time: 07/06/25 1501  Max: 0   Max taken time: 07/06/25 1501    Plan  - Antibiotics as listed above  - Fluid resuscitation as follows:Contraindicated- Fluid bolus is contraindicated in this patient due to Congestive Heart Failure   - Trend lactate to resolution  - Follow up culture data  - Vasopressors were initiated to maintain MAP >65 due to persistent hypotension after appropriate fluid administration  - The following services were consulted:Palliative Medicine.

## 2025-07-07 LAB
ALBUMIN SERPL BCP-MCNC: 1.6 G/DL (ref 3.5–5.2)
ALP SERPL-CCNC: 105 UNIT/L (ref 40–150)
ALT SERPL W/O P-5'-P-CCNC: 26 UNIT/L (ref 10–44)
ANION GAP (OHS): 8 MMOL/L (ref 8–16)
AST SERPL-CCNC: 58 UNIT/L (ref 11–45)
BACTERIA BLD CULT: NORMAL
BILIRUB SERPL-MCNC: 0.2 MG/DL (ref 0.1–1)
BUN SERPL-MCNC: 14 MG/DL (ref 8–23)
CALCIUM SERPL-MCNC: 9.1 MG/DL (ref 8.7–10.5)
CHLORIDE SERPL-SCNC: 105 MMOL/L (ref 95–110)
CO2 SERPL-SCNC: 22 MMOL/L (ref 23–29)
CREAT SERPL-MCNC: 0.6 MG/DL (ref 0.5–1.4)
ERYTHROCYTE [DISTWIDTH] IN BLOOD BY AUTOMATED COUNT: 15.2 % (ref 11.5–14.5)
GFR SERPLBLD CREATININE-BSD FMLA CKD-EPI: >60 ML/MIN/1.73/M2
GLUCOSE SERPL-MCNC: 122 MG/DL (ref 70–110)
HCT VFR BLD AUTO: 28.2 % (ref 37–48.5)
HGB BLD-MCNC: 9.1 GM/DL (ref 12–16)
MCH RBC QN AUTO: 31.8 PG (ref 27–31)
MCHC RBC AUTO-ENTMCNC: 32.3 G/DL (ref 32–36)
MCV RBC AUTO: 99 FL (ref 82–98)
PLATELET # BLD AUTO: 391 K/UL (ref 150–450)
PMV BLD AUTO: 9.5 FL (ref 9.2–12.9)
POTASSIUM SERPL-SCNC: 4.3 MMOL/L (ref 3.5–5.1)
PROT SERPL-MCNC: 6.7 GM/DL (ref 6–8.4)
RBC # BLD AUTO: 2.86 M/UL (ref 4–5.4)
SODIUM SERPL-SCNC: 135 MMOL/L (ref 136–145)
WBC # BLD AUTO: 9.05 K/UL (ref 3.9–12.7)

## 2025-07-07 PROCEDURE — 25000003 PHARM REV CODE 250: Performed by: STUDENT IN AN ORGANIZED HEALTH CARE EDUCATION/TRAINING PROGRAM

## 2025-07-07 PROCEDURE — 80053 COMPREHEN METABOLIC PANEL: CPT

## 2025-07-07 PROCEDURE — 25000003 PHARM REV CODE 250

## 2025-07-07 PROCEDURE — 99900035 HC TECH TIME PER 15 MIN (STAT)

## 2025-07-07 PROCEDURE — 25000003 PHARM REV CODE 250: Performed by: HOSPITALIST

## 2025-07-07 PROCEDURE — 63600175 PHARM REV CODE 636 W HCPCS: Performed by: STUDENT IN AN ORGANIZED HEALTH CARE EDUCATION/TRAINING PROGRAM

## 2025-07-07 PROCEDURE — 27000207 HC ISOLATION

## 2025-07-07 PROCEDURE — A4216 STERILE WATER/SALINE, 10 ML: HCPCS | Performed by: STUDENT IN AN ORGANIZED HEALTH CARE EDUCATION/TRAINING PROGRAM

## 2025-07-07 PROCEDURE — 11000001 HC ACUTE MED/SURG PRIVATE ROOM

## 2025-07-07 PROCEDURE — 25000003 PHARM REV CODE 250: Performed by: INTERNAL MEDICINE

## 2025-07-07 PROCEDURE — 63600175 PHARM REV CODE 636 W HCPCS: Performed by: PHYSICIAN ASSISTANT

## 2025-07-07 PROCEDURE — 63600175 PHARM REV CODE 636 W HCPCS: Performed by: HOSPITALIST

## 2025-07-07 PROCEDURE — 36415 COLL VENOUS BLD VENIPUNCTURE: CPT

## 2025-07-07 PROCEDURE — 94761 N-INVAS EAR/PLS OXIMETRY MLT: CPT

## 2025-07-07 PROCEDURE — 85027 COMPLETE CBC AUTOMATED: CPT | Performed by: STUDENT IN AN ORGANIZED HEALTH CARE EDUCATION/TRAINING PROGRAM

## 2025-07-07 RX ORDER — ACETAMINOPHEN 500 MG
1000 TABLET ORAL EVERY 6 HOURS PRN
Status: DISCONTINUED | OUTPATIENT
Start: 2025-07-07 | End: 2025-07-09 | Stop reason: HOSPADM

## 2025-07-07 RX ORDER — FLUOXETINE 20 MG/5ML
40 SOLUTION ORAL DAILY
Status: DISCONTINUED | OUTPATIENT
Start: 2025-07-07 | End: 2025-07-09 | Stop reason: HOSPADM

## 2025-07-07 RX ADMIN — SACUBITRIL AND VALSARTAN 1 TABLET: 24; 26 TABLET, FILM COATED ORAL at 09:07

## 2025-07-07 RX ADMIN — LEVETIRACETAM 500 MG: 100 INJECTION INTRAVENOUS at 09:07

## 2025-07-07 RX ADMIN — MUPIROCIN: 20 OINTMENT TOPICAL at 09:07

## 2025-07-07 RX ADMIN — Medication 125 MG: at 12:07

## 2025-07-07 RX ADMIN — METOPROLOL TARTRATE 50 MG: 50 TABLET, FILM COATED ORAL at 09:07

## 2025-07-07 RX ADMIN — ENOXAPARIN SODIUM 50 MG: 60 INJECTION SUBCUTANEOUS at 09:07

## 2025-07-07 RX ADMIN — FAMOTIDINE 20 MG: 40 POWDER, FOR SUSPENSION ORAL at 09:07

## 2025-07-07 RX ADMIN — Medication 125 MG: at 06:07

## 2025-07-07 RX ADMIN — APIXABAN 5 MG: 5 TABLET, FILM COATED ORAL at 09:07

## 2025-07-07 RX ADMIN — FLUOXETINE 40 MG: 20 SOLUTION ORAL at 12:07

## 2025-07-07 RX ADMIN — Medication 125 MG: at 05:07

## 2025-07-07 RX ADMIN — SACUBITRIL AND VALSARTAN 1 TABLET: 24; 26 TABLET, FILM COATED ORAL at 12:07

## 2025-07-07 RX ADMIN — MICAFUNGIN SODIUM 100 MG: 100 INJECTION, POWDER, LYOPHILIZED, FOR SOLUTION INTRAVENOUS at 09:07

## 2025-07-07 RX ADMIN — EMPAGLIFLOZIN 10 MG: 10 TABLET, FILM COATED ORAL at 12:07

## 2025-07-07 RX ADMIN — ACETAMINOPHEN 1000 MG: 500 TABLET, FILM COATED ORAL at 05:07

## 2025-07-07 NOTE — PROGRESS NOTES
[facility] Medicine  Progress Note    Patient Name: Lynda Mackenzie  MRN: [id number]  Patient Class: IP- Inpatient   Admission Date: 7/2/2025  Length of Stay: 5 days  Attending Physician: Hailee Taylor MD  Primary Care Provider: Rut Rosenthal MD        Subjective     Principal Problem:Severe sepsis        HPI:  Lynda Mackenzie 69 y.o. female with CHF, history of PE on eliquis, seizure disorder on keppra, previous CVA with residual aphasia and right sided hemiparesis, PEG status presents to the hospital from [facility] after witnessed hypoxia and increased work of breathing. The patient is unable to provide any history due to baseline aphasia.     Discussed with nurse from [facility] she reports this morning patient's respiratory rate was elevated near 30 and she was hypoxic requiring supplemental oxygen causing activation of EMS.  At baseline she is not on supplemental oxygen.  She is nonverbal at baseline.  She has been tolerating her tube feeds at 40 cc an hour continuous with 140cc every 4 hours FWF.  She completed her vancomycin from previous hospitalization for C diff colitis.  There has been no diarrhea at the nursing home.  There have been no witnessed episodes of emesis or seizures.  She has not had a fever.  She receives her Keppra and Eliquis via PEG.    Discussed with patient's daughter KIRK he will confirms DNR status.  She has baseline aphasia and right-sided hemiparesis from previous CVA. She is not on home oxygen.     In the ED, tachycardic near 140 white blood cell count 22 initial lactic acid 3.5 repeat 2.4 UA with white blood cells bacteria initial chest x-ray without acute process, repeat after 1.7 L of IVF with increased conspicuous lung markings.        Overview/Hospital Course:  Ms Lynda Mackenzie is a 69 y.o. woman with recent CVA with aphasia, PEG in place, R sided weakness who was admitted with sepsis. Diagnosed with Cdiff and Candida UTI. Continues on  treatment with PO vanc (end 8/9/25) and IV micafungin (end 7/9/25).     Interval History: Lying in bed, awake, does not communicate.     Review of Systems   Unable to perform ROS: Patient nonverbal     Objective:     Vital Signs (Most Recent):  Temp: 99.3 °F (37.4 °C) (07/07/25 0746)  Pulse: (!) 117 (07/07/25 1115)  Resp: 17 (07/07/25 0815)  BP: (!) 143/72 (07/07/25 0924)  SpO2: 99 % (07/07/25 0815) Vital Signs (24h Range):  Temp:  [96.9 °F (36.1 °C)-99.3 °F (37.4 °C)] 99.3 °F (37.4 °C)  Pulse:  [] 117  Resp:  [17-22] 17  SpO2:  [95 %-100 %] 99 %  BP: (130-149)/(67-75) 143/72     Weight: 49 kg (108 lb 0.4 oz)  Body mass index is 21.1 kg/m².    Intake/Output Summary (Last 24 hours) at 7/7/2025 1119  Last data filed at 7/7/2025 0611  Gross per 24 hour   Intake 740 ml   Output 2300 ml   Net -1560 ml         Physical Exam  Vitals and nursing note reviewed.   Constitutional:       General: She is not in acute distress.     Appearance: She is not toxic-appearing.   HENT:      Head:      Comments: Temporal wasting  Cardiovascular:      Rate and Rhythm: Regular rhythm. Tachycardia present.   Pulmonary:      Effort: Pulmonary effort is normal.      Breath sounds: Normal breath sounds.      Comments: RA  Abdominal:      Comments: PEG in place with tube feeds infusing   Musculoskeletal:      Right lower leg: No edema.      Left lower leg: No edema.   Skin:     General: Skin is warm and dry.   Neurological:      Mental Status: She is alert. Mental status is at baseline.      Comments: Does not speak, does not follow commands               Significant Labs: All pertinent labs within the past 24 hours have been reviewed.    Significant Imaging: I have reviewed all pertinent imaging results/findings within the past 24 hours.      Assessment & Plan  Severe sepsis  Presents with hypoxia, tachycardia, and WBC of 22 from [facility]. Remains tachycardic in the ED despite IVF, and remains on NC. With sacral decubitus  without surrounding cellulitis or drainage. Possible source includes urine. Was tachycardic persistently at [facility] during last hospitalization.    Has completed IV vancomycin and Zosyn for sepsis.  Continue oral vancomycin in the setting of Clostridium difficile  Urine culture growing Candida glabrata.  Currently on micafungin- end date 7/9/25  Stage IV sacral wound could be source of infection as well. Wound care consulted  Global aphasia  Non-verbal at baseline following her CVA.   Currently on tube feedings.   Aspiration precaution.  Continue range of motion physical therapy  - at SNF prior to admit- plan to return back there     Hemiparesis of right dominant side as late effect of cerebral infarction  Per care everywhere.   Aphasic at baseline per NH following a previous cerebrovascular accident  Continue range of motion physical therapy as tolerated following contact precaution  Confirmed with daughter with residual right sided hemiparesis from previous CVA  - goal to go back to SNF upon discharge   Hypertension  Patient's blood pressure range in the last 24 hours was: BP  Min: 130/67  Max: 149/75.T  he patient's inpatient anti-hypertensive regimen is listed below:  Current Antihypertensives  , 2 times daily, FEEDING TUBE  metoprolol tartrate (LOPRESSOR) tablet 50 mg, 2 times daily, Per G Tube    Plan  - BP is controlled, no changes needed to their regimen  - home metoprolol/entresto restarted  History of CVA (cerebrovascular accident)  With residual non-verbal status and PEG dependent per discussion with NH    Chronic combined systolic and diastolic CHF (congestive heart failure)  Patient has Combined Systolic and Diastolic heart failure that is Chronic. On presentation their CHF was well compensated.   Echo  7/2/25    Left Ventricle: The left ventricle is normal in size. Normal wall thickness. Regional wall motion abnormalities present. Basal to mid inferolateral, inferior and inferoseptal wall akinesis. There is  severely reduced systolic function with a visually estimated ejection fraction of 25 - 30%. Grade I diastolic dysfunction.    Right Ventricle: The right ventricle is normal in size measuring 2.8 cm. Systolic function is normal.    Mitral Valve: There is mild regurgitation.    Tricuspid Valve: There is mild regurgitation.    Pulmonary Artery: The estimated pulmonary artery systolic pressure is 26 mmHg.    IVC/SVC: Normal venous pressure at 3 mmHg.        Current Heart Failure Medications  , Daily, Per G Tube  , 2 times daily, FEEDING TUBE  sacubitriL-valsartan 24-26 mg per tablet 1 tablet, 2 times daily, Per G Tube  empagliflozin (Jardiance) tablet 10 mg, Daily, Per G Tube    Plan  - Monitor strict I&Os and daily weights.    - Place on telemetry  - Low sodium diet  - Place on fluid restriction of 1.5 L.   - Cardiology has not been consulted  - The patient's volume status is at their baseline  - continue metoprolol, entresto, jardiance for GDMT.         PEG (percutaneous endoscopic gastrostomy) status  Patient noted to have a percutaneous endoscopic gastrostomy tube in place. I have personally inspected the tube.Tube was placed prior to this admission   There are no signs of drainage or infection around the site.   The tube is patent. Medications have converted to liquid form if available.    Routine care to be done by wound care and nursing staff.     On Jevity 1.5 continuous per [facility] at 40 cc/hr with 120 FWF every 4. Nutrition consulted    Advanced care planning/counseling discussion  Advance Care Planning     Date: 07/02/2025    Code Status  In light of the patients advanced and life limiting illness,I engaged the the family and healthcare power of   in a voluntary conversation about the patient's preferences for care  at the very end of life. The patient wishes to have a natural, peaceful death.  Along those lines, the patient does not wish to have CPR or other invasive treatments performed when her  heart and/or breathing stops. I communicated to the family and healthcare power of   that a DNR order would be placed in her medical record to reflect this preference.    A total of 16 min was spent on advance care planning, goals of care discussion, emotional support, formulating and communicating prognosis and exploring burden/benefit of various approaches of treatment. This discussion occurred on a fully voluntary basis with the verbal consent of the patient and/or family.     Discussed with KIRK who is also director of nursing at NH where patient resides. Confirmed DNR status. Palliative care consulted       Seizures  Convert home keppra to IV while hospitalized  Cefepime has been discontinued.  Avoid tramadol and other seizure lowering meds  History of Clostridioides difficile colitis  Initially diagnosed 6/2025.   Confirmed with NH completed,   Started on extended vancomycin due to persistent positive test.  History of pulmonary embolism  CT PE protocol was repeated given  hypoxia and persistent tachycardia despite volume resuscitation   Repeat CT PE protocol showed no new pulmonary emboli  - resumed home lovenox   Severe protein-calorie malnutrition  Nutrition consulted. Most recent weight and BMI monitored-     Measurements:  Wt Readings from Last 1 Encounters:   07/05/25 49 kg (108 lb 0.4 oz)   Body mass index is 21.1 kg/m².    Patient has been screened and assessed by RD.    Malnutrition Type:  Context:    Level:      Malnutrition Characteristic Summary:       Interventions/Recommendations (treatment strategy):  1. Recommend initiation of EN regimen of Isosource 1.5 at 10 mL/hr and advance as tolerated to goal rate of 45 mL/hr to provide 1620 kcal, 73 gm pro, and 825 mL free water. Additional FWF per MD. 2. Addition of Bradley BID to promote wound healing. Mix 1 packet of Bradley in  mL water, dissolve well and administer via PEG. Addition of 30-60 mL FWF before and after administration. 3.  Monitor weight/labs. 4. RD to follow and monitor nutrition status    Open wound of skin  Wound care consult for wound dressing.  Multiple ulcers, worse-stage IV sacral wound  Acute cystitis without hematuria  Patient is nonverbal, could not give history of UTI  Urine culture growing Candida glabrata, unsure if this is colonization  Currently on micafungin- end date 7/9/25  ID recommendations appreciated  VTE Risk Mitigation (From admission, onward)           Ordered     apixaban tablet 5 mg  2 times daily         07/07/25 1124     IP VTE HIGH RISK PATIENT  Once         07/02/25 1344     Place sequential compression device  Until discontinued         07/02/25 1344     Place DEBBY hose  Until discontinued         07/02/25 1344                    Discharge Planning   LORENA: 7/9/2025     Code Status: DNR   Medical Readiness for Discharge Date:   Discharge Plan A: Skilled Nursing Facility        3:17 PM Called daughter/HCPOA. Plan for DC after micafungin dose on Wed AM.        Hailee Taylor MD  Department of Hospital Medicine   [facility]

## 2025-07-07 NOTE — ASSESSMENT & PLAN NOTE
CT PE protocol was repeated given  hypoxia and persistent tachycardia despite volume resuscitation   Repeat CT PE protocol showed no new pulmonary emboli  - resumed home lovenox

## 2025-07-07 NOTE — ASSESSMENT & PLAN NOTE
Non-verbal at baseline following her CVA.   Currently on tube feedings.   Aspiration precaution.  Continue range of motion physical therapy  - at SNF prior to admit- plan to return back there

## 2025-07-07 NOTE — CONSULTS
[facility] - Telemetry  Wound Care  W[facility]    Patient Name:  Lynda Mackenzie   MRN:  [id number]  Date: 7/7/2025  Diagnosis: Severe sepsis    History:     Past Medical History:   Diagnosis Date    Anticoagulant long-term use     CHF (congestive heart failure)     Seizures     Stroke        Social History[1]    Precautions:     Allergies as of 07/02/2025    (No Known Allergies)       WO Assessment Details/Treatment     Active Problem List with Overview Notes    Diagnosis Date Noted    Acute cystitis without hematuria 07/04/2025    Severe protein-calorie malnutrition 07/03/2025    Open wound of skin 07/03/2025    History of CVA (cerebrovascular accident) 07/02/2025    Chronic combined systolic and diastolic CHF (congestive heart failure) 07/02/2025    Severe sepsis 07/02/2025    PEG (percutaneous endoscopic gastrostomy) status 07/02/2025    Advanced care planning/counseling discussion 07/02/2025    Seizures 07/02/2025    History of Clostridioides difficile colitis 07/02/2025    History of pulmonary embolism 07/02/2025    Global aphasia 04/29/2025    Hemiparesis of right dominant side as late effect of cerebral infarction 04/29/2025    Hypertension 04/29/2025      Consulted for sacral wound  Discussed with nursing. WO nurse consult 7/3 and treatment plan developed for patient. Wound care per nursing.  Reconsult WO nurse as needed.    07/07/2025       [1]   Social History  Socioeconomic History    Marital status: Unknown     Social Drivers of Health     Financial Resource Strain: Patient Unable To Answer (7/5/2025)    Overall Financial Resource Strain (CARDIA)     Difficulty of Paying Living Expenses: Patient unable to answer   Food Insecurity: Patient Unable To Answer (7/5/2025)    Hunger Vital Sign     Worried About Running Out of Food in the Last Year: Patient unable to answer     Ran Out of Food in the Last Year: Patient unable to answer   Transportation Needs: Patient Unable To Answer (7/5/2025)    PRAPARE -  Transportation     Lack of Transportation (Medical): Patient unable to answer     Lack of Transportation (Non-Medical): Patient unable to answer   Stress: Patient Unable To Answer (7/5/2025)    [employer] - Occupational Stress Questionnaire     Feeling of Stress : Patient unable to answer   Housing Stability: Patient Unable To Answer (7/5/2025)    Housing Stability Vital Sign     Unable to Pay for Housing in the Last Year: Patient unable to answer     Homeless in the Last Year: Patient unable to answer

## 2025-07-07 NOTE — ASSESSMENT & PLAN NOTE
Patient is nonverbal, could not give history of UTI  Urine culture growing Candida glabrata, unsure if this is colonization  Currently on micafungin- end date 7/9/25  ID recommendations appreciated

## 2025-07-07 NOTE — PLAN OF CARE
Problem: Skin Injury Risk Increased  Goal: Skin Health and Integrity  Outcome: Progressing     Problem: Adult Inpatient Plan of Care  Goal: Plan of Care Review  Outcome: Progressing  Goal: Patient-Specific Goal (Individualized)  Outcome: Progressing  Goal: Absence of Hospital-Acquired Illness or Injury  Outcome: Progressing  Goal: Optimal Comfort and Wellbeing  Outcome: Progressing  Goal: Readiness for Transition of Care  Outcome: Progressing     Problem: Skin Injury Risk Increased  Goal: Skin Health and Integrity  Outcome: Progressing     Problem: Adult Inpatient Plan of Care  Goal: Plan of Care Review  Outcome: Progressing  Goal: Patient-Specific Goal (Individualized)  Outcome: Progressing  Goal: Absence of Hospital-Acquired Illness or Injury  Outcome: Progressing  Goal: Optimal Comfort and Wellbeing  Outcome: Progressing  Goal: Readiness for Transition of Care  Outcome: Progressing     Problem: Coping Ineffective  Goal: Effective Coping  Outcome: Progressing     Problem: Wound  Goal: Optimal Coping  Outcome: Progressing  Goal: Optimal Functional Ability  Outcome: Progressing  Goal: Absence of Infection Signs and Symptoms  Outcome: Progressing  Goal: Improved Oral Intake  Outcome: Progressing  Goal: Optimal Pain Control and Function  Outcome: Progressing  Goal: Skin Health and Integrity  Outcome: Progressing  Goal: Optimal Wound Healing  Outcome: Progressing     Problem: Sepsis/Septic Shock  Goal: Optimal Coping  Outcome: Progressing  Goal: Absence of Bleeding  Outcome: Progressing  Goal: Blood Glucose Level Within Targeted Range  Outcome: Progressing  Goal: Absence of Infection Signs and Symptoms  Outcome: Progressing  Goal: Optimal Nutrition Intake  Outcome: Progressing     Problem: Infection  Goal: Absence of Infection Signs and Symptoms  Outcome: Progressing     Problem: Fall Injury Risk  Goal: Absence of Fall and Fall-Related Injury  Outcome: Progressing

## 2025-07-07 NOTE — NURSING
PER handoff received from SHERIF Harper. Pt calmly awake in bed. NAD noted. No signs/symptoms of pain. Fall and safety precautions maintained. Bed alarm activated and audible. Bed locked in lowest position, with side rails up x3. Call bell and personal items within reach.    [facility]  Nurses Note -- 4 Eyes      7/7/2025       Skin assessed on: Q Shift      [] No Pressure Injuries Present    [x]Prevention Measures Documented    [x] Yes LDA  for Pressure Injury Previously documented     [] Yes New Pressure Injury Discovered   [] LDA for New Pressure Injury Added      Attending RN:  Paulette Caldwell RN     Second RN:  SHERIF Harper

## 2025-07-07 NOTE — ASSESSMENT & PLAN NOTE
Presents with hypoxia, tachycardia, and WBC of 22 from [facility]. Remains tachycardic in the ED despite IVF, and remains on NC. With sacral decubitus without surrounding cellulitis or drainage. Possible source includes urine. Was tachycardic persistently at  during last hospitalization.    Has completed IV vancomycin and Zosyn for sepsis.  Continue oral vancomycin in the setting of Clostridium difficile  Urine culture growing Candida glabrata.  Currently on micafungin- end date 7/9/25  Stage IV sacral wound could be source of infection as well. Wound care consulted

## 2025-07-07 NOTE — ASSESSMENT & PLAN NOTE
Per care everywhere.   Aphasic at baseline per NH following a previous cerebrovascular accident  Continue range of motion physical therapy as tolerated following contact precaution  Confirmed with daughter with residual right sided hemiparesis from previous CVA  - goal to go back to SNF upon discharge

## 2025-07-07 NOTE — ASSESSMENT & PLAN NOTE
Patient's blood pressure range in the last 24 hours was: BP  Min: 130/67  Max: 149/75.T  he patient's inpatient anti-hypertensive regimen is listed below:  Current Antihypertensives  , 2 times daily, FEEDING TUBE  metoprolol tartrate (LOPRESSOR) tablet 50 mg, 2 times daily, Per G Tube    Plan  - BP is controlled, no changes needed to their regimen  - home metoprolol/entresto restarted

## 2025-07-07 NOTE — SUBJECTIVE & OBJECTIVE
Interval History: Lying in bed, awake, does not communicate.     Review of Systems   Unable to perform ROS: Patient nonverbal     Objective:     Vital Signs (Most Recent):  Temp: 99.3 °F (37.4 °C) (07/07/25 0746)  Pulse: (!) 117 (07/07/25 1115)  Resp: 17 (07/07/25 0815)  BP: (!) 143/72 (07/07/25 0924)  SpO2: 99 % (07/07/25 0815) Vital Signs (24h Range):  Temp:  [96.9 °F (36.1 °C)-99.3 °F (37.4 °C)] 99.3 °F (37.4 °C)  Pulse:  [] 117  Resp:  [17-22] 17  SpO2:  [95 %-100 %] 99 %  BP: (130-149)/(67-75) 143/72     Weight: 49 kg (108 lb 0.4 oz)  Body mass index is 21.1 kg/m².    Intake/Output Summary (Last 24 hours) at 7/7/2025 1119  Last data filed at 7/7/2025 0611  Gross per 24 hour   Intake 740 ml   Output 2300 ml   Net -1560 ml         Physical Exam  Vitals and nursing note reviewed.   Constitutional:       General: She is not in acute distress.     Appearance: She is not toxic-appearing.   HENT:      Head:      Comments: Temporal wasting  Cardiovascular:      Rate and Rhythm: Regular rhythm. Tachycardia present.   Pulmonary:      Effort: Pulmonary effort is normal.      Breath sounds: Normal breath sounds.      Comments: RA  Abdominal:      Comments: PEG in place with tube feeds infusing   Musculoskeletal:      Right lower leg: No edema.      Left lower leg: No edema.   Skin:     General: Skin is warm and dry.   Neurological:      Mental Status: She is alert. Mental status is at baseline.      Comments: Does not speak, does not follow commands               Significant Labs: All pertinent labs within the past 24 hours have been reviewed.    Significant Imaging: I have reviewed all pertinent imaging results/findings within the past 24 hours.

## 2025-07-07 NOTE — ASSESSMENT & PLAN NOTE
Patient noted to have a percutaneous endoscopic gastrostomy tube in place. I have personally inspected the tube.Tube was placed prior to this admission   There are no signs of drainage or infection around the site.   The tube is patent. Medications have converted to liquid form if available.    Routine care to be done by wound care and nursing staff.     On Jevity 1.5 continuous per [facility] at 40 cc/hr with 120 FWF every 4. Nutrition consulted

## 2025-07-07 NOTE — PLAN OF CARE
Changes in medical condition or discharge plan:    No change to discharge plan. Pt will return to [facility] when medically stable & they will take her if she has C. Diff.    Does patient need new DME? no    Follow up appts needed: TBD    Medically stable: no    Estimated Discharge Date: 7/9 07/07/25 1505   Discharge Reassessment   Assessment Type Discharge Planning Reassessment   Did the patient's condition or plan change since previous assessment? No   Discharge Plan discussed with: Adult children   Communicated LORENA with patient/caregiver Yes   Discharge Plan A Skilled Nursing Facility   Discharge Plan B Return to Nursing Home   DME Needed Upon Discharge  none   Transition of Care Barriers None   Why the patient remains in the hospital Requires continued medical care   Post-Acute Status   Discharge Delays None known at this time

## 2025-07-07 NOTE — ASSESSMENT & PLAN NOTE
Patient has Combined Systolic and Diastolic heart failure that is Chronic. On presentation their CHF was well compensated.   Echo  7/2/25    Left Ventricle: The left ventricle is normal in size. Normal wall thickness. Regional wall motion abnormalities present. Basal to mid inferolateral, inferior and inferoseptal wall akinesis. There is severely reduced systolic function with a visually estimated ejection fraction of 25 - 30%. Grade I diastolic dysfunction.    Right Ventricle: The right ventricle is normal in size measuring 2.8 cm. Systolic function is normal.    Mitral Valve: There is mild regurgitation.    Tricuspid Valve: There is mild regurgitation.    Pulmonary Artery: The estimated pulmonary artery systolic pressure is 26 mmHg.    IVC/SVC: Normal venous pressure at 3 mmHg.        Current Heart Failure Medications  , Daily, Per G Tube  , 2 times daily, FEEDING TUBE  sacubitriL-valsartan 24-26 mg per tablet 1 tablet, 2 times daily, Per G Tube  empagliflozin (Jardiance) tablet 10 mg, Daily, Per G Tube    Plan  - Monitor strict I&Os and daily weights.    - Place on telemetry  - Low sodium diet  - Place on fluid restriction of 1.5 L.   - Cardiology has not been consulted  - The patient's volume status is at their baseline  - continue metoprolol, entresto, jardiance for GDMT.

## 2025-07-07 NOTE — ASSESSMENT & PLAN NOTE
Initially diagnosed 6/2025.   Confirmed with NH completed,   Started on extended vancomycin due to persistent positive test.

## 2025-07-08 LAB
BACTERIA #/AREA URNS AUTO: NORMAL /HPF
BILIRUB UR QL STRIP.AUTO: NEGATIVE
CLARITY UR: CLEAR
COLOR UR AUTO: YELLOW
GLUCOSE UR QL STRIP: ABNORMAL
HGB UR QL STRIP: NEGATIVE
KETONES UR QL STRIP: NEGATIVE
LEUKOCYTE ESTERASE UR QL STRIP: NEGATIVE
MICROSCOPIC COMMENT: NORMAL
NITRITE UR QL STRIP: NEGATIVE
PH UR STRIP: 6 [PH]
PROT UR QL STRIP: NEGATIVE
RBC #/AREA URNS AUTO: 0 /HPF (ref 0–4)
SP GR UR STRIP: 1.01
UROBILINOGEN UR STRIP-ACNC: NEGATIVE EU/DL
WBC #/AREA URNS AUTO: 2 /HPF (ref 0–5)
YEAST UR QL AUTO: NORMAL /HPF

## 2025-07-08 PROCEDURE — 87040 BLOOD CULTURE FOR BACTERIA: CPT | Performed by: HOSPITALIST

## 2025-07-08 PROCEDURE — 25000003 PHARM REV CODE 250: Performed by: HOSPITALIST

## 2025-07-08 PROCEDURE — 63600175 PHARM REV CODE 636 W HCPCS: Performed by: PHYSICIAN ASSISTANT

## 2025-07-08 PROCEDURE — 97162 PT EVAL MOD COMPLEX 30 MIN: CPT

## 2025-07-08 PROCEDURE — 25000003 PHARM REV CODE 250: Performed by: INTERNAL MEDICINE

## 2025-07-08 PROCEDURE — 99900035 HC TECH TIME PER 15 MIN (STAT)

## 2025-07-08 PROCEDURE — A4216 STERILE WATER/SALINE, 10 ML: HCPCS | Performed by: STUDENT IN AN ORGANIZED HEALTH CARE EDUCATION/TRAINING PROGRAM

## 2025-07-08 PROCEDURE — 63600175 PHARM REV CODE 636 W HCPCS: Performed by: HOSPITALIST

## 2025-07-08 PROCEDURE — 25000003 PHARM REV CODE 250: Performed by: STUDENT IN AN ORGANIZED HEALTH CARE EDUCATION/TRAINING PROGRAM

## 2025-07-08 PROCEDURE — 63600175 PHARM REV CODE 636 W HCPCS: Performed by: STUDENT IN AN ORGANIZED HEALTH CARE EDUCATION/TRAINING PROGRAM

## 2025-07-08 PROCEDURE — 97535 SELF CARE MNGMENT TRAINING: CPT

## 2025-07-08 PROCEDURE — 94761 N-INVAS EAR/PLS OXIMETRY MLT: CPT

## 2025-07-08 PROCEDURE — 81003 URINALYSIS AUTO W/O SCOPE: CPT | Performed by: HOSPITALIST

## 2025-07-08 PROCEDURE — 36415 COLL VENOUS BLD VENIPUNCTURE: CPT | Performed by: HOSPITALIST

## 2025-07-08 PROCEDURE — 25000003 PHARM REV CODE 250

## 2025-07-08 PROCEDURE — 97165 OT EVAL LOW COMPLEX 30 MIN: CPT

## 2025-07-08 PROCEDURE — 11000001 HC ACUTE MED/SURG PRIVATE ROOM

## 2025-07-08 PROCEDURE — 27000207 HC ISOLATION

## 2025-07-08 RX ADMIN — MUPIROCIN: 20 OINTMENT TOPICAL at 09:07

## 2025-07-08 RX ADMIN — ACETAMINOPHEN 1000 MG: 500 TABLET ORAL at 05:07

## 2025-07-08 RX ADMIN — MICAFUNGIN SODIUM 100 MG: 100 INJECTION, POWDER, LYOPHILIZED, FOR SOLUTION INTRAVENOUS at 10:07

## 2025-07-08 RX ADMIN — Medication 125 MG: at 12:07

## 2025-07-08 RX ADMIN — APIXABAN 5 MG: 5 TABLET, FILM COATED ORAL at 09:07

## 2025-07-08 RX ADMIN — FAMOTIDINE 20 MG: 40 POWDER, FOR SUSPENSION ORAL at 09:07

## 2025-07-08 RX ADMIN — Medication 125 MG: at 11:07

## 2025-07-08 RX ADMIN — METOPROLOL TARTRATE 50 MG: 50 TABLET, FILM COATED ORAL at 09:07

## 2025-07-08 RX ADMIN — SACUBITRIL AND VALSARTAN 1 TABLET: 24; 26 TABLET, FILM COATED ORAL at 09:07

## 2025-07-08 RX ADMIN — Medication 125 MG: at 06:07

## 2025-07-08 RX ADMIN — EMPAGLIFLOZIN 10 MG: 10 TABLET, FILM COATED ORAL at 09:07

## 2025-07-08 RX ADMIN — LEVETIRACETAM 500 MG: 100 INJECTION INTRAVENOUS at 10:07

## 2025-07-08 RX ADMIN — FLUOXETINE 40 MG: 20 SOLUTION ORAL at 09:07

## 2025-07-08 RX ADMIN — LEVETIRACETAM 500 MG: 100 INJECTION INTRAVENOUS at 09:07

## 2025-07-08 NOTE — ASSESSMENT & PLAN NOTE
Presents with hypoxia, tachycardia, and WBC of 22 from [facility]. Remains tachycardic in the ED despite IVF, and remains on NC. With sacral decubitus without surrounding cellulitis or drainage. Possible source includes urine. Was tachycardic persistently at  during last hospitalization.    - Has completed IV vancomycin and Zosyn for sepsis.    - Continue oral vancomycin in the setting of Clostridium difficile  - Urine culture growing Candida glabrata.  Currently on micafungin- end date 7/9/25  - Stage IV sacral wound- Wound care consulted

## 2025-07-08 NOTE — PLAN OF CARE
Discharge Recommendations: Low Intensity with restorative care in [facility] nursing care  Problem: Physical Therapy  Goal: Physical Therapy Goal  Description: PT Eval and Discharge   Outcome: Met

## 2025-07-08 NOTE — SUBJECTIVE & OBJECTIVE
Interval History: Fever x1 overnight. No new symptoms. She is awake but not interactive.     Review of Systems   Unable to perform ROS: Patient nonverbal     Objective:     Vital Signs (Most Recent):  Temp: 97.4 °F (36.3 °C) (07/08/25 1211)  Pulse: 90 (07/08/25 1211)  Resp: 18 (07/08/25 1211)  BP: 100/62 (07/08/25 1211)  SpO2: (!) 94 % (07/08/25 1211) Vital Signs (24h Range):  Temp:  [97.4 °F (36.3 °C)-101.3 °F (38.5 °C)] 97.4 °F (36.3 °C)  Pulse:  [] 90  Resp:  [14-20] 18  SpO2:  [94 %-98 %] 94 %  BP: (100-138)/(62-83) 100/62     Weight: 49 kg (108 lb 0.4 oz)  Body mass index is 21.1 kg/m².    Intake/Output Summary (Last 24 hours) at 7/8/2025 1331  Last data filed at 7/8/2025 0954  Gross per 24 hour   Intake 200 ml   Output 800 ml   Net -600 ml         Physical Exam  Vitals and nursing note reviewed.   Constitutional:       General: She is not in acute distress.     Appearance: She is not toxic-appearing.   HENT:      Head:      Comments: Temporal wasting  Cardiovascular:      Rate and Rhythm: Normal rate and regular rhythm.   Pulmonary:      Effort: Pulmonary effort is normal.      Breath sounds: Normal breath sounds.      Comments: RA  Abdominal:      Comments: PEG in place    Musculoskeletal:      Right lower leg: No edema.      Left lower leg: No edema.   Skin:     General: Skin is warm and dry.   Neurological:      Mental Status: She is alert. Mental status is at baseline.      Comments: Does not speak, does not follow commands               Significant Labs: All pertinent labs within the past 24 hours have been reviewed.    Significant Imaging: I have reviewed all pertinent imaging results/findings within the past 24 hours.

## 2025-07-08 NOTE — PT/OT/SLP EVAL
Occupational Therapy Evaluation      Name: Lynda Mackenzie  MRN: [id number]  Admitting Diagnosis: Severe sepsis  Recent Surgery: * No surgery found *      Recommendations:     Discharge Recommendations: Moderate Intensity Therapy (for OT and SLP)  Level of Assistance Recommended: 24 hours significant assistance and 24 hours supervision  Discharge Equipment Recommendations: none  Barriers to discharge: None    Assessment:     Lynda Mackenzie is a 69 y.o. female with a medical diagnosis of Severe sepsis. She presents with performance deficits affecting function including weakness, impaired endurance, impaired self care skills, impaired functional mobility, gait instability, impaired balance, impaired cognition, visual deficits, decreased upper extremity function, decreased lower extremity function, decreased safety awareness, decreased ROM, impaired joint extensibility, abnormal tone, impaired coordination. Patient was lying on right side and in trunk and neck flexion. She grimaced with all bed mobility, but could sit EOB with total assistance for ~2 min. With head upright and right arm propped on the 2 pillows. She held the bed rail with left arm and held herself upright for the 2 min and then needed total assistance to lie down again. Occupational therapy repositioned her into supine and applied pressure relief boots to prevent lower extremity contractures.     Rehab Prognosis: Good; patient would benefit from acute OT services to address these deficits and reach maximum level of function.    Plan:     Patient to be seen 3 x/week to address the above listed problems via self-care/home management, therapeutic activities, therapeutic exercises, neuromuscular re-education  Plan of Care Expires: 07/15/25  Plan of Care Reviewed with: patient    Subjective     Chief Complaint: patient aphasic, but grimaced with bed mobility   Patient Comments/Goals: patient cannot name goals and no one present today   Pain/Comfort:  Pain  Rating 1: other (see comments) (pt. is globally aphasic but grimaces when moving in bed)    Patients cultural, spiritual, Jain conflicts given the current situation: no    Social History:  Living Environment: Patient lives in a nursing home in  with unknown  Prior Level of Function: Prior to admission, patient requires assistance with ADLs including all adls and iadls   Roles and Routines: Patient was not driving and not working prior to admission.  Equipment Used at Home: wheelchair, hospital bed  DME owned (not currently used): none  Assistance Upon Discharge: facility staff    Objective:     Communicated with RNSofie, prior to session. Patient found HOB elevated and right sidelying with mandujano catheter, PEG Tube, pressure relief boots, telemetry upon OT entry to room.    General Precautions: Standard, aspiration, aphasia, fall, seizure, special contact   Orthopedic Precautions: N/A   Braces: N/A    Respiratory Status: Room air    Occupational Performance    Gait belt applied - No    Bed Mobility:   Rolling/Turning to Left with dependence  Rolling/Turning to Right with total assistance  Scooting to HOB in supine: dependence  Scooting anteriorly to EOB to have both feet planted on floor: dependence  Supine to sit from right side of bed with total assistance  Sit to Supine with dependence on right  side of bed    Functional Mobility/Transfers:  She sat EOB ~5 min. Total     Activities of Daily Living:  Grooming: total assistance to wash face and hands     Cognitive/Visual Perceptual:  Cognitive/Psychosocial Skills:    -     Oriented to: unable to assess, patient non-verbal and unable to assess due to aphasia  -     Follows Commands/attention: Inattentive  -     Communication: expressive aphasia and receptive aphasia  -     Memory: Impaired STM, Impaired LTM, and Poor immediate recall  -     Safety awareness/insight to disability: impaired  -     Mood/Affect/Coping skills/emotional control: Flat  affect  Visual/Perceptual:    -     Impaired tracking, saccades, and severe inattention to right side      Physical Exam:  Balance:    -     Sitting: total assistance  -     Standing: MAGALI   Postural examination/scapula alignment:    -       Rounded shoulders  -       Forward head  -       Kyphosis  Skin integrity: Visible skin intact  Edema:  None noted  Sensation:    -       Impaired  kinesthesia total  and proprioception total   Motor Planning: Impaired: right upper extremity and lower extremity   Dominant hand: Right  Upper Extremity Range of Motion:     -       Right Upper Extremity: none   -       Left Upper Extremity: WFL  Upper Extremity Strength:    -       Right Upper Extremity: MAGALI but no AROM  -       Left Upper Extremity: WFL   Strength:    -       Right Upper Extremity: none   -       Left Upper Extremity: WNL  Fine Motor Coordination:    -       Impaired  right hand no arom   Gross motor coordination:   hemiplegia/paresis    AMPA 6 Click ADL:  AMPAC Total Score: 6    Treatment & Education:  No one present for education today       Patient not clear to transfer with RN/PCT.    Patient left supine with all lines intact, call button in reach, RN notified, bed alarm on, and pressure boots donned and with pillows in b/w knees at end of session to prevent flexion contractures  .    GOALS:   Multidisciplinary Problems       Occupational Therapy Goals          Problem: Occupational Therapy    Goal Priority Disciplines Outcome Interventions   Occupational Therapy Goal     OT, PT/OT Ongoing    Description: Goals to be met by: 7/15/25     Patient will increase functional independence with ADLs by performing:    Sitting at edge of bed x5 minutes with Maximum Assistance to do wash face  Groom at bed level with max assist.   Visually track to right side with max assist.                          DME Justifications:  No DME recommended requiring DME justifications    History:     Past Medical History:   Diagnosis  Date    Anticoagulant long-term use     CHF (congestive heart failure)     Seizures     Stroke        No past surgical history on file.    Time Tracking:     OT Date of Treatment: 07/08/25  OT Start Time: 1115  OT Stop Time: 1145  OT Total Time (min): 30 min    Billable Minutes: Evaluation 15  and Self Care/Home Management 15     7/8/2025

## 2025-07-08 NOTE — PLAN OF CARE
Problem: Occupational Therapy  Goal: Occupational Therapy Goal  Description: Goals to be met by: 7/15/25     Patient will increase functional independence with ADLs by performing:    Sitting at edge of bed x5 minutes with Maximum Assistance to do wash face  Groom at bed level with max assist.   Visually track to right side with max assist.     Outcome: Ongoing   Patient recommended for return to NH care with moderate intensity for occupational therapy and Speech therapy. No DME recommended. Occupational therapy to see 2-3x/week.

## 2025-07-08 NOTE — PROGRESS NOTES
[facility] Medicine  Progress Note    Patient Name: Lynda Mackenzie  MRN: [id number]  Patient Class: IP- Inpatient   Admission Date: 7/2/2025  Length of Stay: 6 days  Attending Physician: Hailee Taylor MD  Primary Care Provider: Rut Rosenthal MD        Subjective     Principal Problem:Severe sepsis        HPI:  Lynda Mackenzie 69 y.o. female with CHF, history of PE on eliquis, seizure disorder on keppra, previous CVA with residual aphasia and right sided hemiparesis, PEG status presents to the hospital from [facility] after witnessed hypoxia and increased work of breathing. The patient is unable to provide any history due to baseline aphasia.     Discussed with nurse from [facility] she reports this morning patient's respiratory rate was elevated near 30 and she was hypoxic requiring supplemental oxygen causing activation of EMS.  At baseline she is not on supplemental oxygen.  She is nonverbal at baseline.  She has been tolerating her tube feeds at 40 cc an hour continuous with 140cc every 4 hours FWF.  She completed her vancomycin from previous hospitalization for C diff colitis.  There has been no diarrhea at the nursing home.  There have been no witnessed episodes of emesis or seizures.  She has not had a fever.  She receives her Keppra and Eliquis via PEG.    Discussed with patient's daughter KIKR he will confirms DNR status.  She has baseline aphasia and right-sided hemiparesis from previous CVA. She is not on home oxygen.     In the ED, tachycardic near 140 white blood cell count 22 initial lactic acid 3.5 repeat 2.4 UA with white blood cells bacteria initial chest x-ray without acute process, repeat after 1.7 L of IVF with increased conspicuous lung markings.        Overview/Hospital Course:  Ms Lynda Mackenzie is a 69 y.o. woman with recent CVA with aphasia, PEG in place, R sided weakness who was admitted with sepsis. Diagnosed with Cdiff and Candida UTI. Continues on  treatment with PO vanc (end 8/9/25) and IV micafungin (end 7/9/25).     Interval History: Fever x1 overnight. No new symptoms. She is awake but not interactive.     Review of Systems   Unable to perform ROS: Patient nonverbal     Objective:     Vital Signs (Most Recent):  Temp: 97.4 °F (36.3 °C) (07/08/25 1211)  Pulse: 90 (07/08/25 1211)  Resp: 18 (07/08/25 1211)  BP: 100/62 (07/08/25 1211)  SpO2: (!) 94 % (07/08/25 1211) Vital Signs (24h Range):  Temp:  [97.4 °F (36.3 °C)-101.3 °F (38.5 °C)] 97.4 °F (36.3 °C)  Pulse:  [] 90  Resp:  [14-20] 18  SpO2:  [94 %-98 %] 94 %  BP: (100-138)/(62-83) 100/62     Weight: 49 kg (108 lb 0.4 oz)  Body mass index is 21.1 kg/m².    Intake/Output Summary (Last 24 hours) at 7/8/2025 1331  Last data filed at 7/8/2025 0954  Gross per 24 hour   Intake 200 ml   Output 800 ml   Net -600 ml         Physical Exam  Vitals and nursing note reviewed.   Constitutional:       General: She is not in acute distress.     Appearance: She is not toxic-appearing.   HENT:      Head:      Comments: Temporal wasting  Cardiovascular:      Rate and Rhythm: Normal rate and regular rhythm.   Pulmonary:      Effort: Pulmonary effort is normal.      Breath sounds: Normal breath sounds.      Comments: RA  Abdominal:      Comments: PEG in place    Musculoskeletal:      Right lower leg: No edema.      Left lower leg: No edema.   Skin:     General: Skin is warm and dry.   Neurological:      Mental Status: She is alert. Mental status is at baseline.      Comments: Does not speak, does not follow commands               Significant Labs: All pertinent labs within the past 24 hours have been reviewed.    Significant Imaging: I have reviewed all pertinent imaging results/findings within the past 24 hours.      Assessment & Plan  Severe sepsis  Presents with hypoxia, tachycardia, and WBC of 22 from [facility]. Remains tachycardic in the ED despite IVF, and remains on NC. With sacral decubitus without  surrounding cellulitis or drainage. Possible source includes urine. Was tachycardic persistently at [facility] during last hospitalization.    - Has completed IV vancomycin and Zosyn for sepsis.    - Continue oral vancomycin in the setting of Clostridium difficile  - Urine culture growing Candida glabrata.  Currently on micafungin- end date 7/9/25  - Stage IV sacral wound- Wound care consulted  Global aphasia  Non-verbal at baseline following her CVA.   Currently on tube feedings.   Aspiration precaution.  Continue range of motion physical therapy  - at SNF prior to admit- plan to return back there     Hemiparesis of right dominant side as late effect of cerebral infarction  Per care everywhere.   Aphasic at baseline per NH following a previous cerebrovascular accident  Continue range of motion physical therapy as tolerated following contact precaution  Confirmed with daughter with residual right sided hemiparesis from previous CVA  - goal to go back to SNF upon discharge   Hypertension  Patient's blood pressure range in the last 24 hours was: BP  Min: 100/62  Max: 138/76.T  he patient's inpatient anti-hypertensive regimen is listed below:  Current Antihypertensives  , 2 times daily, FEEDING TUBE  metoprolol tartrate (LOPRESSOR) tablet 50 mg, 2 times daily, Per G Tube    Plan  - BP is controlled, no changes needed to their regimen  - home metoprolol/entresto restarted  History of CVA (cerebrovascular accident)  With residual non-verbal status and PEG dependent per discussion with NH    Chronic combined systolic and diastolic CHF (congestive heart failure)  Patient has Combined Systolic and Diastolic heart failure that is Chronic. On presentation their CHF was well compensated.   Echo  7/2/25    Left Ventricle: The left ventricle is normal in size. Normal wall thickness. Regional wall motion abnormalities present. Basal to mid inferolateral, inferior and inferoseptal wall akinesis. There is severely reduced systolic function  with a visually estimated ejection fraction of 25 - 30%. Grade I diastolic dysfunction.    Right Ventricle: The right ventricle is normal in size measuring 2.8 cm. Systolic function is normal.    Mitral Valve: There is mild regurgitation.    Tricuspid Valve: There is mild regurgitation.    Pulmonary Artery: The estimated pulmonary artery systolic pressure is 26 mmHg.    IVC/SVC: Normal venous pressure at 3 mmHg.        Current Heart Failure Medications  , Daily, Per G Tube  , 2 times daily, FEEDING TUBE  sacubitriL-valsartan 24-26 mg per tablet 1 tablet, 2 times daily, Per G Tube  empagliflozin (Jardiance) tablet 10 mg, Daily, Per G Tube    Plan  - Monitor strict I&Os and daily weights.    - Place on telemetry  - Low sodium diet  - Place on fluid restriction of 1.5 L.   - Cardiology has not been consulted  - The patient's volume status is at their baseline  - continue metoprolol, entresto, jardiance for GDMT.         PEG (percutaneous endoscopic gastrostomy) status  Patient noted to have a percutaneous endoscopic gastrostomy tube in place. I have personally inspected the tube.Tube was placed prior to this admission   There are no signs of drainage or infection around the site.   The tube is patent. Medications have converted to liquid form if available.    Routine care to be done by wound care and nursing staff.     On Jevity 1.5 continuous per [facility] at 40 cc/hr with 120 FWF every 4. Nutrition consulted    Advanced care planning/counseling discussion  Advance Care Planning     Date: 07/02/2025    Code Status  In light of the patients advanced and life limiting illness,I engaged the the family and healthcare power of   in a voluntary conversation about the patient's preferences for care  at the very end of life. The patient wishes to have a natural, peaceful death.  Along those lines, the patient does not wish to have CPR or other invasive treatments performed when her heart and/or breathing stops. I  communicated to the family and healthcare power of   that a DNR order would be placed in her medical record to reflect this preference.    A total of 16 min was spent on advance care planning, goals of care discussion, emotional support, formulating and communicating prognosis and exploring burden/benefit of various approaches of treatment. This discussion occurred on a fully voluntary basis with the verbal consent of the patient and/or family.     Discussed with KIRK who is also director of nursing at NH where patient resides. Confirmed DNR status. Palliative care consulted       Seizures  Convert home keppra to IV while hospitalized  Cefepime has been discontinued.  Avoid tramadol and other seizure lowering meds  History of Clostridioides difficile colitis  Initially diagnosed 6/2025.   Confirmed with NH completed,   Started on extended vancomycin due to persistent positive test.  History of pulmonary embolism  CT PE protocol was repeated given  hypoxia and persistent tachycardia despite volume resuscitation   Repeat CT PE protocol showed no new pulmonary emboli  - resumed home eliquis  Severe protein-calorie malnutrition  Nutrition consulted. Most recent weight and BMI monitored-     Measurements:  Wt Readings from Last 1 Encounters:   07/05/25 49 kg (108 lb 0.4 oz)   Body mass index is 21.1 kg/m².    Patient has been screened and assessed by RD.    Malnutrition Type:  Context:    Level:      Malnutrition Characteristic Summary:       Interventions/Recommendations (treatment strategy):  1. Recommend initiation of EN regimen of Isosource 1.5 at 10 mL/hr and advance as tolerated to goal rate of 45 mL/hr to provide 1620 kcal, 73 gm pro, and 825 mL free water. Additional FWF per MD. 2. Addition of Bradley BID to promote wound healing. Mix 1 packet of Bradley in  mL water, dissolve well and administer via PEG. Addition of 30-60 mL FWF before and after administration. 3. Monitor weight/labs. 4. RD to follow  and monitor nutrition status    Open wound of skin  Wound care consult for wound dressing.  Multiple ulcers, stage IV sacral wound  Acute cystitis without hematuria  Patient is nonverbal, could not give history of UTI  Urine culture growing Candida glabrata, unsure if this is colonization  Currently on micafungin- end date 7/9/25  ID recommendations appreciated  VTE Risk Mitigation (From admission, onward)           Ordered     apixaban tablet 5 mg  2 times daily         07/07/25 1124     IP VTE HIGH RISK PATIENT  Once         07/02/25 1344     Place sequential compression device  Until discontinued         07/02/25 1344     Place DEBBY hose  Until discontinued         07/02/25 1344                    Discharge Planning   LORENA: 7/9/2025     Code Status: DNR   Medical Readiness for Discharge Date:   Discharge Plan A: Skilled Nursing Facility   Discharge Delays: None known at this time              Please place Justification for DME      Hailee Taylor MD  Department of Hospital Medicine   [facility]

## 2025-07-08 NOTE — PLAN OF CARE
[facility]         NURSING HOME ORDERS    07/09/2025    Admit to Nursing Home:  Skilled Bed                                               Diagnoses:  Active Hospital Problems    Diagnosis  POA    *Severe sepsis [A41.9, R65.20]  Yes    Acute cystitis without hematuria [N30.00]  Yes    Severe protein-calorie malnutrition [E43]  Yes    Open wound of skin [T14.8XXA]  Yes    History of CVA (cerebrovascular accident) [Z86.73]  Not Applicable    Chronic combined systolic and diastolic CHF (congestive heart failure) [I50.42]  Yes    PEG (percutaneous endoscopic gastrostomy) status [Z93.1]  Not Applicable    Advanced care planning/counseling discussion [Z71.89]  Not Applicable    Seizures [R56.9]  Yes    History of Clostridioides difficile colitis [Z86.19]  Not Applicable    History of pulmonary embolism [Z86.711]  Yes    Hypertension [I10]  Yes    Hemiparesis of right dominant side as late effect of cerebral infarction [I69.351]  Not Applicable    Global aphasia [R47.01]  Yes      Resolved Hospital Problems   No resolved problems to display.       Patient is homebound due to:  Severe sepsis    Allergies:Review of patient's allergies indicates:  No Known Allergies    Vitals: Every shift (Skilled Nursing patients)    Diet:    Tube Feeding:      - Continuous Isosource 1.5 at 45 mL per hour    - Flush tube with 200 mL water every 8 hours    - Bradley twice daily via PEG     LABS:  Per facility protocol    Nursing Precautions:     - Aspiration precautions:             -  Upright 90 degrees befor during and after meals             -  Suction at bedside          - Fall precautions per nursing home protocol   - Seizure precaution per [facility] protocol   - Decubitus precautions:        -  for positioning   - Pressure reducing foam mattress   - Turn patient every two hours. Use wedge pillows to anchor patient    CONSULTS:     Physical Therapy to evaluate and treat     Occupational Therapy to evaluate and  treat     Speech Therapy  to evaluate and treat      MISCELLANEOUS CARE:      PEG Care:  Clean site every 24 hours     Routine Skin for Bedridden Patients:  Apply moisture barrier cream to all skin folds and wet areas in perineal area daily and after baths and all bowel movements.    WOUND CARE  Local wound care to sacrum, right lateral knee, and right lateral ankle every shift- Cleanse with Vashe. Apply 3M Cavilon No Sting Film Barrier to periwound skin. Fill/apply Vashe moistened gauze to wound. Cover with dry gauze and secure with Medipore tape.    Local wound care to perirectal skin every shift and prn- Cleanse with Remedy No Rinse Cleanser. Apply Remedy moisture barrier (blue top tube)    Medications: Discontinue all previous medication orders, if any. See new list below.       Medication List        CHANGE how you take these medications      metoprolol tartrate 100 MG tablet  Commonly known as: LOPRESSOR  1 tablet (100 mg total) by Per G Tube route 2 (two) times daily.  What changed:   medication strength  how much to take  how to take this     vancomycin 125 mg/5 mL Soln  5 mLs (125 mg total) by Per G Tube route every 6 (six) hours for 4 days, THEN 5 mLs (125 mg total) every 12 (twelve) hours for 7 days, THEN 5 mLs (125 mg total) once daily for 7 days, THEN 5 mLs (125 mg total) Every 3 (three) days for 14 days.  Start taking on: July 9, 2025  What changed: See the new instructions.            CONTINUE taking these medications      cyanocobalamin 1000 MCG tablet  Commonly known as: VITAMIN B-12  1,000 mcg by FEEDING TUBE route.     ELIQUIS 5 mg Tab  Generic drug: apixaban  5 mg by Per G Tube route 2 (two) times daily.     empagliflozin 10 mg tablet  Commonly known as: Jardiance  10 mg by Per G Tube route once daily.     FLUoxetine 20 mg/5 mL (4 mg/mL) solution  Commonly known as: PROZAC  40 mg by FEEDING TUBE route.     levETIRAcetam 500 MG Tab  Commonly known as: KEPPRA  500 mg by FEEDING TUBE route 2 (two)  times daily.     sacubitriL-valsartan 24-26 mg per tablet  Commonly known as: ENTRESTO  1 tablet by FEEDING TUBE route 2 (two) times daily.            STOP taking these medications      ipratropium 0.02 % nebulizer solution  Commonly known as: ATROVENT     traMADoL 50 mg tablet  Commonly known as: ULTRAM                      _________________________________  Hailee Taylor MD  07/09/2025

## 2025-07-08 NOTE — PT/OT/SLP EVAL
Physical Therapy Evaluation and Discharge Note    Patient Name:  Lynda Mackenzie   MRN:  [id number]    Recommendations:     Discharge Recommendations: Low Intensity Therapy  Discharge Equipment Recommendations: none   Barriers to discharge: None    Assessment:     Lynda Mackenzie is a 69 y.o. female admitted with a medical diagnosis of Severe sepsis. .  At this time, patient is functioning at their prior level of function and does not require further acute PT services.     Recent Surgery: * No surgery found *      Plan:     During this hospitalization, patient does not require further acute PT services.  Please re-consult if situation changes.      Subjective     Chief Complaint: Pt is non-verbal  Patient/Family Comments/goals: none  Pain/Comfort:  Pain Rating 1: 0/10    Patients cultural, spiritual, Yarsanism conflicts given the current situation: no    Living Environment:  Pt recently attending skilled nursing , and will eventually transition to [facility] care   Prior to admission, patients level of function was total assist s/p CVA.  Equipment used at home: wheelchair.  DME owned (not currently used): all equipment provided by nursing home.  Upon discharge, patient will have assistance from staff.    Objective:     Communicated with nursing prior to session.  Patient found HOB elevated with mandujano catheter, PEG Tube, pressure relief boots, telemetry upon PT entry to room.    General Precautions: Standard, aspiration    Orthopedic Precautions:    Braces:    Respiratory Status: Room air    Exams:  Pt without ability to follow any commands, occasionally made eye contact with left eye and PT located on left side of body  Cognitive Exam:  Patient is oriented to neither Person, Place, time or situation   Gross Motor Coordination:  pt is with no volitional movement other than occasional repositioning of L+ LE while supine  in bed  RLE ROM: Deficits: noted passively with LE nearing diamante into flexed position   RLE  Strength: no volitional movement   LLE ROM: Deficits: noted as pt has limited flexibility of musculature due to immobility   LLE Strength: visually 3/5    Functional Mobility:  Bed Mobility:     Rolling Left:  total assistance  Rolling Right: total assistance  Scooting: total assistance  Supine to Sit: maximal assistance and of 2 persons  Sit to Supine: maximal assistance and of 2 persons  Balance: Static Sit Balance Poor to Fair (-), pt did display ability to hold herself upright sitting for up to 10 sec     AM-PAC 6 CLICK MOBILITY  Total Score:        Treatment and Education:  Eval    AM-PAC 6 CLICK MOBILITY  Total Score:      Patient left HOB elevated with all lines intact, call button in reach, and nursing notified.    GOALS:   Multidisciplinary Problems       Physical Therapy Goals       Not on file              Multidisciplinary Problems (Resolved)          Problem: Physical Therapy    Goal Priority Disciplines Outcome Interventions   Physical Therapy Goal   (Resolved)     PT, PT/OT Met    Description: PT Eval and Discharge                        DME Justifications:  No DME recommended requiring DME justifications    History:     Past Medical History:   Diagnosis Date    Anticoagulant long-term use     CHF (congestive heart failure)     Seizures     Stroke        No past surgical history on file.    Time Tracking:     PT Received On:    PT Start Time: 1158     PT Stop Time: 1209  PT Total Time (min): 11 min     Billable Minutes: Evaluation 1      07/08/2025

## 2025-07-08 NOTE — ASSESSMENT & PLAN NOTE
Patient's blood pressure range in the last 24 hours was: BP  Min: 100/62  Max: 138/76.T  he patient's inpatient anti-hypertensive regimen is listed below:  Current Antihypertensives  , 2 times daily, FEEDING TUBE  metoprolol tartrate (LOPRESSOR) tablet 50 mg, 2 times daily, Per G Tube    Plan  - BP is controlled, no changes needed to their regimen  - home metoprolol/entresto restarted

## 2025-07-08 NOTE — ASSESSMENT & PLAN NOTE
CT PE protocol was repeated given  hypoxia and persistent tachycardia despite volume resuscitation   Repeat CT PE protocol showed no new pulmonary emboli  - resumed home eliquis

## 2025-07-08 NOTE — NURSING
[facility]  Nurses Note -- 4 Eyes      7/8/2025       Skin assessed on: Q Shift      [] No Pressure Injuries Present    []Prevention Measures Documented    [x] Yes LDA  for Pressure Injury Previously documented     [] Yes New Pressure Injury Discovered   [] LDA for New Pressure Injury Added      Attending RN:  Sofie Ovalles LPN     Second RN:  SHERIF Escalera

## 2025-07-08 NOTE — PLAN OF CARE
Problem: Skin Injury Risk Increased  Goal: Skin Health and Integrity  Outcome: Progressing     Problem: Adult Inpatient Plan of Care  Goal: Plan of Care Review  Outcome: Progressing  Goal: Patient-Specific Goal (Individualized)  Outcome: Progressing  Goal: Absence of Hospital-Acquired Illness or Injury  Outcome: Progressing  Goal: Optimal Comfort and Wellbeing  Outcome: Progressing  Goal: Readiness for Transition of Care  Outcome: Progressing     Problem: Coping Ineffective  Goal: Effective Coping  Outcome: Progressing     Problem: Wound  Goal: Optimal Coping  Outcome: Progressing  Goal: Optimal Functional Ability  Outcome: Progressing  Goal: Absence of Infection Signs and Symptoms  Outcome: Progressing  Goal: Improved Oral Intake  Outcome: Progressing  Goal: Optimal Pain Control and Function  Outcome: Progressing  Goal: Skin Health and Integrity  Outcome: Progressing  Goal: Optimal Wound Healing  Outcome: Progressing     Problem: Sepsis/Septic Shock  Goal: Optimal Coping  Outcome: Progressing  Goal: Absence of Bleeding  Outcome: Progressing  Goal: Blood Glucose Level Within Targeted Range  Outcome: Progressing  Goal: Absence of Infection Signs and Symptoms  Outcome: Progressing  Goal: Optimal Nutrition Intake  Outcome: Progressing      Laceration    A laceration is a cut that goes through all of the layers of the skin and into the tissue that is right under the skin. Some lacerations heal on their own. Others need to be closed with skin adhesive strips, skin glue, stitches (sutures), or staples. Proper laceration care minimizes the risk of infection and helps the laceration to heal better.  If non-absorbable stitches or staples have been placed, they must be taken out within the time frame instructed by your healthcare provider.    SEEK IMMEDIATE MEDICAL CARE IF YOU HAVE ANY OF THE FOLLOWING SYMPTOMS: swelling around the wound, worsening pain, drainage from the wound, red streaking going away from your wound, inability to move finger or toe near the laceration, or discoloration of skin near the laceration.

## 2025-07-08 NOTE — NURSING
[facility] MEWS TRIGGER FOLLOW UP       MEWS Monitoring, Score is:4  Indication for review:     Bedside NurseLali contacted, no concerns verbalized at this time, instructed to call [phone] for further concerns or assistance..

## 2025-07-09 VITALS
RESPIRATION RATE: 16 BRPM | BODY MASS INDEX: 21.2 KG/M2 | HEIGHT: 60 IN | WEIGHT: 108 LBS | OXYGEN SATURATION: 97 % | HEART RATE: 92 BPM | DIASTOLIC BLOOD PRESSURE: 60 MMHG | TEMPERATURE: 99 F | SYSTOLIC BLOOD PRESSURE: 137 MMHG

## 2025-07-09 LAB
BACTERIA BLD CULT: NORMAL
HOLD SPECIMEN: NORMAL
POCT GLUCOSE: 164 MG/DL (ref 70–110)

## 2025-07-09 PROCEDURE — 63600175 PHARM REV CODE 636 W HCPCS: Performed by: STUDENT IN AN ORGANIZED HEALTH CARE EDUCATION/TRAINING PROGRAM

## 2025-07-09 PROCEDURE — A4216 STERILE WATER/SALINE, 10 ML: HCPCS | Performed by: STUDENT IN AN ORGANIZED HEALTH CARE EDUCATION/TRAINING PROGRAM

## 2025-07-09 PROCEDURE — 25000003 PHARM REV CODE 250: Performed by: INTERNAL MEDICINE

## 2025-07-09 PROCEDURE — 63600175 PHARM REV CODE 636 W HCPCS: Performed by: HOSPITALIST

## 2025-07-09 PROCEDURE — 25000003 PHARM REV CODE 250: Performed by: HOSPITALIST

## 2025-07-09 PROCEDURE — 63600175 PHARM REV CODE 636 W HCPCS: Performed by: PHYSICIAN ASSISTANT

## 2025-07-09 PROCEDURE — 25000003 PHARM REV CODE 250: Performed by: STUDENT IN AN ORGANIZED HEALTH CARE EDUCATION/TRAINING PROGRAM

## 2025-07-09 PROCEDURE — 94761 N-INVAS EAR/PLS OXIMETRY MLT: CPT

## 2025-07-09 PROCEDURE — 99900035 HC TECH TIME PER 15 MIN (STAT)

## 2025-07-09 RX ORDER — METOPROLOL TARTRATE 100 MG/1
100 TABLET ORAL 2 TIMES DAILY
Start: 2025-07-09

## 2025-07-09 RX ORDER — METOPROLOL TARTRATE 50 MG/1
100 TABLET ORAL 2 TIMES DAILY
Status: DISCONTINUED | OUTPATIENT
Start: 2025-07-09 | End: 2025-07-09 | Stop reason: HOSPADM

## 2025-07-09 RX ADMIN — Medication 125 MG: at 12:07

## 2025-07-09 RX ADMIN — SACUBITRIL AND VALSARTAN 1 TABLET: 24; 26 TABLET, FILM COATED ORAL at 09:07

## 2025-07-09 RX ADMIN — APIXABAN 5 MG: 5 TABLET, FILM COATED ORAL at 09:07

## 2025-07-09 RX ADMIN — LEVETIRACETAM 500 MG: 100 INJECTION INTRAVENOUS at 08:07

## 2025-07-09 RX ADMIN — FAMOTIDINE 20 MG: 40 POWDER, FOR SUSPENSION ORAL at 09:07

## 2025-07-09 RX ADMIN — MICAFUNGIN SODIUM 100 MG: 100 INJECTION, POWDER, LYOPHILIZED, FOR SOLUTION INTRAVENOUS at 06:07

## 2025-07-09 RX ADMIN — Medication 125 MG: at 06:07

## 2025-07-09 RX ADMIN — METOPROLOL TARTRATE 100 MG: 50 TABLET, FILM COATED ORAL at 09:07

## 2025-07-09 RX ADMIN — EMPAGLIFLOZIN 10 MG: 10 TABLET, FILM COATED ORAL at 09:07

## 2025-07-09 RX ADMIN — FLUOXETINE 40 MG: 20 SOLUTION ORAL at 09:07

## 2025-07-09 NOTE — PLAN OF CARE
07/09/25 1156   Medicare Message   Important Message from Medicare regarding Discharge Appeal Rights Given to patient/caregiver;Explained to patient/caregiver;Signed/date by patient/caregiver   Date IMM was signed 07/09/25   Time IMM was signed 1012

## 2025-07-09 NOTE — NURSING
Pt resting in bed quietly. NAD noted. No c/o pain.     Fall and safety precautions maintained. Bed alarm activated and audible.. Bed locked in lowest position, with side rails up x2. Call bell and personal items within reach  [facility] [place]  Nurses Note -- 4 Eyes      7/8/2025       Skin assessed on: Q Shift      [] No Pressure Injuries Present    [x]Prevention Measures Documented    [x] Yes LDA  for Pressure Injury Previously documented     [] Yes New Pressure Injury Discovered   [] LDA for New Pressure Injury Added      Attending RN:  Keyon Maddox, RN     Second RN:  Sofie hemphill LPN

## 2025-07-09 NOTE — DISCHARGE SUMMARY
[facility] Medicine  Discharge Summary      Patient Name: Lynda Mackenzie  MRN: [id number]  POONAM: [id number]  Patient Class: IP- Inpatient  Admission Date: 7/2/2025  Hospital Length of Stay: 7 days  Discharge Date and Time: 07/09/2025 9:52 AM  Attending Physician: Hailee Taylor MD   Discharging Provider: Hailee Taylor MD  Primary Care Provider: Rut Rosenthal MD    Primary Care Team: Networked reference to record PCT     HPI:   Lynda Mackenzie 69 y.o. female with CHF, history of PE on eliquis, seizure disorder on keppra, previous CVA with residual aphasia and right sided hemiparesis, PEG status presents to the hospital from [facility] after witnessed hypoxia and increased work of breathing. The patient is unable to provide any history due to baseline aphasia.     Discussed with nurse from [facility] she reports this morning patient's respiratory rate was elevated near 30 and she was hypoxic requiring supplemental oxygen causing activation of EMS.  At baseline she is not on supplemental oxygen.  She is nonverbal at baseline.  She has been tolerating her tube feeds at 40 cc an hour continuous with 140cc every 4 hours FWF.  She completed her vancomycin from previous hospitalization for C diff colitis.  There has been no diarrhea at the nursing home.  There have been no witnessed episodes of emesis or seizures.  She has not had a fever.  She receives her Keppra and Eliquis via PEG.    Discussed with patient's daughter KIRK he will confirms DNR status.  She has baseline aphasia and right-sided hemiparesis from previous CVA. She is not on home oxygen.     In the ED, tachycardic near 140 white blood cell count 22 initial lactic acid 3.5 repeat 2.4 UA with white blood cells bacteria initial chest x-ray without acute process, repeat after 1.7 L of IVF with increased conspicuous lung markings.        * No surgery found *      Hospital Course:   Ms Lynda Mackenzie is a 69 y.o. woman with  recent CVA with aphasia, PEG in place, R sided weakness who was admitted with sepsis. Diagnosed with Cdiff and Candida UTI. Continues on treatment with PO vanc (end 8/9/25) and IV micafungin (end 7/9/25). Completed micafungin. Stable for discharge back to nursing home with course of vanc per PEG. Continue wound care. Follow with PCP.      Goals of Care Treatment Preferences:  Code Status: DNR         Consults:   Consults (From admission, onward)          Status Ordering Provider     Inpatient consult to Infectious Diseases  Once        Provider:  Carlos Alberto Paula MD    Completed HANNAH BARRAGAN     Case Management/  Once        Provider:  (Not yet assigned)    Completed ELLIOT ADAMS     Inpatient consult to Registered Dietitian/Nutritionist  Once        Provider:  (Not yet assigned)    Completed ELLIOT ADAMS     Inpatient consult to Palliative Care  Once        Provider:  Elin Norris NP    Completed ELLIOT ADAMS            Assessment & Plan  Severe sepsis  Presents with hypoxia, tachycardia, and WBC of 22 from [facility]. Remains tachycardic in the ED despite IVF, and remains on NC. With sacral decubitus without surrounding cellulitis or drainage. Possible source includes urine. Was tachycardic persistently at [facility] during last hospitalization.    - Has completed IV vancomycin and Zosyn for sepsis.    - Continue oral vancomycin in the setting of Clostridium difficile  - Urine culture growing Candida glabrata.  Currently on micafungin- end date 7/9/25 (completed)  - Stage IV sacral wound- Wound care consulted  Global aphasia  Non-verbal at baseline following her CVA.   Currently on tube feedings.   Aspiration precaution.  Continue range of motion physical therapy  - at SNF prior to admit- plan to return back there     Hemiparesis of right dominant side as late effect of cerebral infarction  Per care everywhere.   Aphasic at baseline per NH following a previous  cerebrovascular accident  Continue range of motion physical therapy as tolerated following contact precaution  Confirmed with daughter with residual right sided hemiparesis from previous CVA  - goal to go back to SNF upon discharge   Hypertension  Patient's blood pressure range in the last 24 hours was: BP  Min: 100/62  Max: 135/71.T  he patient's inpatient anti-hypertensive regimen is listed below:  Current Antihypertensives  metoprolol tartrate (LOPRESSOR) tablet 100 mg, 2 times daily, Per G Tube  metoprolol tartrate (LOPRESSOR) tablet, 2 times daily, Per G Tube    Plan  - BP is controlled, no changes needed to their regimen  - home metoprolol/entresto restarted  History of CVA (cerebrovascular accident)  With residual non-verbal status and PEG dependent per discussion with NH    Chronic combined systolic and diastolic CHF (congestive heart failure)  Patient has Combined Systolic and Diastolic heart failure that is Chronic. On presentation their CHF was well compensated.   Echo  7/2/25    Left Ventricle: The left ventricle is normal in size. Normal wall thickness. Regional wall motion abnormalities present. Basal to mid inferolateral, inferior and inferoseptal wall akinesis. There is severely reduced systolic function with a visually estimated ejection fraction of 25 - 30%. Grade I diastolic dysfunction.    Right Ventricle: The right ventricle is normal in size measuring 2.8 cm. Systolic function is normal.    Mitral Valve: There is mild regurgitation.    Tricuspid Valve: There is mild regurgitation.    Pulmonary Artery: The estimated pulmonary artery systolic pressure is 26 mmHg.    IVC/SVC: Normal venous pressure at 3 mmHg.        Current Heart Failure Medications  , Daily, Per G Tube  , 2 times daily, FEEDING TUBE  sacubitriL-valsartan 24-26 mg per tablet 1 tablet, 2 times daily, Per G Tube  empagliflozin (Jardiance) tablet 10 mg, Daily, Per G Tube    Plan  - Monitor strict I&Os and daily weights.    - Place on  telemetry  - Low sodium diet  - Place on fluid restriction of 1.5 L.   - Cardiology has not been consulted  - The patient's volume status is at their baseline  - continue metoprolol, entresto, jardiance for GDMT.         PEG (percutaneous endoscopic gastrostomy) status  Patient noted to have a percutaneous endoscopic gastrostomy tube in place. I have personally inspected the tube.Tube was placed prior to this admission   There are no signs of drainage or infection around the site.   The tube is patent. Medications have converted to liquid form if available.    Routine care to be done by wound care and nursing staff.     On Jevity 1.5 continuous per [facility] at 40 cc/hr with 120 FWF every 4. Nutrition consulted    Advanced care planning/counseling discussion  Advance Care Planning     Date: 07/02/2025    Code Status  In light of the patients advanced and life limiting illness,I engaged the the family and healthcare power of   in a voluntary conversation about the patient's preferences for care  at the very end of life. The patient wishes to have a natural, peaceful death.  Along those lines, the patient does not wish to have CPR or other invasive treatments performed when her heart and/or breathing stops. I communicated to the family and healthcare power of   that a DNR order would be placed in her medical record to reflect this preference.    A total of 16 min was spent on advance care planning, goals of care discussion, emotional support, formulating and communicating prognosis and exploring burden/benefit of various approaches of treatment. This discussion occurred on a fully voluntary basis with the verbal consent of the patient and/or family.     Discussed with KIRK who is also director of nursing at NH where patient resides. Confirmed DNR status. Palliative care consulted       Seizures  Convert home keppra to IV while hospitalized  Cefepime has been discontinued.  Avoid tramadol and other  seizure lowering meds  History of Clostridioides difficile colitis  Initially diagnosed 6/2025.   Confirmed with NH completed,   Started on extended vancomycin due to persistent positive test.  History of pulmonary embolism  CT PE protocol was repeated given  hypoxia and persistent tachycardia despite volume resuscitation   Repeat CT PE protocol showed no new pulmonary emboli  - resumed home eliquis  Severe protein-calorie malnutrition  Nutrition consulted. Most recent weight and BMI monitored-     Measurements:  Wt Readings from Last 1 Encounters:   07/05/25 49 kg (108 lb 0.4 oz)   Body mass index is 21.1 kg/m².    Patient has been screened and assessed by RD.    Malnutrition Type:  Context:    Level:      Malnutrition Characteristic Summary:       Interventions/Recommendations (treatment strategy):  1. Recommend initiation of EN regimen of Isosource 1.5 at 10 mL/hr and advance as tolerated to goal rate of 45 mL/hr to provide 1620 kcal, 73 gm pro, and 825 mL free water. Additional FWF per MD. 2. Addition of Bradley BID to promote wound healing. Mix 1 packet of Bradley in  mL water, dissolve well and administer via PEG. Addition of 30-60 mL FWF before and after administration. 3. Monitor weight/labs. 4. RD to follow and monitor nutrition status    Open wound of skin  Wound care consult for wound dressing.  Multiple ulcers, stage IV sacral wound  Acute cystitis without hematuria  Patient is nonverbal, could not give history of UTI  Urine culture growing Candida glabrata, unsure if this is colonization  Currently on micafungin- end date 7/9/25- completed  ID recommendations appreciated  Final Active Diagnoses:    Diagnosis Date Noted POA    PRINCIPAL PROBLEM:  Severe sepsis [A41.9, R65.20] 07/02/2025 Yes    Acute cystitis without hematuria [N30.00] 07/04/2025 Yes    Severe protein-calorie malnutrition [E43] 07/03/2025 Yes    Open wound of skin [T14.8XXA] 07/03/2025 Yes    History of CVA (cerebrovascular accident)  [Z86.73] 07/02/2025 Not Applicable    Chronic combined systolic and diastolic CHF (congestive heart failure) [I50.42] 07/02/2025 Yes    PEG (percutaneous endoscopic gastrostomy) status [Z93.1] 07/02/2025 Not Applicable    Advanced care planning/counseling discussion [Z71.89] 07/02/2025 Not Applicable    Seizures [R56.9] 07/02/2025 Yes    History of Clostridioides difficile colitis [Z86.19] 07/02/2025 Not Applicable    History of pulmonary embolism [Z86.711] 07/02/2025 Yes    Hypertension [I10] 04/29/2025 Yes    Hemiparesis of right dominant side as late effect of cerebral infarction [I69.351] 04/29/2025 Not Applicable    Global aphasia [R47.01] 04/29/2025 Yes      Problems Resolved During this Admission:       Discharged Condition: good    Disposition: Skilled Nursing Facility    Follow Up: with PCP    Patient Instructions:      Notify your health care provider if you experience any of the following:  temperature >100.4     Notify your health care provider if you experience any of the following:  persistent nausea and vomiting or diarrhea     Notify your health care provider if you experience any of the following:  severe uncontrolled pain     Notify your health care provider if you experience any of the following:  redness, tenderness, or signs of infection (pain, swelling, redness, odor or green/yellow discharge around incision site)     Notify your health care provider if you experience any of the following:  difficulty breathing or increased cough     Notify your health care provider if you experience any of the following:  severe persistent headache     Notify your health care provider if you experience any of the following:  worsening rash     Notify your health care provider if you experience any of the following:  persistent dizziness, light-headedness, or visual disturbances     Notify your health care provider if you experience any of the following:  increased confusion or weakness     Tube  Feedings/Formulas     Order Specific Question Answer Comments   Select Adult Formula: Isosource 1.5 vicki    Route: Gastrostomy    Formula Rate (mL/hr): 45      Activity as tolerated       Significant Diagnostic Studies: N/A    Pending Diagnostic Studies:       Procedure Component Value Units Date/Time    [place] URINE HOLD [[id number]] Collected: 07/08/25 1305    Order Status: Sent Lab Status: In process Updated: 07/08/25 1315    Specimen: Urine            Medications:  Reconciled Home Medications:      Medication List        CHANGE how you take these medications      metoprolol tartrate 100 MG tablet  Commonly known as: LOPRESSOR  1 tablet (100 mg total) by Per G Tube route 2 (two) times daily.  What changed:   medication strength  how much to take  how to take this     vancomycin 125 mg/5 mL Soln  5 mLs (125 mg total) by Per G Tube route every 6 (six) hours for 4 days, THEN 5 mLs (125 mg total) every 12 (twelve) hours for 7 days, THEN 5 mLs (125 mg total) once daily for 7 days, THEN 5 mLs (125 mg total) Every 3 (three) days for 14 days.  Start taking on: July 9, 2025  What changed: See the new instructions.            CONTINUE taking these medications      cyanocobalamin 1000 MCG tablet  Commonly known as: VITAMIN B-12  1,000 mcg by FEEDING TUBE route.     ELIQUIS 5 mg Tab  Generic drug: apixaban  5 mg by Per G Tube route 2 (two) times daily.     empagliflozin 10 mg tablet  Commonly known as: Jardiance  10 mg by Per G Tube route once daily.     FLUoxetine 20 mg/5 mL (4 mg/mL) solution  Commonly known as: PROZAC  40 mg by FEEDING TUBE route.     levETIRAcetam 500 MG Tab  Commonly known as: KEPPRA  500 mg by FEEDING TUBE route 2 (two) times daily.     sacubitriL-valsartan 24-26 mg per tablet  Commonly known as: ENTRESTO  1 tablet by FEEDING TUBE route 2 (two) times daily.            STOP taking these medications      ipratropium 0.02 % nebulizer solution  Commonly known as: ATROVENT     traMADoL 50 mg  tablet  Commonly known as: ULTRAM              Indwelling Lines/Drains at time of discharge:   Lines/Drains/Airways       Drain  Duration                  Gastrostomy/Enterostomy 07/02/25 1300 LUQ 6 days                        Time spent on the discharge of patient: 35 minutes         Hailee Taylor MD  Department of Hospital Medicine  [facility]

## 2025-07-09 NOTE — NURSING
Report called to Keyona Naranjo.Updated information and vitals given. Transport arranged for 1330. Awaiting pt's pickup.

## 2025-07-09 NOTE — NURSING
Report received from night nurse SHERIF Ta. Visualized patient and assessed patient's overall condition and appearance. No acute distress noted. Plan of care ongoing.    [facility]  Nurses Note -- 4 Eyes      7/9/2025       Skin assessed on: Q Shift      [] No Pressure Injuries Present    [x]Prevention Measures Documented    [x] Yes LDA  for Pressure Injury Previously documented     [] Yes New Pressure Injury Discovered   [] LDA for New Pressure Injury Added      Attending RN:  Meagan Gonzalez LPN     Second RN:  SHERIF Ta       diabetes/heart disease/hypertension

## 2025-07-09 NOTE — ASSESSMENT & PLAN NOTE
Patient is nonverbal, could not give history of UTI  Urine culture growing Candida glabrata, unsure if this is colonization  Currently on micafungin- end date 7/9/25- completed  ID recommendations appreciated

## 2025-07-09 NOTE — PLAN OF CARE
Problem: Skin Injury Risk Increased  Goal: Skin Health and Integrity  Outcome: Progressing     Problem: Adult Inpatient Plan of Care  Goal: Plan of Care Review  Outcome: Progressing  Goal: Patient-Specific Goal (Individualized)  Outcome: Progressing  Goal: Optimal Comfort and Wellbeing  Outcome: Progressing     Problem: Wound  Goal: Optimal Coping  Outcome: Progressing     Problem: Fall Injury Risk  Goal: Absence of Fall and Fall-Related Injury  Outcome: Progressing

## 2025-07-09 NOTE — PLAN OF CARE
07/09/25 0931   Rounds   Attendance Provider;Nurse ;Charge nurse   Discharge Plan A Skilled Nursing Facility   Why the patient remains in the hospital Requires continued medical care   Transition of Care Barriers None

## 2025-07-09 NOTE — NURSING
In preparation for discharge, d/c'd pt's saline lock, applied pressure to site, secured gauze and coban, no redness or swelling noted. Instructions given. Pt was transported off the unit to ambulance for discharge.

## 2025-07-09 NOTE — PLAN OF CARE
Problem: Skin Injury Risk Increased  Goal: Skin Health and Integrity  Outcome: Met     Problem: Adult Inpatient Plan of Care  Goal: Plan of Care Review  Outcome: Met  Goal: Patient-Specific Goal (Individualized)  Outcome: Met  Goal: Absence of Hospital-Acquired Illness or Injury  Outcome: Met  Goal: Optimal Comfort and Wellbeing  Outcome: Met  Goal: Readiness for Transition of Care  Outcome: Met     Problem: Coping Ineffective  Goal: Effective Coping  Outcome: Met     Problem: Wound  Goal: Optimal Coping  Outcome: Met  Goal: Optimal Functional Ability  Outcome: Met  Goal: Absence of Infection Signs and Symptoms  Outcome: Met  Goal: Improved Oral Intake  Outcome: Met  Goal: Optimal Pain Control and Function  Outcome: Met  Goal: Skin Health and Integrity  Outcome: Met  Goal: Optimal Wound Healing  Outcome: Met     Problem: Sepsis/Septic Shock  Goal: Optimal Coping  Outcome: Met  Goal: Absence of Bleeding  Outcome: Met  Goal: Blood Glucose Level Within Targeted Range  Outcome: Met  Goal: Absence of Infection Signs and Symptoms  Outcome: Met  Goal: Optimal Nutrition Intake  Outcome: Met     Problem: Infection  Goal: Absence of Infection Signs and Symptoms  Outcome: Met     Problem: Fall Injury Risk  Goal: Absence of Fall and Fall-Related Injury  Outcome: Met

## 2025-07-09 NOTE — ASSESSMENT & PLAN NOTE
Presents with hypoxia, tachycardia, and WBC of 22 from [facility]. Remains tachycardic in the ED despite IVF, and remains on NC. With sacral decubitus without surrounding cellulitis or drainage. Possible source includes urine. Was tachycardic persistently at  during last hospitalization.    - Has completed IV vancomycin and Zosyn for sepsis.    - Continue oral vancomycin in the setting of Clostridium difficile  - Urine culture growing Candida glabrata.  Currently on micafungin- end date 7/9/25 (completed)  - Stage IV sacral wound- Wound care consulted

## 2025-07-09 NOTE — ASSESSMENT & PLAN NOTE
Patient's blood pressure range in the last 24 hours was: BP  Min: 100/62  Max: 135/71.T  he patient's inpatient anti-hypertensive regimen is listed below:  Current Antihypertensives  metoprolol tartrate (LOPRESSOR) tablet 100 mg, 2 times daily, Per G Tube  metoprolol tartrate (LOPRESSOR) tablet, 2 times daily, Per G Tube    Plan  - BP is controlled, no changes needed to their regimen  - home metoprolol/entresto restarted

## 2025-07-09 NOTE — PLAN OF CARE
Case Management Final Discharge Note    Call report to [phone], ask for nurse zahra Rand going to station 1 room 104.  Notified ROBERT Rhodes of call report    Notified pt's daughter Ida Mancilla of dc today and transport ETA of 1:35-3:35pm.    Discharge Disposition: [facility] to [facility]    New DME ordered / company name: None    Relevant SDOH / Transition of Care Barriers:  None identified    Person available to provide assistance at home when needed and their contact information: Ida Mancilla [phone]    Scheduled followup appointment: Pt will follow up with provider at the facility    Referrals placed: None    Transportation: ADT 30 order placed for stretcher Transportation.  Requested  time: 1:30pm  If transportation does not arrive at ETA time nurse will be instructed to follow protocol for transportation below:  How can I get in touch directly with dispatch, if needed?                 Non-emergent dispatch: [phone] opt 6    +++NURSING:  If Van does not arrive at requested time please call the above Non Emergent Dispatcher.  If issue not resolved please escalate to your charge nurse for further instructions.    Transport to [address] [place] [address]      Patient and family educated on discharge services and updated on DC plan. Bedside RN notified, patient clear to discharge from Case Management Perspective.      07/09/25 1216   Final Note   Assessment Type Final Discharge Note   Anticipated Discharge Disposition [facility]   Hospital Resources/Appts/Education Provided Provided patient/caregiver with written discharge plan information   Post-Acute Status   Post-Acute Authorization Placement   Post-Acute Placement Status Set-up Complete/Auth obtained   Discharge Delays None known at this time

## 2025-07-13 LAB
BACTERIA BLD CULT: NORMAL
BACTERIA BLD CULT: NORMAL